# Patient Record
Sex: MALE | Race: BLACK OR AFRICAN AMERICAN | NOT HISPANIC OR LATINO | Employment: UNEMPLOYED | URBAN - METROPOLITAN AREA
[De-identification: names, ages, dates, MRNs, and addresses within clinical notes are randomized per-mention and may not be internally consistent; named-entity substitution may affect disease eponyms.]

---

## 2020-03-02 ENCOUNTER — HOSPITAL ENCOUNTER (OUTPATIENT)
Dept: URGENT CARE | Facility: CLINIC | Age: 50
Discharge: HOME | End: 2020-03-02
Attending: EMERGENCY MEDICINE

## 2020-03-19 ENCOUNTER — OFFICE VISIT CONVERTED (OUTPATIENT)
Dept: INTERNAL MEDICINE | Facility: CLINIC | Age: 50
End: 2020-03-19
Attending: PHYSICIAN ASSISTANT

## 2020-03-19 ENCOUNTER — HOSPITAL ENCOUNTER (OUTPATIENT)
Dept: OTHER | Facility: HOSPITAL | Age: 50
Discharge: HOME | End: 2020-03-19
Attending: PHYSICIAN ASSISTANT

## 2020-03-20 LAB — SARS-COV-2 RNA SPEC QL NAA+PROBE: NORMAL

## 2020-06-26 ENCOUNTER — HOSPITAL ENCOUNTER (INPATIENT)
Facility: HOSPITAL | Age: 50
LOS: 9 days | Discharge: HOME | End: 2020-07-05
Attending: EMERGENCY MEDICINE | Admitting: INTERNAL MEDICINE

## 2020-06-26 ENCOUNTER — APPOINTMENT (OUTPATIENT)
Dept: CT IMAGING | Facility: HOSPITAL | Age: 50
End: 2020-06-26

## 2020-06-26 ENCOUNTER — APPOINTMENT (OUTPATIENT)
Dept: GENERAL RADIOLOGY | Facility: HOSPITAL | Age: 50
End: 2020-06-26

## 2020-06-26 DIAGNOSIS — R91.8 LUNG NODULES: ICD-10-CM

## 2020-06-26 DIAGNOSIS — R06.00 DYSPNEA, UNSPECIFIED TYPE: Primary | ICD-10-CM

## 2020-06-26 DIAGNOSIS — E87.1 HYPONATREMIA: ICD-10-CM

## 2020-06-26 DIAGNOSIS — N17.9 AKI (ACUTE KIDNEY INJURY) (HCC): ICD-10-CM

## 2020-06-26 DIAGNOSIS — R09.02 HYPOXIA: ICD-10-CM

## 2020-06-26 DIAGNOSIS — Z20.822 SUSPECTED COVID-19 VIRUS INFECTION: ICD-10-CM

## 2020-06-26 DIAGNOSIS — R93.89 ABNORMAL CHEST CT: ICD-10-CM

## 2020-06-26 DIAGNOSIS — M48.062 SPINAL STENOSIS OF LUMBAR REGION WITH NEUROGENIC CLAUDICATION: ICD-10-CM

## 2020-06-26 DIAGNOSIS — E83.52 HYPERCALCEMIA: ICD-10-CM

## 2020-06-26 LAB
ALBUMIN SERPL-MCNC: 3.6 G/DL (ref 3.5–5.2)
ALBUMIN/GLOB SERPL: 0.8 G/DL
ALP SERPL-CCNC: 83 U/L (ref 39–117)
ALT SERPL W P-5'-P-CCNC: 12 U/L (ref 1–41)
ANION GAP SERPL CALCULATED.3IONS-SCNC: 12.9 MMOL/L (ref 5–15)
AST SERPL-CCNC: 21 U/L (ref 1–40)
BACTERIA UR QL AUTO: ABNORMAL /HPF
BASOPHILS # BLD AUTO: 0.02 10*3/MM3 (ref 0–0.2)
BASOPHILS NFR BLD AUTO: 0.2 % (ref 0–1.5)
BILIRUB SERPL-MCNC: 0.5 MG/DL (ref 0.2–1.2)
BILIRUB UR QL STRIP: NEGATIVE
BUN BLD-MCNC: 21 MG/DL (ref 6–20)
BUN/CREAT SERPL: 11.3 (ref 7–25)
CALCIUM SPEC-SCNC: 14 MG/DL (ref 8.6–10.5)
CHLORIDE SERPL-SCNC: 92 MMOL/L (ref 98–107)
CLARITY UR: CLEAR
CO2 SERPL-SCNC: 23.1 MMOL/L (ref 22–29)
COLOR UR: YELLOW
CREAT BLD-MCNC: 1.86 MG/DL (ref 0.76–1.27)
D-LACTATE SERPL-SCNC: 2.4 MMOL/L (ref 0.5–2)
DEPRECATED RDW RBC AUTO: 47.5 FL (ref 37–54)
EOSINOPHIL # BLD AUTO: 0.01 10*3/MM3 (ref 0–0.4)
EOSINOPHIL NFR BLD AUTO: 0.1 % (ref 0.3–6.2)
ERYTHROCYTE [DISTWIDTH] IN BLOOD BY AUTOMATED COUNT: 15.2 % (ref 12.3–15.4)
GFR SERPL CREATININE-BSD FRML MDRD: 39 ML/MIN/1.73
GFR SERPL CREATININE-BSD FRML MDRD: 47 ML/MIN/1.73
GLOBULIN UR ELPH-MCNC: 4.6 GM/DL
GLUCOSE BLD-MCNC: 78 MG/DL (ref 65–99)
GLUCOSE UR STRIP-MCNC: NEGATIVE MG/DL
HCT VFR BLD AUTO: 47.9 % (ref 37.5–51)
HGB BLD-MCNC: 16.4 G/DL (ref 13–17.7)
HGB UR QL STRIP.AUTO: ABNORMAL
HYALINE CASTS UR QL AUTO: ABNORMAL /LPF
IMM GRANULOCYTES # BLD AUTO: 0.05 10*3/MM3 (ref 0–0.05)
IMM GRANULOCYTES NFR BLD AUTO: 0.5 % (ref 0–0.5)
INR PPP: 1.28 (ref 0.9–1.1)
KETONES UR QL STRIP: ABNORMAL
LACTATE HOLD SPECIMEN: NORMAL
LEUKOCYTE ESTERASE UR QL STRIP.AUTO: NEGATIVE
LYMPHOCYTES # BLD AUTO: 1.07 10*3/MM3 (ref 0.7–3.1)
LYMPHOCYTES NFR BLD AUTO: 11.3 % (ref 19.6–45.3)
MAGNESIUM SERPL-MCNC: 1.6 MG/DL (ref 1.6–2.6)
MCH RBC QN AUTO: 29.3 PG (ref 26.6–33)
MCHC RBC AUTO-ENTMCNC: 34.2 G/DL (ref 31.5–35.7)
MCV RBC AUTO: 85.5 FL (ref 79–97)
MONOCYTES # BLD AUTO: 0.93 10*3/MM3 (ref 0.1–0.9)
MONOCYTES NFR BLD AUTO: 9.8 % (ref 5–12)
NEUTROPHILS # BLD AUTO: 7.43 10*3/MM3 (ref 1.7–7)
NEUTROPHILS NFR BLD AUTO: 78.1 % (ref 42.7–76)
NITRITE UR QL STRIP: NEGATIVE
NRBC BLD AUTO-RTO: 0 /100 WBC (ref 0–0.2)
NT-PROBNP SERPL-MCNC: 81.2 PG/ML (ref 5–450)
PH UR STRIP.AUTO: <=5 [PH] (ref 5–8)
PLATELET # BLD AUTO: 346 10*3/MM3 (ref 140–450)
PMV BLD AUTO: 9.8 FL (ref 6–12)
POTASSIUM BLD-SCNC: 3.6 MMOL/L (ref 3.5–5.2)
PROCALCITONIN SERPL-MCNC: 0.17 NG/ML (ref 0.1–0.25)
PROT SERPL-MCNC: 8.2 G/DL (ref 6–8.5)
PROT UR QL STRIP: ABNORMAL
PROTHROMBIN TIME: 15.7 SECONDS (ref 11.7–14.2)
RBC # BLD AUTO: 5.6 10*6/MM3 (ref 4.14–5.8)
RBC # UR: ABNORMAL /HPF
REF LAB TEST METHOD: ABNORMAL
SARS-COV-2 N GENE NPH QL NAA+PROBE: NOT DETECTED
SODIUM BLD-SCNC: 128 MMOL/L (ref 136–145)
SP GR UR STRIP: >=1.03 (ref 1–1.03)
SQUAMOUS #/AREA URNS HPF: ABNORMAL /HPF
TROPONIN T SERPL-MCNC: <0.01 NG/ML (ref 0–0.03)
UROBILINOGEN UR QL STRIP: ABNORMAL
WBC CASTS #/AREA URNS LPF: ABNORMAL /LPF
WBC NRBC COR # BLD: 9.51 10*3/MM3 (ref 3.4–10.8)
WBC UR QL AUTO: ABNORMAL /HPF

## 2020-06-26 PROCEDURE — 71275 CT ANGIOGRAPHY CHEST: CPT

## 2020-06-26 PROCEDURE — 83735 ASSAY OF MAGNESIUM: CPT | Performed by: NURSE PRACTITIONER

## 2020-06-26 PROCEDURE — 93005 ELECTROCARDIOGRAM TRACING: CPT | Performed by: NURSE PRACTITIONER

## 2020-06-26 PROCEDURE — 80053 COMPREHEN METABOLIC PANEL: CPT | Performed by: NURSE PRACTITIONER

## 2020-06-26 PROCEDURE — 0 IOPAMIDOL PER 1 ML: Performed by: EMERGENCY MEDICINE

## 2020-06-26 PROCEDURE — 87635 SARS-COV-2 COVID-19 AMP PRB: CPT | Performed by: NURSE PRACTITIONER

## 2020-06-26 PROCEDURE — 83605 ASSAY OF LACTIC ACID: CPT | Performed by: NURSE PRACTITIONER

## 2020-06-26 PROCEDURE — 87040 BLOOD CULTURE FOR BACTERIA: CPT | Performed by: NURSE PRACTITIONER

## 2020-06-26 PROCEDURE — 84145 PROCALCITONIN (PCT): CPT | Performed by: NURSE PRACTITIONER

## 2020-06-26 PROCEDURE — 99284 EMERGENCY DEPT VISIT MOD MDM: CPT

## 2020-06-26 PROCEDURE — 85610 PROTHROMBIN TIME: CPT | Performed by: NURSE PRACTITIONER

## 2020-06-26 PROCEDURE — 85025 COMPLETE CBC W/AUTO DIFF WBC: CPT | Performed by: NURSE PRACTITIONER

## 2020-06-26 PROCEDURE — 71045 X-RAY EXAM CHEST 1 VIEW: CPT

## 2020-06-26 PROCEDURE — 84484 ASSAY OF TROPONIN QUANT: CPT | Performed by: NURSE PRACTITIONER

## 2020-06-26 PROCEDURE — 83880 ASSAY OF NATRIURETIC PEPTIDE: CPT | Performed by: NURSE PRACTITIONER

## 2020-06-26 PROCEDURE — 25010000002 MORPHINE PER 10 MG: Performed by: NURSE PRACTITIONER

## 2020-06-26 PROCEDURE — 25010000002 METHYLPREDNISOLONE PER 125 MG: Performed by: INTERNAL MEDICINE

## 2020-06-26 PROCEDURE — 81001 URINALYSIS AUTO W/SCOPE: CPT | Performed by: NURSE PRACTITIONER

## 2020-06-26 PROCEDURE — 36415 COLL VENOUS BLD VENIPUNCTURE: CPT | Performed by: NURSE PRACTITIONER

## 2020-06-26 PROCEDURE — 25010000002 ONDANSETRON PER 1 MG: Performed by: NURSE PRACTITIONER

## 2020-06-26 PROCEDURE — 93010 ELECTROCARDIOGRAM REPORT: CPT | Performed by: INTERNAL MEDICINE

## 2020-06-26 PROCEDURE — 80048 BASIC METABOLIC PNL TOTAL CA: CPT | Performed by: INTERNAL MEDICINE

## 2020-06-26 RX ORDER — MORPHINE SULFATE 2 MG/ML
4 INJECTION, SOLUTION INTRAMUSCULAR; INTRAVENOUS ONCE
Status: COMPLETED | OUTPATIENT
Start: 2020-06-26 | End: 2020-06-26

## 2020-06-26 RX ORDER — SODIUM CHLORIDE 0.9 % (FLUSH) 0.9 %
10 SYRINGE (ML) INJECTION AS NEEDED
Status: DISCONTINUED | OUTPATIENT
Start: 2020-06-26 | End: 2020-07-05 | Stop reason: HOSPADM

## 2020-06-26 RX ORDER — ACETAMINOPHEN 325 MG/1
650 TABLET ORAL EVERY 4 HOURS PRN
Status: DISCONTINUED | OUTPATIENT
Start: 2020-06-26 | End: 2020-07-05 | Stop reason: HOSPADM

## 2020-06-26 RX ORDER — SODIUM CHLORIDE 9 MG/ML
75 INJECTION, SOLUTION INTRAVENOUS CONTINUOUS
Status: DISCONTINUED | OUTPATIENT
Start: 2020-06-26 | End: 2020-07-01

## 2020-06-26 RX ORDER — ONDANSETRON 4 MG/1
4 TABLET, FILM COATED ORAL EVERY 6 HOURS PRN
Status: DISCONTINUED | OUTPATIENT
Start: 2020-06-26 | End: 2020-07-05 | Stop reason: HOSPADM

## 2020-06-26 RX ORDER — HYDROCODONE BITARTRATE AND ACETAMINOPHEN 5; 325 MG/1; MG/1
1 TABLET ORAL EVERY 4 HOURS PRN
Status: DISCONTINUED | OUTPATIENT
Start: 2020-06-26 | End: 2020-07-02

## 2020-06-26 RX ORDER — BISACODYL 10 MG
10 SUPPOSITORY, RECTAL RECTAL DAILY PRN
Status: DISCONTINUED | OUTPATIENT
Start: 2020-06-26 | End: 2020-07-05 | Stop reason: HOSPADM

## 2020-06-26 RX ORDER — SODIUM CHLORIDE 0.9 % (FLUSH) 0.9 %
10 SYRINGE (ML) INJECTION EVERY 12 HOURS SCHEDULED
Status: DISCONTINUED | OUTPATIENT
Start: 2020-06-26 | End: 2020-07-05 | Stop reason: HOSPADM

## 2020-06-26 RX ORDER — NITROGLYCERIN 0.4 MG/1
0.4 TABLET SUBLINGUAL
Status: DISCONTINUED | OUTPATIENT
Start: 2020-06-26 | End: 2020-07-05 | Stop reason: HOSPADM

## 2020-06-26 RX ORDER — ONDANSETRON 2 MG/ML
4 INJECTION INTRAMUSCULAR; INTRAVENOUS EVERY 6 HOURS PRN
Status: DISCONTINUED | OUTPATIENT
Start: 2020-06-26 | End: 2020-07-05 | Stop reason: HOSPADM

## 2020-06-26 RX ORDER — BISACODYL 5 MG/1
5 TABLET, DELAYED RELEASE ORAL DAILY PRN
Status: DISCONTINUED | OUTPATIENT
Start: 2020-06-26 | End: 2020-07-05 | Stop reason: HOSPADM

## 2020-06-26 RX ORDER — IPRATROPIUM BROMIDE AND ALBUTEROL SULFATE 2.5; .5 MG/3ML; MG/3ML
3 SOLUTION RESPIRATORY (INHALATION)
Status: DISCONTINUED | OUTPATIENT
Start: 2020-06-26 | End: 2020-07-01

## 2020-06-26 RX ORDER — ONDANSETRON 2 MG/ML
4 INJECTION INTRAMUSCULAR; INTRAVENOUS ONCE
Status: COMPLETED | OUTPATIENT
Start: 2020-06-26 | End: 2020-06-26

## 2020-06-26 RX ORDER — METHYLPREDNISOLONE SODIUM SUCCINATE 125 MG/2ML
60 INJECTION, POWDER, LYOPHILIZED, FOR SOLUTION INTRAMUSCULAR; INTRAVENOUS EVERY 6 HOURS
Status: DISCONTINUED | OUTPATIENT
Start: 2020-06-26 | End: 2020-07-01

## 2020-06-26 RX ORDER — ZOLPIDEM TARTRATE 5 MG/1
5 TABLET ORAL NIGHTLY PRN
Status: DISCONTINUED | OUTPATIENT
Start: 2020-06-26 | End: 2020-07-03

## 2020-06-26 RX ORDER — HYDROCODONE BITARTRATE AND ACETAMINOPHEN 7.5; 325 MG/1; MG/1
2 TABLET ORAL EVERY 4 HOURS PRN
Status: DISCONTINUED | OUTPATIENT
Start: 2020-06-26 | End: 2020-07-02

## 2020-06-26 RX ADMIN — IOPAMIDOL 95 ML: 755 INJECTION, SOLUTION INTRAVENOUS at 18:33

## 2020-06-26 RX ADMIN — METHYLPREDNISOLONE SODIUM SUCCINATE 60 MG: 125 INJECTION, POWDER, FOR SOLUTION INTRAMUSCULAR; INTRAVENOUS at 21:27

## 2020-06-26 RX ADMIN — HYDROCODONE BITARTRATE AND ACETAMINOPHEN 2 TABLET: 7.5; 325 TABLET ORAL at 23:54

## 2020-06-26 RX ADMIN — SODIUM CHLORIDE, PRESERVATIVE FREE 10 ML: 5 INJECTION INTRAVENOUS at 23:55

## 2020-06-26 RX ADMIN — ONDANSETRON 4 MG: 2 INJECTION INTRAMUSCULAR; INTRAVENOUS at 19:17

## 2020-06-26 RX ADMIN — MORPHINE SULFATE 4 MG: 2 INJECTION, SOLUTION INTRAMUSCULAR; INTRAVENOUS at 19:17

## 2020-06-26 RX ADMIN — SODIUM CHLORIDE 125 ML/HR: 9 INJECTION, SOLUTION INTRAVENOUS at 21:27

## 2020-06-26 RX ADMIN — SODIUM CHLORIDE 1000 ML: 9 INJECTION, SOLUTION INTRAVENOUS at 18:20

## 2020-06-27 LAB
25(OH)D3 SERPL-MCNC: 29.8 NG/ML (ref 30–100)
ANION GAP SERPL CALCULATED.3IONS-SCNC: 8.3 MMOL/L (ref 5–15)
ANION GAP SERPL CALCULATED.3IONS-SCNC: 8.4 MMOL/L (ref 5–15)
BUN BLD-MCNC: 20 MG/DL (ref 6–20)
BUN BLD-MCNC: 20 MG/DL (ref 6–20)
BUN/CREAT SERPL: 12.1 (ref 7–25)
BUN/CREAT SERPL: 12.7 (ref 7–25)
CA-I BLD-MCNC: 7.7 MG/DL (ref 4.6–5.4)
CA-I SERPL ISE-MCNC: 1.93 MMOL/L (ref 1.15–1.35)
CALCIUM SPEC-SCNC: 12.5 MG/DL (ref 8.6–10.5)
CALCIUM SPEC-SCNC: 12.8 MG/DL (ref 8.6–10.5)
CHLORIDE SERPL-SCNC: 97 MMOL/L (ref 98–107)
CHLORIDE SERPL-SCNC: 97 MMOL/L (ref 98–107)
CHLORIDE UR-SCNC: 118 MMOL/L
CO2 SERPL-SCNC: 22.7 MMOL/L (ref 22–29)
CO2 SERPL-SCNC: 23.6 MMOL/L (ref 22–29)
CREAT BLD-MCNC: 1.57 MG/DL (ref 0.76–1.27)
CREAT BLD-MCNC: 1.65 MG/DL (ref 0.76–1.27)
D-LACTATE SERPL-SCNC: 1.5 MMOL/L (ref 0.5–2)
GFR SERPL CREATININE-BSD FRML MDRD: 54 ML/MIN/1.73
GFR SERPL CREATININE-BSD FRML MDRD: 57 ML/MIN/1.73
GLUCOSE BLD-MCNC: 138 MG/DL (ref 65–99)
GLUCOSE BLD-MCNC: 99 MG/DL (ref 65–99)
OSMOLALITY UR: 328 MOSM/KG
POTASSIUM BLD-SCNC: 3.8 MMOL/L (ref 3.5–5.2)
POTASSIUM BLD-SCNC: 4.2 MMOL/L (ref 3.5–5.2)
PTH-INTACT SERPL-MCNC: 11.8 PG/ML (ref 15–65)
SODIUM BLD-SCNC: 128 MMOL/L (ref 136–145)
SODIUM BLD-SCNC: 129 MMOL/L (ref 136–145)
SODIUM UR-SCNC: 111 MMOL/L

## 2020-06-27 PROCEDURE — 80048 BASIC METABOLIC PNL TOTAL CA: CPT | Performed by: INTERNAL MEDICINE

## 2020-06-27 PROCEDURE — 83935 ASSAY OF URINE OSMOLALITY: CPT | Performed by: INTERNAL MEDICINE

## 2020-06-27 PROCEDURE — 82397 CHEMILUMINESCENT ASSAY: CPT | Performed by: INTERNAL MEDICINE

## 2020-06-27 PROCEDURE — 82306 VITAMIN D 25 HYDROXY: CPT | Performed by: INTERNAL MEDICINE

## 2020-06-27 PROCEDURE — 63710000001 CALCITONIN PER 400 UNITS: Performed by: INTERNAL MEDICINE

## 2020-06-27 PROCEDURE — 94640 AIRWAY INHALATION TREATMENT: CPT

## 2020-06-27 PROCEDURE — 83970 ASSAY OF PARATHORMONE: CPT | Performed by: INTERNAL MEDICINE

## 2020-06-27 PROCEDURE — 82330 ASSAY OF CALCIUM: CPT | Performed by: INTERNAL MEDICINE

## 2020-06-27 PROCEDURE — 84300 ASSAY OF URINE SODIUM: CPT | Performed by: INTERNAL MEDICINE

## 2020-06-27 PROCEDURE — 25010000002 METHYLPREDNISOLONE PER 125 MG: Performed by: INTERNAL MEDICINE

## 2020-06-27 PROCEDURE — 82436 ASSAY OF URINE CHLORIDE: CPT | Performed by: INTERNAL MEDICINE

## 2020-06-27 PROCEDURE — 94799 UNLISTED PULMONARY SVC/PX: CPT

## 2020-06-27 RX ORDER — BUMETANIDE 0.25 MG/ML
2 INJECTION INTRAMUSCULAR; INTRAVENOUS EVERY 12 HOURS
Status: DISCONTINUED | OUTPATIENT
Start: 2020-06-27 | End: 2020-06-30

## 2020-06-27 RX ORDER — CALCITONIN SALMON 200 [USP'U]/ML
4 INJECTION, SOLUTION INTRAMUSCULAR; SUBCUTANEOUS EVERY 12 HOURS
Status: COMPLETED | OUTPATIENT
Start: 2020-06-27 | End: 2020-06-29

## 2020-06-27 RX ADMIN — SODIUM CHLORIDE, PRESERVATIVE FREE 10 ML: 5 INJECTION INTRAVENOUS at 08:17

## 2020-06-27 RX ADMIN — METHYLPREDNISOLONE SODIUM SUCCINATE 60 MG: 125 INJECTION, POWDER, FOR SOLUTION INTRAMUSCULAR; INTRAVENOUS at 20:01

## 2020-06-27 RX ADMIN — BUMETANIDE 2 MG: 0.25 INJECTION INTRAMUSCULAR; INTRAVENOUS at 16:19

## 2020-06-27 RX ADMIN — METHYLPREDNISOLONE SODIUM SUCCINATE 60 MG: 125 INJECTION, POWDER, FOR SOLUTION INTRAMUSCULAR; INTRAVENOUS at 01:57

## 2020-06-27 RX ADMIN — SODIUM CHLORIDE 125 ML/HR: 9 INJECTION, SOLUTION INTRAVENOUS at 05:43

## 2020-06-27 RX ADMIN — METHYLPREDNISOLONE SODIUM SUCCINATE 60 MG: 125 INJECTION, POWDER, FOR SOLUTION INTRAMUSCULAR; INTRAVENOUS at 08:17

## 2020-06-27 RX ADMIN — HYDROCODONE BITARTRATE AND ACETAMINOPHEN 2 TABLET: 7.5; 325 TABLET ORAL at 12:33

## 2020-06-27 RX ADMIN — CALCITONIN SALMON 330 UNITS: 200 INJECTION, SOLUTION INTRAMUSCULAR; SUBCUTANEOUS at 19:07

## 2020-06-27 RX ADMIN — IPRATROPIUM BROMIDE AND ALBUTEROL SULFATE 3 ML: .5; 3 SOLUTION RESPIRATORY (INHALATION) at 19:04

## 2020-06-27 RX ADMIN — METHYLPREDNISOLONE SODIUM SUCCINATE 60 MG: 125 INJECTION, POWDER, FOR SOLUTION INTRAMUSCULAR; INTRAVENOUS at 14:13

## 2020-06-27 RX ADMIN — SODIUM CHLORIDE 175 ML/HR: 9 INJECTION, SOLUTION INTRAVENOUS at 17:54

## 2020-06-27 RX ADMIN — SODIUM CHLORIDE, PRESERVATIVE FREE 10 ML: 5 INJECTION INTRAVENOUS at 20:02

## 2020-06-28 LAB
ANION GAP SERPL CALCULATED.3IONS-SCNC: 12.6 MMOL/L (ref 5–15)
BUN BLD-MCNC: 22 MG/DL (ref 6–20)
BUN/CREAT SERPL: 15.6 (ref 7–25)
CA-I BLD-MCNC: 7.2 MG/DL (ref 4.6–5.4)
CA-I SERPL ISE-MCNC: 1.79 MMOL/L (ref 1.15–1.35)
CALCIUM SPEC-SCNC: 12.3 MG/DL (ref 8.6–10.5)
CHLORIDE SERPL-SCNC: 100 MMOL/L (ref 98–107)
CO2 SERPL-SCNC: 25.4 MMOL/L (ref 22–29)
CREAT BLD-MCNC: 1.41 MG/DL (ref 0.76–1.27)
GFR SERPL CREATININE-BSD FRML MDRD: 65 ML/MIN/1.73
GLUCOSE BLD-MCNC: 155 MG/DL (ref 65–99)
POTASSIUM BLD-SCNC: 3.7 MMOL/L (ref 3.5–5.2)
SODIUM BLD-SCNC: 138 MMOL/L (ref 136–145)

## 2020-06-28 PROCEDURE — 80048 BASIC METABOLIC PNL TOTAL CA: CPT | Performed by: INTERNAL MEDICINE

## 2020-06-28 PROCEDURE — 36415 COLL VENOUS BLD VENIPUNCTURE: CPT | Performed by: INTERNAL MEDICINE

## 2020-06-28 PROCEDURE — 94799 UNLISTED PULMONARY SVC/PX: CPT

## 2020-06-28 PROCEDURE — 25010000002 METHYLPREDNISOLONE PER 125 MG: Performed by: INTERNAL MEDICINE

## 2020-06-28 PROCEDURE — 63710000001 CALCITONIN PER 400 UNITS: Performed by: INTERNAL MEDICINE

## 2020-06-28 PROCEDURE — 82330 ASSAY OF CALCIUM: CPT | Performed by: INTERNAL MEDICINE

## 2020-06-28 RX ADMIN — SODIUM CHLORIDE 175 ML/HR: 9 INJECTION, SOLUTION INTRAVENOUS at 00:18

## 2020-06-28 RX ADMIN — SODIUM CHLORIDE 175 ML/HR: 9 INJECTION, SOLUTION INTRAVENOUS at 08:49

## 2020-06-28 RX ADMIN — IPRATROPIUM BROMIDE AND ALBUTEROL SULFATE 3 ML: .5; 3 SOLUTION RESPIRATORY (INHALATION) at 07:39

## 2020-06-28 RX ADMIN — IPRATROPIUM BROMIDE AND ALBUTEROL SULFATE 3 ML: .5; 3 SOLUTION RESPIRATORY (INHALATION) at 19:39

## 2020-06-28 RX ADMIN — METHYLPREDNISOLONE SODIUM SUCCINATE 60 MG: 125 INJECTION, POWDER, FOR SOLUTION INTRAMUSCULAR; INTRAVENOUS at 19:48

## 2020-06-28 RX ADMIN — METHYLPREDNISOLONE SODIUM SUCCINATE 60 MG: 125 INJECTION, POWDER, FOR SOLUTION INTRAMUSCULAR; INTRAVENOUS at 02:54

## 2020-06-28 RX ADMIN — BUMETANIDE 2 MG: 0.25 INJECTION INTRAMUSCULAR; INTRAVENOUS at 14:10

## 2020-06-28 RX ADMIN — BUMETANIDE 2 MG: 0.25 INJECTION INTRAMUSCULAR; INTRAVENOUS at 02:51

## 2020-06-28 RX ADMIN — METHYLPREDNISOLONE SODIUM SUCCINATE 60 MG: 125 INJECTION, POWDER, FOR SOLUTION INTRAMUSCULAR; INTRAVENOUS at 08:48

## 2020-06-28 RX ADMIN — HYDROCODONE BITARTRATE AND ACETAMINOPHEN 2 TABLET: 7.5; 325 TABLET ORAL at 12:13

## 2020-06-28 RX ADMIN — SODIUM CHLORIDE, PRESERVATIVE FREE 10 ML: 5 INJECTION INTRAVENOUS at 08:49

## 2020-06-28 RX ADMIN — CALCITONIN SALMON 330 UNITS: 200 INJECTION, SOLUTION INTRAMUSCULAR; SUBCUTANEOUS at 18:42

## 2020-06-28 RX ADMIN — HYDROCODONE BITARTRATE AND ACETAMINOPHEN 2 TABLET: 7.5; 325 TABLET ORAL at 03:04

## 2020-06-28 RX ADMIN — CALCITONIN SALMON 330 UNITS: 200 INJECTION, SOLUTION INTRAMUSCULAR; SUBCUTANEOUS at 06:52

## 2020-06-28 RX ADMIN — HYDROCODONE BITARTRATE AND ACETAMINOPHEN 2 TABLET: 7.5; 325 TABLET ORAL at 23:27

## 2020-06-28 RX ADMIN — SODIUM CHLORIDE, PRESERVATIVE FREE 10 ML: 5 INJECTION INTRAVENOUS at 19:49

## 2020-06-28 RX ADMIN — METHYLPREDNISOLONE SODIUM SUCCINATE 60 MG: 125 INJECTION, POWDER, FOR SOLUTION INTRAMUSCULAR; INTRAVENOUS at 14:10

## 2020-06-28 RX ADMIN — SODIUM CHLORIDE 175 ML/HR: 9 INJECTION, SOLUTION INTRAVENOUS at 18:41

## 2020-06-29 PROBLEM — R91.8 LUNG NODULES: Status: ACTIVE | Noted: 2020-06-26

## 2020-06-29 LAB
ANION GAP SERPL CALCULATED.3IONS-SCNC: 7.4 MMOL/L (ref 5–15)
BUN BLD-MCNC: 20 MG/DL (ref 6–20)
BUN/CREAT SERPL: 19 (ref 7–25)
CA-I BLD-MCNC: 6.5 MG/DL (ref 4.6–5.4)
CA-I SERPL ISE-MCNC: 1.63 MMOL/L (ref 1.15–1.35)
CALCIUM SPEC-SCNC: 11.4 MG/DL (ref 8.6–10.5)
CHLORIDE SERPL-SCNC: 96 MMOL/L (ref 98–107)
CO2 SERPL-SCNC: 28.6 MMOL/L (ref 22–29)
CREAT BLD-MCNC: 1.05 MG/DL (ref 0.76–1.27)
GFR SERPL CREATININE-BSD FRML MDRD: 91 ML/MIN/1.73
GLUCOSE BLD-MCNC: 130 MG/DL (ref 65–99)
POTASSIUM BLD-SCNC: 3.7 MMOL/L (ref 3.5–5.2)
SODIUM BLD-SCNC: 132 MMOL/L (ref 136–145)

## 2020-06-29 PROCEDURE — 63710000001 CALCITONIN PER 400 UNITS: Performed by: INTERNAL MEDICINE

## 2020-06-29 PROCEDURE — 94799 UNLISTED PULMONARY SVC/PX: CPT

## 2020-06-29 PROCEDURE — 36415 COLL VENOUS BLD VENIPUNCTURE: CPT | Performed by: INTERNAL MEDICINE

## 2020-06-29 PROCEDURE — 25010000002 METHYLPREDNISOLONE PER 125 MG: Performed by: INTERNAL MEDICINE

## 2020-06-29 PROCEDURE — 82330 ASSAY OF CALCIUM: CPT | Performed by: INTERNAL MEDICINE

## 2020-06-29 PROCEDURE — 80048 BASIC METABOLIC PNL TOTAL CA: CPT | Performed by: INTERNAL MEDICINE

## 2020-06-29 RX ADMIN — SODIUM CHLORIDE, PRESERVATIVE FREE 10 ML: 5 INJECTION INTRAVENOUS at 20:15

## 2020-06-29 RX ADMIN — METHYLPREDNISOLONE SODIUM SUCCINATE 60 MG: 125 INJECTION, POWDER, FOR SOLUTION INTRAMUSCULAR; INTRAVENOUS at 01:53

## 2020-06-29 RX ADMIN — BUMETANIDE 2 MG: 0.25 INJECTION INTRAMUSCULAR; INTRAVENOUS at 19:19

## 2020-06-29 RX ADMIN — SODIUM CHLORIDE, PRESERVATIVE FREE 10 ML: 5 INJECTION INTRAVENOUS at 10:49

## 2020-06-29 RX ADMIN — METHYLPREDNISOLONE SODIUM SUCCINATE 60 MG: 125 INJECTION, POWDER, FOR SOLUTION INTRAMUSCULAR; INTRAVENOUS at 19:59

## 2020-06-29 RX ADMIN — METHYLPREDNISOLONE SODIUM SUCCINATE 60 MG: 125 INJECTION, POWDER, FOR SOLUTION INTRAMUSCULAR; INTRAVENOUS at 10:47

## 2020-06-29 RX ADMIN — METHYLPREDNISOLONE SODIUM SUCCINATE 60 MG: 125 INJECTION, POWDER, FOR SOLUTION INTRAMUSCULAR; INTRAVENOUS at 16:44

## 2020-06-29 RX ADMIN — IPRATROPIUM BROMIDE AND ALBUTEROL SULFATE 3 ML: .5; 3 SOLUTION RESPIRATORY (INHALATION) at 19:49

## 2020-06-29 RX ADMIN — HYDROCODONE BITARTRATE AND ACETAMINOPHEN 2 TABLET: 7.5; 325 TABLET ORAL at 20:28

## 2020-06-29 RX ADMIN — SODIUM CHLORIDE 175 ML/HR: 9 INJECTION, SOLUTION INTRAVENOUS at 04:49

## 2020-06-29 RX ADMIN — SODIUM CHLORIDE 175 ML/HR: 9 INJECTION, SOLUTION INTRAVENOUS at 16:57

## 2020-06-29 RX ADMIN — BUMETANIDE 2 MG: 0.25 INJECTION INTRAMUSCULAR; INTRAVENOUS at 07:03

## 2020-06-29 RX ADMIN — CALCITONIN SALMON 330 UNITS: 200 INJECTION, SOLUTION INTRAMUSCULAR; SUBCUTANEOUS at 10:49

## 2020-06-29 RX ADMIN — HYDROCODONE BITARTRATE AND ACETAMINOPHEN 2 TABLET: 7.5; 325 TABLET ORAL at 16:52

## 2020-06-30 ENCOUNTER — APPOINTMENT (OUTPATIENT)
Dept: GENERAL RADIOLOGY | Facility: HOSPITAL | Age: 50
End: 2020-06-30

## 2020-06-30 ENCOUNTER — ANESTHESIA EVENT (OUTPATIENT)
Dept: GASTROENTEROLOGY | Facility: HOSPITAL | Age: 50
End: 2020-06-30

## 2020-06-30 ENCOUNTER — ANESTHESIA (OUTPATIENT)
Dept: GASTROENTEROLOGY | Facility: HOSPITAL | Age: 50
End: 2020-06-30

## 2020-06-30 LAB
ALBUMIN SERPL-MCNC: 3 G/DL (ref 3.5–5.2)
ANION GAP SERPL CALCULATED.3IONS-SCNC: 9.4 MMOL/L (ref 5–15)
BASOPHILS # BLD AUTO: 0.01 10*3/MM3 (ref 0–0.2)
BASOPHILS NFR BLD AUTO: 0.1 % (ref 0–1.5)
BUN SERPL-MCNC: 25 MG/DL (ref 6–20)
BUN/CREAT SERPL: 21.4 (ref 7–25)
CA-I BLD-MCNC: 6.4 MG/DL (ref 4.6–5.4)
CA-I SERPL ISE-MCNC: 1.61 MMOL/L (ref 1.15–1.35)
CALCIUM SPEC-SCNC: 10.8 MG/DL (ref 8.6–10.5)
CHLORIDE SERPL-SCNC: 99 MMOL/L (ref 98–107)
CO2 SERPL-SCNC: 29.6 MMOL/L (ref 22–29)
CREAT SERPL-MCNC: 1.17 MG/DL (ref 0.76–1.27)
DEPRECATED RDW RBC AUTO: 43.5 FL (ref 37–54)
EOSINOPHIL # BLD AUTO: 0 10*3/MM3 (ref 0–0.4)
EOSINOPHIL NFR BLD AUTO: 0 % (ref 0.3–6.2)
ERYTHROCYTE [DISTWIDTH] IN BLOOD BY AUTOMATED COUNT: 14.4 % (ref 12.3–15.4)
GFR SERPL CREATININE-BSD FRML MDRD: 80 ML/MIN/1.73
GIE STN SPEC: NORMAL
GLUCOSE SERPL-MCNC: 136 MG/DL (ref 65–99)
HCT VFR BLD AUTO: 34.7 % (ref 37.5–51)
HGB BLD-MCNC: 12.1 G/DL (ref 13–17.7)
IMM GRANULOCYTES # BLD AUTO: 0.12 10*3/MM3 (ref 0–0.05)
IMM GRANULOCYTES NFR BLD AUTO: 1.1 % (ref 0–0.5)
LYMPHOCYTES # BLD AUTO: 0.7 10*3/MM3 (ref 0.7–3.1)
LYMPHOCYTES NFR BLD AUTO: 6.5 % (ref 19.6–45.3)
MAGNESIUM SERPL-MCNC: 1.2 MG/DL (ref 1.6–2.6)
MCH RBC QN AUTO: 29.1 PG (ref 26.6–33)
MCHC RBC AUTO-ENTMCNC: 34.9 G/DL (ref 31.5–35.7)
MCV RBC AUTO: 83.4 FL (ref 79–97)
MONOCYTES # BLD AUTO: 0.46 10*3/MM3 (ref 0.1–0.9)
MONOCYTES NFR BLD AUTO: 4.2 % (ref 5–12)
NEUTROPHILS NFR BLD AUTO: 88.1 % (ref 42.7–76)
NEUTROPHILS NFR BLD AUTO: 9.55 10*3/MM3 (ref 1.7–7)
NRBC BLD AUTO-RTO: 0 /100 WBC (ref 0–0.2)
PHOSPHATE SERPL-MCNC: 3.3 MG/DL (ref 2.5–4.5)
PLATELET # BLD AUTO: 309 10*3/MM3 (ref 140–450)
PMV BLD AUTO: 9.5 FL (ref 6–12)
POTASSIUM SERPL-SCNC: 3.7 MMOL/L (ref 3.5–5.2)
RBC # BLD AUTO: 4.16 10*6/MM3 (ref 4.14–5.8)
SODIUM SERPL-SCNC: 138 MMOL/L (ref 136–145)
WBC # BLD AUTO: 10.84 10*3/MM3 (ref 3.4–10.8)

## 2020-06-30 PROCEDURE — 87071 CULTURE AEROBIC QUANT OTHER: CPT | Performed by: INTERNAL MEDICINE

## 2020-06-30 PROCEDURE — 71046 X-RAY EXAM CHEST 2 VIEWS: CPT

## 2020-06-30 PROCEDURE — 87102 FUNGUS ISOLATION CULTURE: CPT | Performed by: INTERNAL MEDICINE

## 2020-06-30 PROCEDURE — 94799 UNLISTED PULMONARY SVC/PX: CPT

## 2020-06-30 PROCEDURE — 87206 SMEAR FLUORESCENT/ACID STAI: CPT | Performed by: INTERNAL MEDICINE

## 2020-06-30 PROCEDURE — 82330 ASSAY OF CALCIUM: CPT | Performed by: INTERNAL MEDICINE

## 2020-06-30 PROCEDURE — 87252 VIRUS INOCULATION TISSUE: CPT | Performed by: INTERNAL MEDICINE

## 2020-06-30 PROCEDURE — 25010000002 METHYLPREDNISOLONE PER 125 MG: Performed by: INTERNAL MEDICINE

## 2020-06-30 PROCEDURE — 83735 ASSAY OF MAGNESIUM: CPT | Performed by: INTERNAL MEDICINE

## 2020-06-30 PROCEDURE — 0B9F7ZX DRAINAGE OF RIGHT LOWER LUNG LOBE, VIA NATURAL OR ARTIFICIAL OPENING, DIAGNOSTIC: ICD-10-PCS | Performed by: INTERNAL MEDICINE

## 2020-06-30 PROCEDURE — 25010000002 PROPOFOL 10 MG/ML EMULSION: Performed by: NURSE ANESTHETIST, CERTIFIED REGISTERED

## 2020-06-30 PROCEDURE — 76000 FLUOROSCOPY <1 HR PHYS/QHP: CPT

## 2020-06-30 PROCEDURE — 71045 X-RAY EXAM CHEST 1 VIEW: CPT

## 2020-06-30 PROCEDURE — 07B74ZX EXCISION OF THORAX LYMPHATIC, PERCUTANEOUS ENDOSCOPIC APPROACH, DIAGNOSTIC: ICD-10-PCS | Performed by: INTERNAL MEDICINE

## 2020-06-30 PROCEDURE — 88312 SPECIAL STAINS GROUP 1: CPT | Performed by: INTERNAL MEDICINE

## 2020-06-30 PROCEDURE — 0BBF8ZX EXCISION OF RIGHT LOWER LUNG LOBE, VIA NATURAL OR ARTIFICIAL OPENING ENDOSCOPIC, DIAGNOSTIC: ICD-10-PCS | Performed by: INTERNAL MEDICINE

## 2020-06-30 PROCEDURE — 87205 SMEAR GRAM STAIN: CPT | Performed by: INTERNAL MEDICINE

## 2020-06-30 PROCEDURE — C1726 CATH, BAL DIL, NON-VASCULAR: HCPCS | Performed by: INTERNAL MEDICINE

## 2020-06-30 PROCEDURE — 87116 MYCOBACTERIA CULTURE: CPT | Performed by: INTERNAL MEDICINE

## 2020-06-30 PROCEDURE — 85025 COMPLETE CBC W/AUTO DIFF WBC: CPT | Performed by: INTERNAL MEDICINE

## 2020-06-30 PROCEDURE — 72072 X-RAY EXAM THORAC SPINE 3VWS: CPT

## 2020-06-30 PROCEDURE — 88173 CYTOPATH EVAL FNA REPORT: CPT | Performed by: INTERNAL MEDICINE

## 2020-06-30 PROCEDURE — 80069 RENAL FUNCTION PANEL: CPT | Performed by: INTERNAL MEDICINE

## 2020-06-30 PROCEDURE — 88112 CYTOPATH CELL ENHANCE TECH: CPT | Performed by: INTERNAL MEDICINE

## 2020-06-30 PROCEDURE — 88305 TISSUE EXAM BY PATHOLOGIST: CPT | Performed by: INTERNAL MEDICINE

## 2020-06-30 PROCEDURE — 72110 X-RAY EXAM L-2 SPINE 4/>VWS: CPT

## 2020-06-30 RX ORDER — PROPOFOL 10 MG/ML
VIAL (ML) INTRAVENOUS AS NEEDED
Status: DISCONTINUED | OUTPATIENT
Start: 2020-06-30 | End: 2020-06-30 | Stop reason: SURG

## 2020-06-30 RX ORDER — LIDOCAINE HYDROCHLORIDE 10 MG/ML
INJECTION, SOLUTION EPIDURAL; INFILTRATION; INTRACAUDAL; PERINEURAL AS NEEDED
Status: DISCONTINUED | OUTPATIENT
Start: 2020-06-30 | End: 2020-06-30 | Stop reason: HOSPADM

## 2020-06-30 RX ORDER — LIDOCAINE HYDROCHLORIDE 20 MG/ML
INJECTION, SOLUTION INFILTRATION; PERINEURAL AS NEEDED
Status: DISCONTINUED | OUTPATIENT
Start: 2020-06-30 | End: 2020-06-30 | Stop reason: SURG

## 2020-06-30 RX ORDER — LIDOCAINE HYDROCHLORIDE 20 MG/ML
INJECTION, SOLUTION EPIDURAL; INFILTRATION; INTRACAUDAL; PERINEURAL AS NEEDED
Status: DISCONTINUED | OUTPATIENT
Start: 2020-06-30 | End: 2020-06-30 | Stop reason: HOSPADM

## 2020-06-30 RX ORDER — SODIUM CHLORIDE 9 MG/ML
30 INJECTION, SOLUTION INTRAVENOUS CONTINUOUS PRN
Status: DISCONTINUED | OUTPATIENT
Start: 2020-06-30 | End: 2020-07-05 | Stop reason: HOSPADM

## 2020-06-30 RX ADMIN — PROPOFOL 300 MCG/KG/MIN: 10 INJECTION, EMULSION INTRAVENOUS at 13:25

## 2020-06-30 RX ADMIN — SODIUM CHLORIDE 175 ML/HR: 9 INJECTION, SOLUTION INTRAVENOUS at 06:22

## 2020-06-30 RX ADMIN — PROPOFOL 200 MG: 10 INJECTION, EMULSION INTRAVENOUS at 13:25

## 2020-06-30 RX ADMIN — METHYLPREDNISOLONE SODIUM SUCCINATE 60 MG: 125 INJECTION, POWDER, FOR SOLUTION INTRAMUSCULAR; INTRAVENOUS at 22:55

## 2020-06-30 RX ADMIN — LIDOCAINE HYDROCHLORIDE 60 MG: 20 INJECTION, SOLUTION INFILTRATION; PERINEURAL at 13:25

## 2020-06-30 RX ADMIN — SODIUM CHLORIDE 30 ML/HR: 9 INJECTION, SOLUTION INTRAVENOUS at 12:44

## 2020-06-30 RX ADMIN — METHYLPREDNISOLONE SODIUM SUCCINATE 60 MG: 125 INJECTION, POWDER, FOR SOLUTION INTRAMUSCULAR; INTRAVENOUS at 08:21

## 2020-06-30 RX ADMIN — SODIUM CHLORIDE, PRESERVATIVE FREE 10 ML: 5 INJECTION INTRAVENOUS at 22:56

## 2020-06-30 RX ADMIN — METHYLPREDNISOLONE SODIUM SUCCINATE 60 MG: 125 INJECTION, POWDER, FOR SOLUTION INTRAMUSCULAR; INTRAVENOUS at 16:34

## 2020-06-30 RX ADMIN — PROPOFOL 50 MG: 10 INJECTION, EMULSION INTRAVENOUS at 13:26

## 2020-06-30 RX ADMIN — SODIUM CHLORIDE 175 ML/HR: 9 INJECTION, SOLUTION INTRAVENOUS at 00:41

## 2020-06-30 RX ADMIN — HYDROCODONE BITARTRATE AND ACETAMINOPHEN 1 TABLET: 5; 325 TABLET ORAL at 20:24

## 2020-06-30 RX ADMIN — BUMETANIDE 2 MG: 0.25 INJECTION INTRAMUSCULAR; INTRAVENOUS at 06:22

## 2020-06-30 RX ADMIN — SODIUM CHLORIDE, PRESERVATIVE FREE 10 ML: 5 INJECTION INTRAVENOUS at 08:21

## 2020-06-30 RX ADMIN — SODIUM CHLORIDE 75 ML/HR: 9 INJECTION, SOLUTION INTRAVENOUS at 16:38

## 2020-06-30 RX ADMIN — IPRATROPIUM BROMIDE AND ALBUTEROL SULFATE 3 ML: .5; 3 SOLUTION RESPIRATORY (INHALATION) at 07:56

## 2020-06-30 RX ADMIN — SODIUM CHLORIDE, PRESERVATIVE FREE 10 ML: 5 INJECTION INTRAVENOUS at 16:34

## 2020-06-30 RX ADMIN — METHYLPREDNISOLONE SODIUM SUCCINATE 60 MG: 125 INJECTION, POWDER, FOR SOLUTION INTRAMUSCULAR; INTRAVENOUS at 00:40

## 2020-06-30 RX ADMIN — IPRATROPIUM BROMIDE AND ALBUTEROL SULFATE 3 ML: .5; 3 SOLUTION RESPIRATORY (INHALATION) at 19:43

## 2020-06-30 NOTE — ANESTHESIA POSTPROCEDURE EVALUATION
Patient: Bonifacio Lewis    Procedure Summary     Date:  06/30/20 Room / Location:  [facility] ENDOSCOPY 7 /  [facility]    Anesthesia Start:  1317 Anesthesia Stop:  1446    Procedure:  BRONCHOSCOPY UNDER FLUORO WITH BAL & BIOPSIES; ENDOBRONCHIAL ULTRASOUND WITH FNA'S (Bilateral Bronchus) Diagnosis:       Lung nodules      (Lung nodules [R91.8])    Surgeon:  Олег Paniagua MD Provider:  Linda Fuchs MD    Anesthesia Type:  general ASA Status:  2          Anesthesia Type: general    Vitals  Vitals Value Taken Time   /98 6/30/2020  3:03 PM   Temp     Pulse 115 6/30/2020  3:03 PM   Resp 20 6/30/2020  3:03 PM   SpO2 97 % 6/30/2020  3:15 PM           Post Anesthesia Care and Evaluation    Patient location during evaluation: PACU  Patient participation: complete - patient participated  Level of consciousness: awake and alert  Pain management: adequate  Airway patency: patent  Anesthetic complications: No anesthetic complications  PONV Status: none  Cardiovascular status: acceptable  Respiratory status: acceptable  Hydration status: acceptable

## 2020-06-30 NOTE — ANESTHESIA PROCEDURE NOTES
Airway  Urgency: elective    Date/Time: 6/30/2020 1:27 PM  Airway not difficult    General Information and Staff    Patient location during procedure: OR  Anesthesiologist: Linda Fuchs MD  CRNA: Patrica Leong CRNA    Indications and Patient Condition  Indications for airway management: airway protection    Preoxygenated: yes  MILS maintained throughout  Mask difficulty assessment: 0 - not attempted    Final Airway Details  Final airway type: supraglottic airway      Successful airway: bronch  Size 4    Number of attempts at approach: 1  Assessment: lips, teeth, and gum same as pre-op and atraumatic intubation

## 2020-06-30 NOTE — ANESTHESIA PREPROCEDURE EVALUATION
Anesthesia Evaluation     Patient summary reviewed and Nursing notes reviewed   no history of anesthetic complications:  NPO Solid Status: > 8 hours  NPO Liquid Status: > 4 hours           Airway   Mallampati: II  TM distance: >3 FB  Neck ROM: full  No difficulty expected  Dental      Comment: Multiple chipped upper and lower teeth    Pulmonary - normal exam    breath sounds clear to auscultation  (+) shortness of breath,     ROS comment: Lung nodules  Cardiovascular - negative cardio ROS and normal exam    Rhythm: regular  Rate: normal        Neuro/Psych  GI/Hepatic/Renal/Endo - negative ROS     Musculoskeletal (-) negative ROS    Abdominal  - normal exam   Substance History - negative use     OB/GYN negative ob/gyn ROS         Other - negative ROS                       Anesthesia Plan    ASA 2     general     intravenous induction     Anesthetic plan, all risks, benefits, and alternatives have been provided, discussed and informed consent has been obtained with: patient.

## 2020-07-01 ENCOUNTER — APPOINTMENT (OUTPATIENT)
Dept: CARDIOLOGY | Facility: HOSPITAL | Age: 50
End: 2020-07-01

## 2020-07-01 LAB
ALBUMIN SERPL-MCNC: 3 G/DL (ref 3.5–5.2)
ANION GAP SERPL CALCULATED.3IONS-SCNC: 8 MMOL/L (ref 5–15)
BACTERIA SPEC AEROBE CULT: NORMAL
BACTERIA SPEC AEROBE CULT: NORMAL
BUN SERPL-MCNC: 22 MG/DL (ref 6–20)
BUN/CREAT SERPL: 20.4 (ref 7–25)
CA-I BLD-MCNC: 6.4 MG/DL (ref 4.6–5.4)
CA-I SERPL ISE-MCNC: 1.6 MMOL/L (ref 1.15–1.35)
CALCIUM SPEC-SCNC: 10.5 MG/DL (ref 8.6–10.5)
CHLORIDE SERPL-SCNC: 99 MMOL/L (ref 98–107)
CO2 SERPL-SCNC: 29 MMOL/L (ref 22–29)
CREAT SERPL-MCNC: 1.08 MG/DL (ref 0.76–1.27)
CYTO UR: NORMAL
CYTO UR: NORMAL
GFR SERPL CREATININE-BSD FRML MDRD: 88 ML/MIN/1.73
GLUCOSE SERPL-MCNC: 132 MG/DL (ref 65–99)
LAB AP CASE REPORT: NORMAL
LAB AP CASE REPORT: NORMAL
LAB AP DIAGNOSIS COMMENT: NORMAL
LAB AP NON-GYN SPECIMEN ADEQUACY: NORMAL
MAGNESIUM SERPL-MCNC: 1.3 MG/DL (ref 1.6–2.6)
PATH REPORT.FINAL DX SPEC: NORMAL
PATH REPORT.FINAL DX SPEC: NORMAL
PATH REPORT.GROSS SPEC: NORMAL
PATH REPORT.GROSS SPEC: NORMAL
PHOSPHATE SERPL-MCNC: 2.9 MG/DL (ref 2.5–4.5)
POTASSIUM SERPL-SCNC: 3.5 MMOL/L (ref 3.5–5.2)
POTASSIUM SERPL-SCNC: 4.1 MMOL/L (ref 3.5–5.2)
SODIUM SERPL-SCNC: 136 MMOL/L (ref 136–145)
URATE SERPL-MCNC: 9.9 MG/DL (ref 3.4–7)

## 2020-07-01 PROCEDURE — 84132 ASSAY OF SERUM POTASSIUM: CPT | Performed by: INTERNAL MEDICINE

## 2020-07-01 PROCEDURE — 99254 IP/OBS CNSLTJ NEW/EST MOD 60: CPT | Performed by: INTERNAL MEDICINE

## 2020-07-01 PROCEDURE — 94799 UNLISTED PULMONARY SVC/PX: CPT

## 2020-07-01 PROCEDURE — 84550 ASSAY OF BLOOD/URIC ACID: CPT | Performed by: INTERNAL MEDICINE

## 2020-07-01 PROCEDURE — 80069 RENAL FUNCTION PANEL: CPT | Performed by: INTERNAL MEDICINE

## 2020-07-01 PROCEDURE — 83735 ASSAY OF MAGNESIUM: CPT | Performed by: INTERNAL MEDICINE

## 2020-07-01 PROCEDURE — 25010000002 MAGNESIUM SULFATE 2 GM/50ML SOLUTION: Performed by: INTERNAL MEDICINE

## 2020-07-01 PROCEDURE — 25010000002 METHYLPREDNISOLONE PER 125 MG: Performed by: INTERNAL MEDICINE

## 2020-07-01 PROCEDURE — 82330 ASSAY OF CALCIUM: CPT | Performed by: INTERNAL MEDICINE

## 2020-07-01 PROCEDURE — 93306 TTE W/DOPPLER COMPLETE: CPT | Performed by: INTERNAL MEDICINE

## 2020-07-01 PROCEDURE — 93306 TTE W/DOPPLER COMPLETE: CPT

## 2020-07-01 PROCEDURE — 36415 COLL VENOUS BLD VENIPUNCTURE: CPT | Performed by: INTERNAL MEDICINE

## 2020-07-01 RX ORDER — MAGNESIUM SULFATE HEPTAHYDRATE 40 MG/ML
4 INJECTION, SOLUTION INTRAVENOUS AS NEEDED
Status: DISCONTINUED | OUTPATIENT
Start: 2020-07-01 | End: 2020-07-01

## 2020-07-01 RX ORDER — POLYETHYLENE GLYCOL 3350 17 G/17G
17 POWDER, FOR SOLUTION ORAL DAILY
Status: DISCONTINUED | OUTPATIENT
Start: 2020-07-01 | End: 2020-07-05 | Stop reason: HOSPADM

## 2020-07-01 RX ORDER — POTASSIUM CHLORIDE 1.5 G/1.77G
40 POWDER, FOR SOLUTION ORAL AS NEEDED
Status: DISCONTINUED | OUTPATIENT
Start: 2020-07-01 | End: 2020-07-05 | Stop reason: HOSPADM

## 2020-07-01 RX ORDER — POTASSIUM CHLORIDE 7.45 MG/ML
10 INJECTION INTRAVENOUS
Status: DISCONTINUED | OUTPATIENT
Start: 2020-07-01 | End: 2020-07-05 | Stop reason: HOSPADM

## 2020-07-01 RX ORDER — IPRATROPIUM BROMIDE AND ALBUTEROL SULFATE 2.5; .5 MG/3ML; MG/3ML
3 SOLUTION RESPIRATORY (INHALATION) EVERY 6 HOURS PRN
Status: DISCONTINUED | OUTPATIENT
Start: 2020-07-01 | End: 2020-07-05 | Stop reason: HOSPADM

## 2020-07-01 RX ORDER — MAGNESIUM SULFATE HEPTAHYDRATE 40 MG/ML
2 INJECTION, SOLUTION INTRAVENOUS AS NEEDED
Status: DISCONTINUED | OUTPATIENT
Start: 2020-07-01 | End: 2020-07-01

## 2020-07-01 RX ORDER — POTASSIUM CHLORIDE 750 MG/1
40 CAPSULE, EXTENDED RELEASE ORAL AS NEEDED
Status: DISCONTINUED | OUTPATIENT
Start: 2020-07-01 | End: 2020-07-05 | Stop reason: HOSPADM

## 2020-07-01 RX ADMIN — HYDROCODONE BITARTRATE AND ACETAMINOPHEN 2 TABLET: 7.5; 325 TABLET ORAL at 15:03

## 2020-07-01 RX ADMIN — HYDROCODONE BITARTRATE AND ACETAMINOPHEN 2 TABLET: 7.5; 325 TABLET ORAL at 18:57

## 2020-07-01 RX ADMIN — HYDROCODONE BITARTRATE AND ACETAMINOPHEN 2 TABLET: 7.5; 325 TABLET ORAL at 23:02

## 2020-07-01 RX ADMIN — MAGNESIUM SULFATE HEPTAHYDRATE 2 G: 40 INJECTION, SOLUTION INTRAVENOUS at 12:47

## 2020-07-01 RX ADMIN — POTASSIUM CHLORIDE 40 MEQ: 10 CAPSULE, COATED, EXTENDED RELEASE ORAL at 10:44

## 2020-07-01 RX ADMIN — SODIUM CHLORIDE 75 ML/HR: 9 INJECTION, SOLUTION INTRAVENOUS at 06:10

## 2020-07-01 RX ADMIN — SODIUM CHLORIDE, PRESERVATIVE FREE 10 ML: 5 INJECTION INTRAVENOUS at 23:02

## 2020-07-01 RX ADMIN — POLYETHYLENE GLYCOL 3350 17 G: 17 POWDER, FOR SOLUTION ORAL at 14:57

## 2020-07-01 RX ADMIN — METHYLPREDNISOLONE SODIUM SUCCINATE 60 MG: 125 INJECTION, POWDER, FOR SOLUTION INTRAMUSCULAR; INTRAVENOUS at 09:54

## 2020-07-01 RX ADMIN — METHYLPREDNISOLONE SODIUM SUCCINATE 60 MG: 125 INJECTION, POWDER, FOR SOLUTION INTRAMUSCULAR; INTRAVENOUS at 04:41

## 2020-07-01 RX ADMIN — HYDROCODONE BITARTRATE AND ACETAMINOPHEN 2 TABLET: 7.5; 325 TABLET ORAL at 07:51

## 2020-07-01 RX ADMIN — IPRATROPIUM BROMIDE AND ALBUTEROL SULFATE 3 ML: .5; 3 SOLUTION RESPIRATORY (INHALATION) at 07:57

## 2020-07-01 RX ADMIN — POTASSIUM CHLORIDE 40 MEQ: 10 CAPSULE, COATED, EXTENDED RELEASE ORAL at 14:57

## 2020-07-01 NOTE — PLAN OF CARE
Problem: Patient Care Overview  Goal: Plan of Care Review  Outcome: Ongoing (interventions implemented as appropriate)  Flowsheets (Taken 7/1/2020 1959)  Progress: no change  Plan of Care Reviewed With: patient  Outcome Summary: To have back surgery tomorrow.  Taking pain med for back, pain 8-9 today.

## 2020-07-01 NOTE — PLAN OF CARE
Problem: Patient Care Overview  Goal: Plan of Care Review  Outcome: Ongoing (interventions implemented as appropriate)  Flowsheets (Taken 7/1/2020 0801)  Progress: improving  Plan of Care Reviewed With: patient  Outcome Summary: VSS. O2 SAT STABLE ON RA. PAIN PILL X 1 FOR BACK PAIN.

## 2020-07-01 NOTE — PROGRESS NOTES
Dr. ALEXYS Paniagua    [address]    7/1/2020    Patient ID:  Name:  Bonifacio Lewis  MRN:  [id number]  1970  50 y.o.  male            CC/Reason for visit: Lung nodules, back pain, suspect sarcoidosis, hypercalcemia    Interval hx: Patient underwent bronchoscopy yesterday with biopsies as well as ultrasound-guided fine-needle aspiration of mediastinal adenopathy.  Those results will not be back until after the holidays.  The patient was seen by orthopedist yesterday for chronic low back pain.  Scheduled for laminectomy now with prior cardiac evaluation.  He denies any cough, fever, chills or hemoptysis.      ROS: Does have some chronic low back pain.  No abdominal pain, no nausea    Vitals:  Vitals:    06/30/20 2257 07/01/20 0738 07/01/20 0757 07/01/20 0803   BP: 127/76 121/89     BP Location: Left arm Right arm     Patient Position: Lying Lying     Pulse: 91 78 89    Resp: 18 20 20 20   Temp: 98.2 °F (36.8 °C) 97.6 °F (36.4 °C)     TempSrc: Oral Oral     SpO2: 93% 95% 98%    Weight:       Height:               Body mass index is 21.58 kg/m².    Intake/Output Summary (Last 24 hours) at 7/1/2020 1333  Last data filed at 7/1/2020 1247  Gross per 24 hour   Intake 4303 ml   Output 1375 ml   Net 2928 ml       Exam:  GEN:  No distress  Alert, oriented x 3.   LUNGS: Clear breath sounds bilat, no use of accessory muscles  CV:  Normal S1S2, without murmur, no edema  ABD:  Non tender, no enlarged liver or masses      Scheduled meds:    methylPREDNISolone sodium succinate 60 mg Intravenous Q6H   sodium chloride 10 mL Intravenous Q12H     IV meds:                        sodium chloride 75 mL/hr Last Rate: 75 mL/hr (07/01/20 0753)   sodium chloride 30 mL/hr Last Rate: 30 mL/hr (06/30/20 1244)       Data Review:   I reviewed the patient's medications and new clinical results.    COVID19   Date Value Ref Range Status   06/26/2020 Not Detected Not Detected - Ref. Range Final         Lab Results   Component  Value Date    CALCIUM 10.5 07/01/2020    PHOS 2.9 07/01/2020    MG 1.3 (L) 07/01/2020    MG 1.2 (L) 06/30/2020    MG 1.6 06/26/2020     Results from last 7 days   Lab Units 07/01/20  0505 06/30/20  0621 06/29/20  0723  06/26/20  1708 06/26/20  1707   SODIUM mmol/L 136 138 132*   < > 128*  --    POTASSIUM mmol/L 3.5 3.7 3.7   < > 3.6  --    CHLORIDE mmol/L 99 99 96*   < > 92*  --    CO2 mmol/L 29.0 29.6* 28.6   < > 23.1  --    BUN mg/dL 22* 25* 20   < > 21*  --    CREATININE mg/dL 1.08 1.17 1.05   < > 1.86*  --    CALCIUM mg/dL 10.5 10.8* 11.4*   < > 14.0*  --    BILIRUBIN mg/dL  --   --   --   --  0.5  --    ALK PHOS U/L  --   --   --   --  83  --    ALT (SGPT) U/L  --   --   --   --  12  --    AST (SGOT) U/L  --   --   --   --  21  --    GLUCOSE mg/dL 132* 136* 130*   < > 78  --    WBC 10*3/mm3  --  10.84*  --   --   --  9.51   HEMOGLOBIN g/dL  --  12.1*  --   --   --  16.4   PLATELETS 10*3/mm3  --  309  --   --   --  346   INR   --   --   --   --  1.28*  --    PROBNP pg/mL  --   --   --   --  81.2  --    PROCALCITONIN ng/mL  --   --   --   --  0.17  --     < > = values in this interval not displayed.     Results from last 7 days   Lab Units 06/26/20  1819 06/26/20  1708   BLOODCX  No growth at 4 days No growth at 4 days         ASSESSMENT:   Hypercalcemia  Interstitial lung disease  Lung nodules  Mediastinal adenopathy  Chronic low back pain  Weight loss  Acute kidney injury on chronic kidney disease  Ongoing tobacco abuse      PLAN:  Patient being evaluated by cardiology for perioperative risk assessment.  Plan is for low back surgery with laminectomy tomorrow.  Bronchoscopy biopsies will be pending until after holidays.  Discharge date now depends on orthopedic surgery procedure and his postoperative recovery.  Nephrology assisted with management of hypercalcemia.  Patient has been on high-dose steroids.  I will back off on the steroids now in order to facilitate surgical healing.  He will need pharmacologic DVT  prophylaxis in the form of Lovenox as soon as okay per orthopedic surgery      Олег Paniagua MD  7/1/2020
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
## 2020-07-01 NOTE — NURSING NOTE
@1315 echo dept called and spoke with Adithya, told that echo has to be done today for surgery tomorrow.  She pulled up order while on phone and pt will have it done today.

## 2020-07-02 ENCOUNTER — ANESTHESIA (OUTPATIENT)
Dept: PERIOP | Facility: HOSPITAL | Age: 50
End: 2020-07-02

## 2020-07-02 ENCOUNTER — APPOINTMENT (OUTPATIENT)
Dept: GENERAL RADIOLOGY | Facility: HOSPITAL | Age: 50
End: 2020-07-02

## 2020-07-02 ENCOUNTER — ANESTHESIA EVENT (OUTPATIENT)
Dept: PERIOP | Facility: HOSPITAL | Age: 50
End: 2020-07-02

## 2020-07-02 LAB
ALBUMIN SERPL-MCNC: 2.8 G/DL (ref 3.5–5.2)
ANION GAP SERPL CALCULATED.3IONS-SCNC: 3.9 MMOL/L (ref 5–15)
AORTIC DIMENSIONLESS INDEX: 0.8 (DI)
BACTERIA SPEC AEROBE CULT: NORMAL
BH CV ECHO MEAS - ACS: 2.3 CM
BH CV ECHO MEAS - AO MAX PG (FULL): 2.7 MMHG
BH CV ECHO MEAS - AO MAX PG: 5.2 MMHG
BH CV ECHO MEAS - AO MEAN PG (FULL): 1.5 MMHG
BH CV ECHO MEAS - AO MEAN PG: 2.9 MMHG
BH CV ECHO MEAS - AO V2 MAX: 114 CM/SEC
BH CV ECHO MEAS - AO V2 MEAN: 79.9 CM/SEC
BH CV ECHO MEAS - AO V2 VTI: 19.8 CM
BH CV ECHO MEAS - AVA(I,A): 4 CM^2
BH CV ECHO MEAS - AVA(I,D): 4 CM^2
BH CV ECHO MEAS - AVA(V,A): 3.5 CM^2
BH CV ECHO MEAS - AVA(V,D): 3.5 CM^2
BH CV ECHO MEAS - BSA(HAYCOCK): 2.1 M^2
BH CV ECHO MEAS - BSA: 2.1 M^2
BH CV ECHO MEAS - BZI_BMI: 21.6 KILOGRAMS/M^2
BH CV ECHO MEAS - BZI_METRIC_HEIGHT: 195.6 CM
BH CV ECHO MEAS - BZI_METRIC_WEIGHT: 82.6 KG
BH CV ECHO MEAS - EDV(CUBED): 106.9 ML
BH CV ECHO MEAS - EDV(MOD-SP2): 113 ML
BH CV ECHO MEAS - EDV(MOD-SP4): 100 ML
BH CV ECHO MEAS - EDV(TEICH): 104.7 ML
BH CV ECHO MEAS - EF(CUBED): 64.2 %
BH CV ECHO MEAS - EF(MOD-BP): 62.7 %
BH CV ECHO MEAS - EF(MOD-SP2): 60.2 %
BH CV ECHO MEAS - EF(MOD-SP4): 66 %
BH CV ECHO MEAS - EF(TEICH): 55.7 %
BH CV ECHO MEAS - ESV(CUBED): 38.2 ML
BH CV ECHO MEAS - ESV(MOD-SP2): 45 ML
BH CV ECHO MEAS - ESV(MOD-SP4): 34 ML
BH CV ECHO MEAS - ESV(TEICH): 46.4 ML
BH CV ECHO MEAS - FS: 29 %
BH CV ECHO MEAS - IVS/LVPW: 1.2
BH CV ECHO MEAS - IVSD: 1 CM
BH CV ECHO MEAS - LAT PEAK E' VEL: 8.6 CM/SEC
BH CV ECHO MEAS - LV DIASTOLIC VOL/BSA (35-75): 46.5 ML/M^2
BH CV ECHO MEAS - LV MASS(C)D: 159.8 GRAMS
BH CV ECHO MEAS - LV MASS(C)DI: 74.4 GRAMS/M^2
BH CV ECHO MEAS - LV MAX PG: 2.5 MMHG
BH CV ECHO MEAS - LV MEAN PG: 1.4 MMHG
BH CV ECHO MEAS - LV SYSTOLIC VOL/BSA (12-30): 15.8 ML/M^2
BH CV ECHO MEAS - LV V1 MAX: 79.1 CM/SEC
BH CV ECHO MEAS - LV V1 MEAN: 55 CM/SEC
BH CV ECHO MEAS - LV V1 VTI: 15.7 CM
BH CV ECHO MEAS - LVIDD: 4.7 CM
BH CV ECHO MEAS - LVIDS: 3.4 CM
BH CV ECHO MEAS - LVLD AP2: 8.1 CM
BH CV ECHO MEAS - LVLD AP4: 7.7 CM
BH CV ECHO MEAS - LVLS AP2: 6.8 CM
BH CV ECHO MEAS - LVLS AP4: 6.1 CM
BH CV ECHO MEAS - LVOT AREA (M): 4.9 CM^2
BH CV ECHO MEAS - LVOT AREA: 5 CM^2
BH CV ECHO MEAS - LVOT DIAM: 2.5 CM
BH CV ECHO MEAS - LVPWD: 0.9 CM
BH CV ECHO MEAS - MED PEAK E' VEL: 9.4 CM/SEC
BH CV ECHO MEAS - MV A DUR: 0.12 SEC
BH CV ECHO MEAS - MV A MAX VEL: 52.4 CM/SEC
BH CV ECHO MEAS - MV DEC SLOPE: 234.1 CM/SEC^2
BH CV ECHO MEAS - MV DEC TIME: 220 SEC
BH CV ECHO MEAS - MV E MAX VEL: 64.5 CM/SEC
BH CV ECHO MEAS - MV E/A: 1.2
BH CV ECHO MEAS - MV MAX PG: 1.7 MMHG
BH CV ECHO MEAS - MV MEAN PG: 0.89 MMHG
BH CV ECHO MEAS - MV P1/2T MAX VEL: 69.2 CM/SEC
BH CV ECHO MEAS - MV P1/2T: 86.6 MSEC
BH CV ECHO MEAS - MV V2 MAX: 65.2 CM/SEC
BH CV ECHO MEAS - MV V2 MEAN: 44.7 CM/SEC
BH CV ECHO MEAS - MV V2 VTI: 21.1 CM
BH CV ECHO MEAS - MVA P1/2T LCG: 3.2 CM^2
BH CV ECHO MEAS - MVA(P1/2T): 2.5 CM^2
BH CV ECHO MEAS - MVA(VTI): 3.7 CM^2
BH CV ECHO MEAS - PA ACC TIME: 0.14 SEC
BH CV ECHO MEAS - PA MAX PG (FULL): 1.9 MMHG
BH CV ECHO MEAS - PA MAX PG: 2.7 MMHG
BH CV ECHO MEAS - PA PR(ACCEL): 17.2 MMHG
BH CV ECHO MEAS - PA V2 MAX: 82.8 CM/SEC
BH CV ECHO MEAS - PULM A REVS DUR: 0.13 SEC
BH CV ECHO MEAS - PULM A REVS VEL: 28.7 CM/SEC
BH CV ECHO MEAS - PULM DIAS VEL: 43 CM/SEC
BH CV ECHO MEAS - PULM S/D: 0.68
BH CV ECHO MEAS - PULM SYS VEL: 29.2 CM/SEC
BH CV ECHO MEAS - PVA(V,A): 1.8 CM^2
BH CV ECHO MEAS - PVA(V,D): 1.8 CM^2
BH CV ECHO MEAS - QP/QS: 0.35
BH CV ECHO MEAS - RAP SYSTOLE: 8 MMHG
BH CV ECHO MEAS - RV BASE (<4.1) - OBSOLETE: 3.2 CM
BH CV ECHO MEAS - RV LENGTH (<8.5) - OBSOLETE: 6.7 CM
BH CV ECHO MEAS - RV MAX PG: 0.89 MMHG
BH CV ECHO MEAS - RV MEAN PG: 0.48 MMHG
BH CV ECHO MEAS - RV V1 MAX: 47.1 CM/SEC
BH CV ECHO MEAS - RV V1 MEAN: 32.8 CM/SEC
BH CV ECHO MEAS - RV V1 VTI: 8.7 CM
BH CV ECHO MEAS - RVOT AREA: 3.2 CM^2
BH CV ECHO MEAS - RVOT DIAM: 2 CM
BH CV ECHO MEAS - RVSP: 25 MMHG
BH CV ECHO MEAS - SI(CUBED): 31.9 ML/M^2
BH CV ECHO MEAS - SI(LVOT): 36.8 ML/M^2
BH CV ECHO MEAS - SI(MOD-SP2): 31.6 ML/M^2
BH CV ECHO MEAS - SI(MOD-SP4): 30.7 ML/M^2
BH CV ECHO MEAS - SI(TEICH): 27.1 ML/M^2
BH CV ECHO MEAS - SV(CUBED): 68.6 ML
BH CV ECHO MEAS - SV(LVOT): 79.1 ML
BH CV ECHO MEAS - SV(MOD-SP2): 68 ML
BH CV ECHO MEAS - SV(MOD-SP4): 66 ML
BH CV ECHO MEAS - SV(RVOT): 27.9 ML
BH CV ECHO MEAS - SV(TEICH): 58.3 ML
BH CV ECHO MEAS - TAPSE (>1.6): 2.7 CM2
BH CV ECHO MEAS - TR MAX VEL: 205 CM/SEC
BH CV ECHO MEASUREMENTS AVERAGE E/E' RATIO: 7.17
BH CV VAS BP RIGHT ARM: NORMAL MMHG
BH CV XLRA - RV BASE: 3.15 CM
BH CV XLRA - RV LENGTH: 7 CM
BH CV XLRA - RV MID: 1.9 CM
BH CV XLRA - TDI S': 10 CM/SEC
BUN SERPL-MCNC: 22 MG/DL (ref 6–20)
BUN/CREAT SERPL: 21.6 (ref 7–25)
CA-I BLD-MCNC: 6.5 MG/DL (ref 4.6–5.4)
CA-I SERPL ISE-MCNC: 1.62 MMOL/L (ref 1.15–1.35)
CALCIUM SPEC-SCNC: 10.6 MG/DL (ref 8.6–10.5)
CHLORIDE SERPL-SCNC: 104 MMOL/L (ref 98–107)
CO2 SERPL-SCNC: 29.1 MMOL/L (ref 22–29)
CREAT SERPL-MCNC: 1.02 MG/DL (ref 0.76–1.27)
CYTO UR: NORMAL
GFR SERPL CREATININE-BSD FRML MDRD: 94 ML/MIN/1.73
GLUCOSE SERPL-MCNC: 102 MG/DL (ref 65–99)
GRAM STN SPEC: NORMAL
GRAM STN SPEC: NORMAL
HCT VFR BLD AUTO: 38 % (ref 37.5–51)
HGB BLD-MCNC: 13.2 G/DL (ref 13–17.7)
LAB AP CASE REPORT: NORMAL
LAB AP CLINICAL INFORMATION: NORMAL
LEFT ATRIUM VOLUME INDEX: 22 ML/M2
LV EF 2D ECHO EST: 63 %
MAGNESIUM SERPL-MCNC: 1.6 MG/DL (ref 1.6–2.6)
MAXIMAL PREDICTED HEART RATE: 171 BPM
PATH REPORT.FINAL DX SPEC: NORMAL
PATH REPORT.GROSS SPEC: NORMAL
PHOSPHATE SERPL-MCNC: 7.2 MG/DL (ref 2.5–4.5)
POTASSIUM SERPL-SCNC: 3.9 MMOL/L (ref 3.5–5.2)
SODIUM SERPL-SCNC: 137 MMOL/L (ref 136–145)
STRESS TARGET HR: 145 BPM

## 2020-07-02 PROCEDURE — 25010000002 HYDROMORPHONE PER 4 MG: Performed by: ANESTHESIOLOGY

## 2020-07-02 PROCEDURE — 25010000002 PHENYLEPHRINE PER 1 ML: Performed by: ANESTHESIOLOGY

## 2020-07-02 PROCEDURE — 25010000002 DEXAMETHASONE PER 1 MG: Performed by: ANESTHESIOLOGY

## 2020-07-02 PROCEDURE — 25010000003 CEFAZOLIN PER 500 MG: Performed by: ORTHOPAEDIC SURGERY

## 2020-07-02 PROCEDURE — C1713 ANCHOR/SCREW BN/BN,TIS/BN: HCPCS | Performed by: ORTHOPAEDIC SURGERY

## 2020-07-02 PROCEDURE — 25010000002 NEOSTIGMINE PER 0.5 MG: Performed by: ANESTHESIOLOGY

## 2020-07-02 PROCEDURE — 25010000002 KETOROLAC TROMETHAMINE PER 15 MG: Performed by: ANESTHESIOLOGY

## 2020-07-02 PROCEDURE — 25010000002 FENTANYL CITRATE (PF) 100 MCG/2ML SOLUTION: Performed by: ANESTHESIOLOGY

## 2020-07-02 PROCEDURE — 20936 SP BONE AGRFT LOCAL ADD-ON: CPT | Performed by: ORTHOPAEDIC SURGERY

## 2020-07-02 PROCEDURE — 07DR3ZZ EXTRACTION OF ILIAC BONE MARROW, PERCUTANEOUS APPROACH: ICD-10-PCS | Performed by: ORTHOPAEDIC SURGERY

## 2020-07-02 PROCEDURE — 25010000002 MIDAZOLAM PER 1 MG: Performed by: ANESTHESIOLOGY

## 2020-07-02 PROCEDURE — 25010000002 MIDAZOLAM PER 1 MG

## 2020-07-02 PROCEDURE — 72100 X-RAY EXAM L-S SPINE 2/3 VWS: CPT

## 2020-07-02 PROCEDURE — 85018 HEMOGLOBIN: CPT | Performed by: ORTHOPAEDIC SURGERY

## 2020-07-02 PROCEDURE — 01NB3ZZ RELEASE LUMBAR NERVE, PERCUTANEOUS APPROACH: ICD-10-PCS | Performed by: ORTHOPAEDIC SURGERY

## 2020-07-02 PROCEDURE — 83735 ASSAY OF MAGNESIUM: CPT | Performed by: INTERNAL MEDICINE

## 2020-07-02 PROCEDURE — 76000 FLUOROSCOPY <1 HR PHYS/QHP: CPT

## 2020-07-02 PROCEDURE — 22612 ARTHRD PST TQ 1NTRSPC LUMBAR: CPT | Performed by: ORTHOPAEDIC SURGERY

## 2020-07-02 PROCEDURE — 85014 HEMATOCRIT: CPT | Performed by: ORTHOPAEDIC SURGERY

## 2020-07-02 PROCEDURE — 0SG0071 FUSION OF LUMBAR VERTEBRAL JOINT WITH AUTOLOGOUS TISSUE SUBSTITUTE, POSTERIOR APPROACH, POSTERIOR COLUMN, OPEN APPROACH: ICD-10-PCS | Performed by: ORTHOPAEDIC SURGERY

## 2020-07-02 PROCEDURE — 20939 BONE MARROW ASPIR BONE GRFG: CPT | Performed by: ORTHOPAEDIC SURGERY

## 2020-07-02 PROCEDURE — 63047 LAM FACETEC & FORAMOT LUMBAR: CPT | Performed by: ORTHOPAEDIC SURGERY

## 2020-07-02 PROCEDURE — 82330 ASSAY OF CALCIUM: CPT | Performed by: INTERNAL MEDICINE

## 2020-07-02 PROCEDURE — 25010000002 PROPOFOL 10 MG/ML EMULSION: Performed by: ANESTHESIOLOGY

## 2020-07-02 PROCEDURE — 22840 INSERT SPINE FIXATION DEVICE: CPT | Performed by: ORTHOPAEDIC SURGERY

## 2020-07-02 PROCEDURE — 80069 RENAL FUNCTION PANEL: CPT | Performed by: INTERNAL MEDICINE

## 2020-07-02 PROCEDURE — 25010000003 CEFAZOLIN IN DEXTROSE 2-4 GM/100ML-% SOLUTION: Performed by: ORTHOPAEDIC SURGERY

## 2020-07-02 PROCEDURE — 20930 SP BONE ALGRFT MORSEL ADD-ON: CPT | Performed by: ORTHOPAEDIC SURGERY

## 2020-07-02 PROCEDURE — 25010000002 METHOCARBAMOL 1000 MG/10ML SOLUTION: Performed by: ORTHOPAEDIC SURGERY

## 2020-07-02 PROCEDURE — 0SB23ZZ EXCISION OF LUMBAR VERTEBRAL DISC, PERCUTANEOUS APPROACH: ICD-10-PCS | Performed by: ORTHOPAEDIC SURGERY

## 2020-07-02 PROCEDURE — 25010000002 HYDROMORPHONE 1 MG/ML SOLUTION

## 2020-07-02 PROCEDURE — 25010000003 HYDROMORPHONE HCL PF 50 MG/5ML SOLUTION: Performed by: ORTHOPAEDIC SURGERY

## 2020-07-02 DEVICE — SCRW INNR EXPEDIUM: Type: IMPLANTABLE DEVICE | Status: FUNCTIONAL

## 2020-07-02 DEVICE — ROD PREBNT SPINE EXPEDIUM TI 5.5X40MM: Type: IMPLANTABLE DEVICE | Status: FUNCTIONAL

## 2020-07-02 DEVICE — SEALANT WND FIBRIN TISSEEL PREFIL/SYR/PRIMAFZ 4ML: Type: IMPLANTABLE DEVICE | Status: FUNCTIONAL

## 2020-07-02 DEVICE — MATRX STRIP PILAFX DBM 50X25X4MM: Type: IMPLANTABLE DEVICE | Status: FUNCTIONAL

## 2020-07-02 DEVICE — WAX BONE HEMO NAT 2.5G: Type: IMPLANTABLE DEVICE | Status: FUNCTIONAL

## 2020-07-02 DEVICE — FLOSEAL HEMOSTATIC MATRIX, 10ML
Type: IMPLANTABLE DEVICE | Status: FUNCTIONAL
Brand: FLOSEAL HEMOSTATIC MATRIX

## 2020-07-02 DEVICE — SCRW EXPEDIUM PA TI 6X45MM: Type: IMPLANTABLE DEVICE | Status: FUNCTIONAL

## 2020-07-02 RX ORDER — HYDROCODONE BITARTRATE AND ACETAMINOPHEN 5; 325 MG/1; MG/1
1 TABLET ORAL ONCE AS NEEDED
Status: COMPLETED | OUTPATIENT
Start: 2020-07-02 | End: 2020-07-02

## 2020-07-02 RX ORDER — MIDAZOLAM HYDROCHLORIDE 1 MG/ML
INJECTION INTRAMUSCULAR; INTRAVENOUS
Status: COMPLETED
Start: 2020-07-02 | End: 2020-07-02

## 2020-07-02 RX ORDER — PROMETHAZINE HYDROCHLORIDE 25 MG/1
25 SUPPOSITORY RECTAL ONCE AS NEEDED
Status: DISCONTINUED | OUTPATIENT
Start: 2020-07-02 | End: 2020-07-02 | Stop reason: HOSPADM

## 2020-07-02 RX ORDER — DEXAMETHASONE SODIUM PHOSPHATE 10 MG/ML
INJECTION INTRAMUSCULAR; INTRAVENOUS AS NEEDED
Status: DISCONTINUED | OUTPATIENT
Start: 2020-07-02 | End: 2020-07-02 | Stop reason: SURG

## 2020-07-02 RX ORDER — NALBUPHINE HCL 10 MG/ML
10 AMPUL (ML) INJECTION EVERY 4 HOURS PRN
Status: DISCONTINUED | OUTPATIENT
Start: 2020-07-02 | End: 2020-07-02 | Stop reason: HOSPADM

## 2020-07-02 RX ORDER — LIDOCAINE HYDROCHLORIDE 10 MG/ML
0.5 INJECTION, SOLUTION EPIDURAL; INFILTRATION; INTRACAUDAL; PERINEURAL ONCE AS NEEDED
Status: DISCONTINUED | OUTPATIENT
Start: 2020-07-02 | End: 2020-07-02 | Stop reason: HOSPADM

## 2020-07-02 RX ORDER — NALBUPHINE HCL 10 MG/ML
2 AMPUL (ML) INJECTION EVERY 4 HOURS PRN
Status: DISCONTINUED | OUTPATIENT
Start: 2020-07-02 | End: 2020-07-02 | Stop reason: HOSPADM

## 2020-07-02 RX ORDER — HYDROMORPHONE HYDROCHLORIDE 1 MG/ML
0.5 INJECTION, SOLUTION INTRAMUSCULAR; INTRAVENOUS; SUBCUTANEOUS
Status: DISCONTINUED | OUTPATIENT
Start: 2020-07-02 | End: 2020-07-02 | Stop reason: HOSPADM

## 2020-07-02 RX ORDER — SODIUM CHLORIDE 0.9 % (FLUSH) 0.9 %
3-10 SYRINGE (ML) INJECTION AS NEEDED
Status: DISCONTINUED | OUTPATIENT
Start: 2020-07-02 | End: 2020-07-02 | Stop reason: HOSPADM

## 2020-07-02 RX ORDER — DOCUSATE SODIUM 100 MG/1
100 CAPSULE, LIQUID FILLED ORAL 2 TIMES DAILY
Status: DISCONTINUED | OUTPATIENT
Start: 2020-07-02 | End: 2020-07-05 | Stop reason: HOSPADM

## 2020-07-02 RX ORDER — PROMETHAZINE HYDROCHLORIDE 25 MG/ML
6.25 INJECTION, SOLUTION INTRAMUSCULAR; INTRAVENOUS ONCE AS NEEDED
Status: DISCONTINUED | OUTPATIENT
Start: 2020-07-02 | End: 2020-07-02 | Stop reason: HOSPADM

## 2020-07-02 RX ORDER — NALOXONE HCL 0.4 MG/ML
0.4 VIAL (ML) INJECTION AS NEEDED
Status: DISCONTINUED | OUTPATIENT
Start: 2020-07-02 | End: 2020-07-02 | Stop reason: HOSPADM

## 2020-07-02 RX ORDER — DIPHENHYDRAMINE HYDROCHLORIDE 50 MG/ML
12.5 INJECTION INTRAMUSCULAR; INTRAVENOUS
Status: DISCONTINUED | OUTPATIENT
Start: 2020-07-02 | End: 2020-07-02 | Stop reason: HOSPADM

## 2020-07-02 RX ORDER — METHOCARBAMOL 100 MG/ML
1000 INJECTION, SOLUTION INTRAMUSCULAR; INTRAVENOUS ONCE
Status: COMPLETED | OUTPATIENT
Start: 2020-07-02 | End: 2020-07-02

## 2020-07-02 RX ORDER — ROCURONIUM BROMIDE 10 MG/ML
INJECTION, SOLUTION INTRAVENOUS AS NEEDED
Status: DISCONTINUED | OUTPATIENT
Start: 2020-07-02 | End: 2020-07-02 | Stop reason: SURG

## 2020-07-02 RX ORDER — HYDRALAZINE HYDROCHLORIDE 20 MG/ML
5 INJECTION INTRAMUSCULAR; INTRAVENOUS
Status: DISCONTINUED | OUTPATIENT
Start: 2020-07-02 | End: 2020-07-02 | Stop reason: HOSPADM

## 2020-07-02 RX ORDER — SODIUM CHLORIDE 9 MG/ML
100 INJECTION, SOLUTION INTRAVENOUS CONTINUOUS
Status: DISCONTINUED | OUTPATIENT
Start: 2020-07-02 | End: 2020-07-05

## 2020-07-02 RX ORDER — NALOXONE HCL 0.4 MG/ML
0.1 VIAL (ML) INJECTION
Status: DISCONTINUED | OUTPATIENT
Start: 2020-07-02 | End: 2020-07-02

## 2020-07-02 RX ORDER — MIDAZOLAM HYDROCHLORIDE 1 MG/ML
1 INJECTION INTRAMUSCULAR; INTRAVENOUS
Status: COMPLETED | OUTPATIENT
Start: 2020-07-02 | End: 2020-07-02

## 2020-07-02 RX ORDER — HYDROMORPHONE HCL IN 0.9% NACL 10 MG/50ML
PATIENT CONTROLLED ANALGESIA SYRINGE INTRAVENOUS CONTINUOUS
Status: DISCONTINUED | OUTPATIENT
Start: 2020-07-02 | End: 2020-07-04

## 2020-07-02 RX ORDER — FAMOTIDINE 10 MG/ML
20 INJECTION, SOLUTION INTRAVENOUS ONCE
Status: COMPLETED | OUTPATIENT
Start: 2020-07-02 | End: 2020-07-02

## 2020-07-02 RX ORDER — FENTANYL CITRATE 50 UG/ML
INJECTION, SOLUTION INTRAMUSCULAR; INTRAVENOUS AS NEEDED
Status: DISCONTINUED | OUTPATIENT
Start: 2020-07-02 | End: 2020-07-02 | Stop reason: SURG

## 2020-07-02 RX ORDER — SODIUM CHLORIDE 0.9 % (FLUSH) 0.9 %
3 SYRINGE (ML) INJECTION EVERY 12 HOURS SCHEDULED
Status: DISCONTINUED | OUTPATIENT
Start: 2020-07-02 | End: 2020-07-05 | Stop reason: HOSPADM

## 2020-07-02 RX ORDER — CEFAZOLIN SODIUM 2 G/100ML
2 INJECTION, SOLUTION INTRAVENOUS ONCE
Status: COMPLETED | OUTPATIENT
Start: 2020-07-02 | End: 2020-07-02

## 2020-07-02 RX ORDER — GLYCOPYRROLATE 0.2 MG/ML
INJECTION INTRAMUSCULAR; INTRAVENOUS AS NEEDED
Status: DISCONTINUED | OUTPATIENT
Start: 2020-07-02 | End: 2020-07-02 | Stop reason: SURG

## 2020-07-02 RX ORDER — OXYCODONE HYDROCHLORIDE AND ACETAMINOPHEN 5; 325 MG/1; MG/1
2 TABLET ORAL EVERY 4 HOURS PRN
Status: DISCONTINUED | OUTPATIENT
Start: 2020-07-02 | End: 2020-07-05 | Stop reason: HOSPADM

## 2020-07-02 RX ORDER — PROPOFOL 10 MG/ML
VIAL (ML) INTRAVENOUS AS NEEDED
Status: DISCONTINUED | OUTPATIENT
Start: 2020-07-02 | End: 2020-07-02 | Stop reason: SURG

## 2020-07-02 RX ORDER — KETOROLAC TROMETHAMINE 30 MG/ML
INJECTION, SOLUTION INTRAMUSCULAR; INTRAVENOUS AS NEEDED
Status: DISCONTINUED | OUTPATIENT
Start: 2020-07-02 | End: 2020-07-02 | Stop reason: SURG

## 2020-07-02 RX ORDER — CYCLOBENZAPRINE HCL 10 MG
10 TABLET ORAL 3 TIMES DAILY PRN
Status: DISCONTINUED | OUTPATIENT
Start: 2020-07-02 | End: 2020-07-05 | Stop reason: HOSPADM

## 2020-07-02 RX ORDER — SODIUM CHLORIDE 0.9 % (FLUSH) 0.9 %
3 SYRINGE (ML) INJECTION EVERY 12 HOURS SCHEDULED
Status: DISCONTINUED | OUTPATIENT
Start: 2020-07-02 | End: 2020-07-02 | Stop reason: HOSPADM

## 2020-07-02 RX ORDER — PREDNISONE 20 MG/1
40 TABLET ORAL
Status: DISCONTINUED | OUTPATIENT
Start: 2020-07-03 | End: 2020-07-03

## 2020-07-02 RX ORDER — POLYETHYLENE GLYCOL 3350 17 G/17G
17 POWDER, FOR SOLUTION ORAL DAILY
Status: DISCONTINUED | OUTPATIENT
Start: 2020-07-03 | End: 2020-07-05 | Stop reason: HOSPADM

## 2020-07-02 RX ORDER — FENTANYL CITRATE 50 UG/ML
50 INJECTION, SOLUTION INTRAMUSCULAR; INTRAVENOUS
Status: DISCONTINUED | OUTPATIENT
Start: 2020-07-02 | End: 2020-07-02 | Stop reason: HOSPADM

## 2020-07-02 RX ORDER — PROMETHAZINE HYDROCHLORIDE 25 MG/1
25 TABLET ORAL ONCE AS NEEDED
Status: DISCONTINUED | OUTPATIENT
Start: 2020-07-02 | End: 2020-07-02 | Stop reason: HOSPADM

## 2020-07-02 RX ORDER — TEMAZEPAM 15 MG/1
15 CAPSULE ORAL NIGHTLY PRN
Status: DISCONTINUED | OUTPATIENT
Start: 2020-07-02 | End: 2020-07-05 | Stop reason: HOSPADM

## 2020-07-02 RX ORDER — CEFAZOLIN SODIUM 2 G/100ML
2 INJECTION, SOLUTION INTRAVENOUS EVERY 8 HOURS
Status: COMPLETED | OUTPATIENT
Start: 2020-07-03 | End: 2020-07-03

## 2020-07-02 RX ORDER — SODIUM CHLORIDE, SODIUM LACTATE, POTASSIUM CHLORIDE, CALCIUM CHLORIDE 600; 310; 30; 20 MG/100ML; MG/100ML; MG/100ML; MG/100ML
9 INJECTION, SOLUTION INTRAVENOUS CONTINUOUS
Status: DISCONTINUED | OUTPATIENT
Start: 2020-07-02 | End: 2020-07-05

## 2020-07-02 RX ORDER — MIDAZOLAM HYDROCHLORIDE 1 MG/ML
1 INJECTION INTRAMUSCULAR; INTRAVENOUS
Status: DISCONTINUED | OUTPATIENT
Start: 2020-07-02 | End: 2020-07-02 | Stop reason: HOSPADM

## 2020-07-02 RX ORDER — LIDOCAINE HYDROCHLORIDE 20 MG/ML
INJECTION, SOLUTION INFILTRATION; PERINEURAL AS NEEDED
Status: DISCONTINUED | OUTPATIENT
Start: 2020-07-02 | End: 2020-07-02 | Stop reason: SURG

## 2020-07-02 RX ORDER — SODIUM CHLORIDE 0.9 % (FLUSH) 0.9 %
10 SYRINGE (ML) INJECTION AS NEEDED
Status: DISCONTINUED | OUTPATIENT
Start: 2020-07-02 | End: 2020-07-05 | Stop reason: HOSPADM

## 2020-07-02 RX ORDER — ACETAMINOPHEN 325 MG/1
650 TABLET ORAL ONCE AS NEEDED
Status: DISCONTINUED | OUTPATIENT
Start: 2020-07-02 | End: 2020-07-02 | Stop reason: HOSPADM

## 2020-07-02 RX ADMIN — CYCLOBENZAPRINE 10 MG: 10 TABLET, FILM COATED ORAL at 20:20

## 2020-07-02 RX ADMIN — GLYCOPYRROLATE 0.4 MG: 0.2 INJECTION INTRAMUSCULAR; INTRAVENOUS at 18:21

## 2020-07-02 RX ADMIN — FAMOTIDINE 20 MG: 10 INJECTION INTRAVENOUS at 14:04

## 2020-07-02 RX ADMIN — MIDAZOLAM 1 MG: 1 INJECTION INTRAMUSCULAR; INTRAVENOUS at 19:11

## 2020-07-02 RX ADMIN — HYDROMORPHONE HYDROCHLORIDE: 10 INJECTION INTRAMUSCULAR; INTRAVENOUS; SUBCUTANEOUS at 19:28

## 2020-07-02 RX ADMIN — SODIUM CHLORIDE 100 ML/HR: 9 INJECTION, SOLUTION INTRAVENOUS at 22:27

## 2020-07-02 RX ADMIN — HYDROMORPHONE HYDROCHLORIDE 0.5 MG: 1 INJECTION, SOLUTION INTRAMUSCULAR; INTRAVENOUS; SUBCUTANEOUS at 20:02

## 2020-07-02 RX ADMIN — HYDROMORPHONE HYDROCHLORIDE 0.5 MG: 1 INJECTION, SOLUTION INTRAMUSCULAR; INTRAVENOUS; SUBCUTANEOUS at 18:50

## 2020-07-02 RX ADMIN — FENTANYL CITRATE 50 MCG: 50 INJECTION, SOLUTION INTRAMUSCULAR; INTRAVENOUS at 14:04

## 2020-07-02 RX ADMIN — FENTANYL CITRATE 50 MCG: 50 INJECTION, SOLUTION INTRAMUSCULAR; INTRAVENOUS at 19:16

## 2020-07-02 RX ADMIN — SODIUM CHLORIDE, POTASSIUM CHLORIDE, SODIUM LACTATE AND CALCIUM CHLORIDE 9 ML/HR: 600; 310; 30; 20 INJECTION, SOLUTION INTRAVENOUS at 14:09

## 2020-07-02 RX ADMIN — LIDOCAINE HYDROCHLORIDE 100 MG: 20 INJECTION, SOLUTION INFILTRATION; PERINEURAL at 16:25

## 2020-07-02 RX ADMIN — DEXAMETHASONE SODIUM PHOSPHATE 8 MG: 10 INJECTION INTRAMUSCULAR; INTRAVENOUS at 16:25

## 2020-07-02 RX ADMIN — MIDAZOLAM 1 MG: 1 INJECTION INTRAMUSCULAR; INTRAVENOUS at 19:00

## 2020-07-02 RX ADMIN — SODIUM CHLORIDE, PRESERVATIVE FREE 10 ML: 5 INJECTION INTRAVENOUS at 08:20

## 2020-07-02 RX ADMIN — FENTANYL CITRATE 50 MCG: 50 INJECTION, SOLUTION INTRAMUSCULAR; INTRAVENOUS at 19:23

## 2020-07-02 RX ADMIN — DOCUSATE SODIUM 100 MG: 100 CAPSULE ORAL at 22:24

## 2020-07-02 RX ADMIN — NEOSTIGMINE METHYLSULFATE 2.5 MG: 1 INJECTION INTRAMUSCULAR; INTRAVENOUS; SUBCUTANEOUS at 18:21

## 2020-07-02 RX ADMIN — FENTANYL CITRATE 50 MCG: 50 INJECTION, SOLUTION INTRAMUSCULAR; INTRAVENOUS at 14:56

## 2020-07-02 RX ADMIN — KETOROLAC TROMETHAMINE 30 MG: 30 INJECTION, SOLUTION INTRAMUSCULAR; INTRAVENOUS at 18:21

## 2020-07-02 RX ADMIN — CEFAZOLIN SODIUM 2 G: 2 INJECTION, SOLUTION INTRAVENOUS at 23:47

## 2020-07-02 RX ADMIN — HYDROCODONE BITARTRATE AND ACETAMINOPHEN 2 TABLET: 7.5; 325 TABLET ORAL at 08:19

## 2020-07-02 RX ADMIN — HYDROMORPHONE HYDROCHLORIDE 0.5 MG: 1 INJECTION, SOLUTION INTRAMUSCULAR; INTRAVENOUS; SUBCUTANEOUS at 19:00

## 2020-07-02 RX ADMIN — SODIUM CHLORIDE, PRESERVATIVE FREE 3 ML: 5 INJECTION INTRAVENOUS at 22:25

## 2020-07-02 RX ADMIN — ROCURONIUM BROMIDE 50 MG: 10 INJECTION INTRAVENOUS at 16:25

## 2020-07-02 RX ADMIN — FENTANYL CITRATE 50 MCG: 50 INJECTION INTRAMUSCULAR; INTRAVENOUS at 18:42

## 2020-07-02 RX ADMIN — PHENYLEPHRINE HYDROCHLORIDE 100 MCG: 10 INJECTION INTRAVENOUS at 17:03

## 2020-07-02 RX ADMIN — MIDAZOLAM 1 MG: 1 INJECTION INTRAMUSCULAR; INTRAVENOUS at 14:56

## 2020-07-02 RX ADMIN — HYDROMORPHONE HYDROCHLORIDE 0.5 MG: 1 INJECTION, SOLUTION INTRAMUSCULAR; INTRAVENOUS; SUBCUTANEOUS at 19:10

## 2020-07-02 RX ADMIN — HYDROCODONE BITARTRATE AND ACETAMINOPHEN 1 TABLET: 5; 325 TABLET ORAL at 20:20

## 2020-07-02 RX ADMIN — FENTANYL CITRATE 50 MCG: 50 INJECTION INTRAMUSCULAR; INTRAVENOUS at 18:44

## 2020-07-02 RX ADMIN — MIDAZOLAM 1 MG: 1 INJECTION INTRAMUSCULAR; INTRAVENOUS at 14:04

## 2020-07-02 RX ADMIN — METHOCARBAMOL 1000 MG: 100 INJECTION INTRAMUSCULAR; INTRAVENOUS at 19:28

## 2020-07-02 RX ADMIN — CEFAZOLIN SODIUM 2 G: 2 INJECTION, SOLUTION INTRAVENOUS at 16:14

## 2020-07-02 RX ADMIN — PROPOFOL 200 MG: 10 INJECTION, EMULSION INTRAVENOUS at 16:25

## 2020-07-02 NOTE — PROGRESS NOTES
[facility]     Mar/chart reviewed  Follow up sarcoidosis, hypercalcemia  He is s/p laminectomy, seen in recovery  He arouses only briefly, unable to provide subjective    Vital Sign Min/Max for last 24 hours  Temp  Min: 97.8 °F (36.6 °C)  Max: 98.8 °F (37.1 °C)   BP  Min: 121/80  Max: 155/106   Pulse  Min: 55  Max: 86   Resp  Min: 16  Max: 18   SpO2  Min: 93 %  Max: 100 %   Flow (L/min)  Min: 4  Max: 4   No data recorded     Appears ill, sedated but does arouse briefly  perrl,  no icterus,  mmm, no jvd, trachea midline, neck supple,  chest cta bilaterally, no crackles, no wheezes,   rrr,   soft, nt, nd +bs,  no c/c/ e  Skin warm, dry no rashes    Labs: 7/2: reviewed:  Bicarb 29  Calcium 10.6  Albumin 2.8    Path-- confirms non caseating granulomas      ASSESSMENT:            Hypercalcemia  Interstitial lung disease  Lung nodules  Mediastinal adenopathy  Chronic low back pain  Weight loss  Acute kidney injury on chronic kidney disease  Ongoing tobacco abuse        PLAN:  Post operative care as per ortho  Will need to continue on steroids at some dose given sarcoidosis.

## 2020-07-02 NOTE — PLAN OF CARE
Problem: Patient Care Overview  Goal: Plan of Care Review  Outcome: Ongoing (interventions implemented as appropriate)  Flowsheets (Taken 7/2/2020 1355)  Progress: no change  Plan of Care Reviewed With: patient  Outcome Summary: VSS. PAIN PILLS AS REQUESTED FOR BACK PAIN. RESTED WELL OVERNIGHT AFTER MEDICATED. FOR BACK SURGERY TODAY.

## 2020-07-02 NOTE — OP NOTE
Operative note    Pre-op diagnosis: L4-5 retrolisthesis with spinal stenosis    Postoperative diagnosis: Same    Procedure: L4-5 laminectomy medial facetectomy foraminotomy bilateral posterior lateral fusion with [employer] expedient instrumentation with local bone graft, right iliac marrow aspiration, and Pliafix  bone graft substitute.    Surgeon: Natalio Bustillo Jr, M.D.    Asst.: Maria Jon    EBL: 100 cc    Anesthetic: Gen.    Condition: Satisfactory        Description of procedure: Patient was anesthetized and positioned prone.  Bony prominences were well padded.  The back was prepped and draped in sterile fashion.  Incision was made down to lumbodorsal fascia.  The muscle was stripped subperiosteally to the tips of transverse processes.  Curettes and rongeurs removed adherent soft tissue.  Packs were placed for hemostasis.  The graft was decorticated.  A Steffee probe was inserted at the intersection of the anatomic landmarks.  A flexible probe was inserted to check the integrity of the pedicle.  Screws were placed at L4 and L5 bilaterally. contoured rods were later added, nuts were tightened and torqued.  Biplanar image intensification showed appropriate screw position and anatomic level and satisfactory posture.  I performed a laminectomy for decompression at L4-5.   The interspinous ligament was excised.  The upper lamina was removed along its caudal two thirds aspect out to within 8-10 mm of the pars intra-articularis.  A small portion of the cephalad aspect of the caudal lamina was excised with a Kerrison rongeur.  Medial facetectomies were performed bilaterally using Kerrison rongeur and osteotomes.  A high-speed katya was used as well.  Foraminotomies were accomplished with a foraminotomy rongeur.  A small tear along the dorsal surface of the right L5 nerve root was sustained.  This was too far lateral to repair but was tiny 1 mm and throughout the procedure I saw drop of CSF if that.  This was later  covered with layers of Tisseel, fat graft and more Tisseel and tested with Valsalva maneuver and there was no evidence of CSF leakage.  Discectomy was performed bilaterally before the CSF leak repair and I removed a large amount of broad-based herniated disc more right-sided than left but good amount on either side such that at the end the procedure I could retract the nerve roots medially the dural sac was mobile and it was no undue compression.  There was some recess stenosis which was completely relieved with medial facetectomies...  Upon completion of the decompression I could pass a ball probe through the affected foramina, and easily retract  the nerve roots  toward the midline.  Bleeding was controlled with bipolar cautery,and Gelfoam with thrombin and/ or FloSeal hemostatic matrix..   Bone marrow was obtained from the right iliac crest.  The marrow was applied to the Pliafix sponges.  Laminectomy bone, and bone from decortication of the graft bed was cleaned at the back table throughout the case and available for bone graft.  The morcellized bone was placed in the decorticated lateral gutter bilaterally.  Pliafix sponges were then placed.  Hemostasis was assured.  The wound was then closed with running and interrupted #1 Vicryl for the dorsal fascia, 2-0 Vicryl subcutaneously, then 4-0 Monocryl and Dermabond for the skin.  A sterile dressing was applied.  The patient is about to be recovered.

## 2020-07-02 NOTE — PROGRESS NOTES
· Left ventricular systolic function is normal. Calculated EF = 62.7%. Estimated EF was in agreement with the calculated EF. Estimated EF = 63%. Normal left ventricular cavity size and wall thickness noted. All left ventricular wall segments contract normally. Left ventricular diastolic function is normal.  · Mild MAC is present. Trace mitral valve regurgitation is present.  · Trace tricuspid valve regurgitation is present. Estimated right ventricular systolic pressure from tricuspid regurgitation is normal (<35 mmHg). Calculated right ventricular systolic pressure from tricuspid regurgitation is 25 mmHg.    Echocardiogram reviewed.  Normal LV function.  No significant valvular heart disease.    Low risk for cardiovascular event with surgery.  I recommend proceeding without further testing.    Abnormal EKG with a normal echocardiogram.    No further cardiac evaluation.  Please call if he has any change in his symptoms.

## 2020-07-02 NOTE — PLAN OF CARE
Problem: Patient Care Overview  Goal: Plan of Care Review  Outcome: Ongoing (interventions implemented as appropriate)  Flowsheets  Taken 7/2/2020 1925 by Sophia Mitchell, RN  Progress: no change  Outcome Summary: VSS; pt was NPO for back surgery; pain medication given; waiting for report from recovery; continue to monitor  Taken 7/2/2020 1337 by Samanta Asher RN  Plan of Care Reviewed With: patient

## 2020-07-02 NOTE — ANESTHESIA PREPROCEDURE EVALUATION
Anesthesia Evaluation     Patient summary reviewed and Nursing notes reviewed                Airway   Mallampati: II  Dental      Pulmonary    (+) a smoker Former, shortness of breath,   Cardiovascular - negative cardio ROS    ECG reviewed        Neuro/Psych- negative ROS  GI/Hepatic/Renal/Endo - negative ROS     Musculoskeletal (-) negative ROS    Abdominal    Substance History - negative use     OB/GYN negative ob/gyn ROS         Other                        Anesthesia Plan    ASA 3     general   (Admitted for multiple problems including lung studies, covid concerns, and other possible infections along with significan back pain now for decompression and fusion)  intravenous induction     Anesthetic plan, all risks, benefits, and alternatives have been provided, discussed and informed consent has been obtained with: patient.

## 2020-07-03 LAB
ANION GAP SERPL CALCULATED.3IONS-SCNC: 7.5 MMOL/L (ref 5–15)
BUN SERPL-MCNC: 22 MG/DL (ref 6–20)
BUN/CREAT SERPL: 17.2 (ref 7–25)
CA-I BLD-MCNC: 6.4 MG/DL (ref 4.6–5.4)
CA-I SERPL ISE-MCNC: 1.59 MMOL/L (ref 1.15–1.35)
CALCIUM SPEC-SCNC: 10.4 MG/DL (ref 8.6–10.5)
CHLORIDE SERPL-SCNC: 101 MMOL/L (ref 98–107)
CO2 SERPL-SCNC: 29.5 MMOL/L (ref 22–29)
CREAT SERPL-MCNC: 1.28 MG/DL (ref 0.76–1.27)
GFR SERPL CREATININE-BSD FRML MDRD: 72 ML/MIN/1.73
GLUCOSE SERPL-MCNC: 109 MG/DL (ref 65–99)
HCT VFR BLD AUTO: 38.5 % (ref 37.5–51)
HGB BLD-MCNC: 12.6 G/DL (ref 13–17.7)
POTASSIUM SERPL-SCNC: 4.1 MMOL/L (ref 3.5–5.2)
SODIUM SERPL-SCNC: 138 MMOL/L (ref 136–145)

## 2020-07-03 PROCEDURE — 97110 THERAPEUTIC EXERCISES: CPT

## 2020-07-03 PROCEDURE — 63710000001 PREDNISONE PER 1 MG: Performed by: INTERNAL MEDICINE

## 2020-07-03 PROCEDURE — 85014 HEMATOCRIT: CPT | Performed by: ORTHOPAEDIC SURGERY

## 2020-07-03 PROCEDURE — 87385 HISTOPLASMA CAPSUL AG IA: CPT | Performed by: INTERNAL MEDICINE

## 2020-07-03 PROCEDURE — 85018 HEMOGLOBIN: CPT | Performed by: ORTHOPAEDIC SURGERY

## 2020-07-03 PROCEDURE — 94799 UNLISTED PULMONARY SVC/PX: CPT

## 2020-07-03 PROCEDURE — 36415 COLL VENOUS BLD VENIPUNCTURE: CPT | Performed by: ORTHOPAEDIC SURGERY

## 2020-07-03 PROCEDURE — 99024 POSTOP FOLLOW-UP VISIT: CPT | Performed by: ORTHOPAEDIC SURGERY

## 2020-07-03 PROCEDURE — 82330 ASSAY OF CALCIUM: CPT | Performed by: ORTHOPAEDIC SURGERY

## 2020-07-03 PROCEDURE — 80048 BASIC METABOLIC PNL TOTAL CA: CPT | Performed by: ORTHOPAEDIC SURGERY

## 2020-07-03 PROCEDURE — 25010000003 CEFAZOLIN IN DEXTROSE 2-4 GM/100ML-% SOLUTION: Performed by: ORTHOPAEDIC SURGERY

## 2020-07-03 PROCEDURE — 86635 COCCIDIOIDES ANTIBODY: CPT | Performed by: INTERNAL MEDICINE

## 2020-07-03 PROCEDURE — 97161 PT EVAL LOW COMPLEX 20 MIN: CPT

## 2020-07-03 PROCEDURE — 86481 TB AG RESPONSE T-CELL SUSP: CPT | Performed by: INTERNAL MEDICINE

## 2020-07-03 RX ORDER — OXYCODONE HYDROCHLORIDE AND ACETAMINOPHEN 5; 325 MG/1; MG/1
TABLET ORAL
Qty: 45 TABLET | Refills: 0 | Status: SHIPPED | OUTPATIENT
Start: 2020-07-03 | End: 2020-07-04

## 2020-07-03 RX ADMIN — OXYCODONE HYDROCHLORIDE AND ACETAMINOPHEN 2 TABLET: 5; 325 TABLET ORAL at 20:36

## 2020-07-03 RX ADMIN — SODIUM CHLORIDE, PRESERVATIVE FREE 3 ML: 5 INJECTION INTRAVENOUS at 08:36

## 2020-07-03 RX ADMIN — SODIUM CHLORIDE, PRESERVATIVE FREE 10 ML: 5 INJECTION INTRAVENOUS at 08:36

## 2020-07-03 RX ADMIN — CEFAZOLIN SODIUM 2 G: 2 INJECTION, SOLUTION INTRAVENOUS at 08:34

## 2020-07-03 RX ADMIN — OXYCODONE HYDROCHLORIDE AND ACETAMINOPHEN 2 TABLET: 5; 325 TABLET ORAL at 04:03

## 2020-07-03 RX ADMIN — CYCLOBENZAPRINE 10 MG: 10 TABLET, FILM COATED ORAL at 08:35

## 2020-07-03 RX ADMIN — OXYCODONE HYDROCHLORIDE AND ACETAMINOPHEN 2 TABLET: 5; 325 TABLET ORAL at 08:35

## 2020-07-03 RX ADMIN — OXYCODONE HYDROCHLORIDE AND ACETAMINOPHEN 2 TABLET: 5; 325 TABLET ORAL at 12:37

## 2020-07-03 RX ADMIN — SODIUM CHLORIDE 100 ML/HR: 9 INJECTION, SOLUTION INTRAVENOUS at 08:34

## 2020-07-03 RX ADMIN — PREDNISONE 40 MG: 20 TABLET ORAL at 08:34

## 2020-07-03 RX ADMIN — OXYCODONE HYDROCHLORIDE AND ACETAMINOPHEN 2 TABLET: 5; 325 TABLET ORAL at 16:23

## 2020-07-03 RX ADMIN — SODIUM CHLORIDE 100 ML/HR: 9 INJECTION, SOLUTION INTRAVENOUS at 21:46

## 2020-07-03 RX ADMIN — SODIUM CHLORIDE, PRESERVATIVE FREE 10 ML: 5 INJECTION INTRAVENOUS at 20:36

## 2020-07-03 RX ADMIN — CYCLOBENZAPRINE 10 MG: 10 TABLET, FILM COATED ORAL at 16:23

## 2020-07-03 NOTE — DISCHARGE INSTRUCTIONS
JESU DELGADO JR., M.D.  ORTHOPAEDIC SURGERY  [address]  [address]    LUMBAR FUSION SURGERY  HOME INSTRUCTIONS    1.     You will need to check your dressing or have a family member to check           your dressing EVERYDAY for the following signs:             Increase in redness           Increase in swelling around the incision or low back area           Increase in pain           Any drainage noted on the dressing or from the incision           Edges of the incision are pulling apart once dressing is removed           Increase in overall body temperature (greater than 100.5 degrees)           Dressings are to be changed only if directed by the physician            Call your physician if any of these listed above occur after you are           discharged from the hospital.  DO NOT wait until your office visit to           inform the physician.    2.     Continue with the exercise program twice a day as instructed by the           physical therapist at the hospital.  A more involved physical therapy          program may be started 3 months after your surgery.    3.     Walking must be done on a daily basis.  You should walk at least twice           a day and more if you are able.  Start with short distances and gradually           add a little distance everyday.    4.     You may wish to use an elevated toilet seat for up to12 weeks after surgery.    5.     You MUST wear your brace anytime you are up.  You do not have to wear             your brace at nighttime when walking a short distance to the bathroom.    6.     You may shower three (3) days after your surgery, as long as there is no                drainage.  The dressing is waterproof and should be left on and intact until you are         seen in the office for your first post-operative visit. You may remove          your brace to shower.                                                                                                             7.     You can sit in a high-straight back chair with arms as tolerated, unless  instructed otherwise.  Do not flex your hips more than 90 degrees when        sitting.  Avoid low, soft chairs.    8.     You may want to sleep on a firm mattress.  Avoid waterbeds for 3-6 months           after surgery.     9.     Patients should not smoke.  Smoking interferes with the fusion and the                healing time.     10.   Avoid pushing or pulling.  There will be no lifting of anything over 5 pounds for the           first few months.    11.   You may drive a car, usually 2-4 weeks after surgery, but only after you           have checked with your physician.    12.   You may ride in a car after your surgery and for no more than           30 minutes to one hour at a time.  Short distance riding to and from the           physician’s office for follow-up visits is the only time you should be in a           car until after the 2-4 weeks time frame.    13.   You may go home in a car.  You must wear your seat belt.    14.   You will be sent home with pain medication, but it will only be used up to           4 weeks after surgery.  Refills may be obtained, but ONLY during office           hours.    15.   You may go and down stairs, but take it easy at first.  It is preferable that   the first few weeks at home, try to only use the stairs once or twice a day          until you have had a chance to regain your strength.    16.   NO bending at the waist.  You may need to use your “reacher” to assist you          in picking up items off of the floor. (not mandatory to have)  If you absolutely                 something, you may squat by bending at your hips and knees.          need to reach for.    17.   Sexual activity should be avoided for 2 weeks after surgery.  Be careful and use              common sense.  You may have to adjust your position to lessen the strain on your         back.    18.   Return to the  office in 2 weeks to see Dr. Bustillo.
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
## 2020-07-03 NOTE — PLAN OF CARE
Problem: Patient Care Overview  Goal: Plan of Care Review  Flowsheets (Taken 7/3/2020 1110)  Plan of Care Reviewed With: patient  Outcome Summary: Pt. is a 50 year old Male who is s/p L4-L5 Laminectomy and fusion.  Pt. reports that prior to admission he was independent with functional mobility. Pt. currently presents with decreased strength, decreased balance, and decreased tolerance to functional activity.  Pt. will benefit from skilled inpt. P.T. to address his functional deficits and to assist pt. in regaining his maximum level of independence with functional mobility.   Patient was wearing a face mask during this therapy encounter. Therapist used appropriate personal protective equipment including mask and gloves.  Mask used was standard procedure mask. Appropriate PPE was worn during the entire therapy session. Hand hygiene was completed before and after therapy session. Patient is not in enhanced droplet precautions.

## 2020-07-03 NOTE — PROGRESS NOTES
I am covering for Dr. Natalio Bustillo  today.  The patient is sitting up in bed.  No adverse reports from the nursing.  He was planning to get discharged home but discharge is on hold secondary to some prescription conflicts.

## 2020-07-03 NOTE — PROGRESS NOTES
Continued Stay Note  [facility]     Patient Name: Bonifacio Lewsi  MRN: [id number]  Today's Date: 7/3/2020    Admit Date: 6/26/2020    Discharge Plan     Row Name 07/03/20 1516       Plan    Plan  Home    Plan Comments  Met with patient and plan is still to go home at discharge.  Patient states he received a call from his [facility] pharmacy stating they couldn't fill the percocet prescription as written.  Called pharmacist at [facility] and was told that they can only dispense 6 Percocet per day for up to 7 days.  Left a message for MD to write prescription for Percocet 1 every 4 hrs for up to 7 days.  Informed patient and nurse who will be here tomorrow also.        Discharge Codes    No documentation.             Margie Jesus RN

## 2020-07-03 NOTE — PROGRESS NOTES
Dr. ALEXYS Paniagua    [address]    7/3/2020    Patient ID:  Name:  Bonifacio Lewis  MRN:  [id number]  1970  50 y.o.  male            CC/Reason for visit: Sarcoidosis, hypercalcemia, status post lumbar laminectomy    Interval hx: Patient complaining of lower back pain after surgery.  Unhappy with decision of discharge today by orthopedist.  Patient refuses to leave the hospital until Sunday.  Having some discrepancies with narcotic prescriptions from pharmacy and there was written by orthopedist.  Plan is to remain in the hospital till tomorrow, when Dr. Bustillo can round on the patient.  Otherwise denies cough, hemoptysis or fever.    ROS: No abdominal pain, no blurry vision    Vitals:  Vitals:    07/03/20 0450 07/03/20 0908 07/03/20 1312 07/03/20 1709   BP: 118/76 120/72 127/80 136/88   BP Location: Left arm Left arm Left arm Left arm   Patient Position: Lying Lying Sitting Sitting   Pulse: 101 93 82 92   Resp: 17 16 16 16   Temp: 99.4 °F (37.4 °C) 98.6 °F (37 °C) 98.4 °F (36.9 °C) 99.1 °F (37.3 °C)   TempSrc: Oral Oral Oral Oral   SpO2: 96% 98% 95% 94%   Weight:       Height:               Body mass index is 21.58 kg/m².    Intake/Output Summary (Last 24 hours) at 7/3/2020 1737  Last data filed at 7/3/2020 1130  Gross per 24 hour   Intake 1050 ml   Output 1375 ml   Net -325 ml       Exam:  GEN:  No distress  Alert, oriented x 3.   LUNGS: Clear breath sounds bilat, no use of accessory muscles  CV:  Normal S1S2, without murmur, no edema  ABD:  Non tender, no enlarged liver or masses      Scheduled meds:    docusate sodium 100 mg Oral BID   polyethylene glycol 17 g Oral Daily   polyethylene glycol 17 g Oral Daily   predniSONE 40 mg Oral Daily With Breakfast   sodium chloride 10 mL Intravenous Q12H   sodium chloride 3 mL Intravenous Q12H     IV meds:                        HYDROmorphone HCl-NaCl     lactated ringers 9 mL/hr Last Rate: 9 mL/hr (07/02/20 1409)   sodium chloride 30 mL/hr Last Rate:  30 mL/hr (06/30/20 1244)   sodium chloride 100 mL/hr Last Rate: 100 mL/hr (07/03/20 0834)       Data Review:   I reviewed the patient's medications and new clinical results.    COVID19   Date Value Ref Range Status   06/26/2020 Not Detected Not Detected - Ref. Range Final         Lab Results   Component Value Date    CALCIUM 10.4 07/03/2020    PHOS 7.2 (H) 07/02/2020    MG 1.6 07/02/2020    MG 1.3 (L) 07/01/2020    MG 1.2 (L) 06/30/2020     Results from last 7 days   Lab Units 07/03/20  0837 07/03/20  0611 07/02/20  2149 07/02/20  0419 07/01/20  1912 07/01/20  0505 06/30/20  0621   SODIUM mmol/L 138  --   --  137  --  136 138   POTASSIUM mmol/L 4.1  --   --  3.9 4.1 3.5 3.7   CHLORIDE mmol/L 101  --   --  104  --  99 99   CO2 mmol/L 29.5*  --   --  29.1*  --  29.0 29.6*   BUN mg/dL 22*  --   --  22*  --  22* 25*   CREATININE mg/dL 1.28*  --   --  1.02  --  1.08 1.17   CALCIUM mg/dL 10.4  --   --  10.6*  --  10.5 10.8*   GLUCOSE mg/dL 109*  --   --  102*  --  132* 136*   WBC 10*3/mm3  --   --   --   --   --   --  10.84*   HEMOGLOBIN g/dL  --  12.6* 13.2  --   --   --  12.1*   PLATELETS 10*3/mm3  --   --   --   --   --   --  309         ASSESSMENT:   Hypercalcemia  Interstitial lung disease  Lung nodules  Mediastinal adenopathy  Chronic low back pain  Weight loss  Acute kidney injury on chronic kidney disease  Ongoing tobacco abuse  Status post lumbar laminectomy    PLAN:  Bronchoscopic biopsies show noncaseating granulomas.  There was focally also presence of some polarizable foreign material, this patient could be aspirating food and this could be recurrent aspiration pneumonitis rather than sarcoidosis.  I will order esophagram to see if he has normal swallowing.  Abnormal esophageal function in the setting of hypercalcemia could have caused silent nocturnal reflux.  In the presence of hypercalcemia this could still be sarcoidosis.  Plan for now is is to continue steroids.  Due to recent lumbar surgery would  recommend discharge on medium dose prednisone rather than high-dose prednisone, 30 mg daily and follow-up with me in the office within 3 to 4 weeks.    Hypercalcemia has responded very well to treatment with diuretics and intravenous steroids.  Appreciate input from nephrology.  Discharge this weekend if patient and orthopedist agree on day of discharge and prescription for narcotics which will be written by orthopedist.          Олег Paniagua MD  7/3/2020

## 2020-07-03 NOTE — ANESTHESIA POSTPROCEDURE EVALUATION
Patient: Bonifacio Lewis    Procedure Summary     Date:  07/02/20 Room / Location:  [facility] OR  / [facility] MAIN OR    Anesthesia Start:  1614 Anesthesia Stop:  1852    Procedure:  Lumbar 4- Lumbar 5 Laminectomy and Fusion with Instrumentation (N/A Spine Lumbar) Diagnosis:      Surgeon:  Natalio Bustillo MD Provider:  Clint Beth MD    Anesthesia Type:  general ASA Status:  3          Anesthesia Type: general    Vitals  Vitals Value Taken Time   /91 7/2/2020  9:00 PM   Temp 36.6 °C (97.9 °F) 7/2/2020  8:45 PM   Pulse 70 7/2/2020  9:08 PM   Resp 16 7/2/2020  8:45 PM   SpO2 99 % 7/2/2020  9:18 PM   Vitals shown include unvalidated device data.        Post Anesthesia Care and Evaluation      Comments: Pt discharged while I was in a case, uncertain if seen by anyone else  No anesthesia care post op

## 2020-07-03 NOTE — PROGRESS NOTES
I did not see him today, but received word from his nurse that he is doing very well.  hgb 12.6.  I plan on rounding in the am and seeing him then, but if he wants to go home after PT today that is fine with me.  I left instructions on the chart, and submitted rx for percoset.

## 2020-07-04 ENCOUNTER — APPOINTMENT (OUTPATIENT)
Dept: GENERAL RADIOLOGY | Facility: HOSPITAL | Age: 50
End: 2020-07-04

## 2020-07-04 LAB
ANION GAP SERPL CALCULATED.3IONS-SCNC: 4.7 MMOL/L (ref 5–15)
BUN SERPL-MCNC: 16 MG/DL (ref 6–20)
BUN/CREAT SERPL: 14.3 (ref 7–25)
CA-I BLD-MCNC: 6.3 MG/DL (ref 4.6–5.4)
CA-I SERPL ISE-MCNC: 1.58 MMOL/L (ref 1.15–1.35)
CALCIUM SPEC-SCNC: 10.5 MG/DL (ref 8.6–10.5)
CHLORIDE SERPL-SCNC: 100 MMOL/L (ref 98–107)
CO2 SERPL-SCNC: 30.3 MMOL/L (ref 22–29)
CREAT SERPL-MCNC: 1.12 MG/DL (ref 0.76–1.27)
GFR SERPL CREATININE-BSD FRML MDRD: 84 ML/MIN/1.73
GLUCOSE SERPL-MCNC: 82 MG/DL (ref 65–99)
HCT VFR BLD AUTO: 34.4 % (ref 37.5–51)
HGB BLD-MCNC: 11.9 G/DL (ref 13–17.7)
POTASSIUM SERPL-SCNC: 3.6 MMOL/L (ref 3.5–5.2)
PTH RELATED PROT SERPL-SCNC: <2 PMOL/L
SODIUM SERPL-SCNC: 135 MMOL/L (ref 136–145)

## 2020-07-04 PROCEDURE — 86635 COCCIDIOIDES ANTIBODY: CPT | Performed by: INTERNAL MEDICINE

## 2020-07-04 PROCEDURE — 85014 HEMATOCRIT: CPT | Performed by: INTERNAL MEDICINE

## 2020-07-04 PROCEDURE — 80048 BASIC METABOLIC PNL TOTAL CA: CPT | Performed by: INTERNAL MEDICINE

## 2020-07-04 PROCEDURE — 94799 UNLISTED PULMONARY SVC/PX: CPT

## 2020-07-04 PROCEDURE — 74220 X-RAY XM ESOPHAGUS 1CNTRST: CPT

## 2020-07-04 PROCEDURE — 86612 BLASTOMYCES ANTIBODY: CPT | Performed by: INTERNAL MEDICINE

## 2020-07-04 PROCEDURE — 85018 HEMOGLOBIN: CPT | Performed by: INTERNAL MEDICINE

## 2020-07-04 PROCEDURE — 82330 ASSAY OF CALCIUM: CPT | Performed by: INTERNAL MEDICINE

## 2020-07-04 PROCEDURE — 36415 COLL VENOUS BLD VENIPUNCTURE: CPT | Performed by: INTERNAL MEDICINE

## 2020-07-04 PROCEDURE — 63710000001 PREDNISONE PER 5 MG: Performed by: INTERNAL MEDICINE

## 2020-07-04 PROCEDURE — 99024 POSTOP FOLLOW-UP VISIT: CPT | Performed by: ORTHOPAEDIC SURGERY

## 2020-07-04 PROCEDURE — 63710000001 PREDNISONE PER 1 MG: Performed by: INTERNAL MEDICINE

## 2020-07-04 RX ORDER — PREDNISONE 20 MG/1
30 TABLET ORAL
Qty: 45 TABLET | Refills: 1 | Status: SHIPPED | OUTPATIENT
Start: 2020-07-05 | End: 2020-08-06 | Stop reason: HOSPADM

## 2020-07-04 RX ORDER — OXYCODONE HYDROCHLORIDE AND ACETAMINOPHEN 5; 325 MG/1; MG/1
TABLET ORAL
Qty: 42 TABLET | Refills: 0 | Status: SHIPPED | OUTPATIENT
Start: 2020-07-04 | End: 2020-07-10 | Stop reason: SDUPTHER

## 2020-07-04 RX ORDER — PSEUDOEPHEDRINE HCL 30 MG
100 TABLET ORAL 2 TIMES DAILY
Qty: 30 EACH | Refills: 0 | Status: SHIPPED | OUTPATIENT
Start: 2020-07-04 | End: 2020-09-29

## 2020-07-04 RX ADMIN — DOCUSATE SODIUM 100 MG: 100 CAPSULE ORAL at 09:41

## 2020-07-04 RX ADMIN — OXYCODONE HYDROCHLORIDE AND ACETAMINOPHEN 2 TABLET: 5; 325 TABLET ORAL at 11:42

## 2020-07-04 RX ADMIN — BARIUM SULFATE 183 ML: 960 POWDER, FOR SUSPENSION ORAL at 14:08

## 2020-07-04 RX ADMIN — SODIUM CHLORIDE, PRESERVATIVE FREE 3 ML: 5 INJECTION INTRAVENOUS at 09:41

## 2020-07-04 RX ADMIN — OXYCODONE HYDROCHLORIDE AND ACETAMINOPHEN 2 TABLET: 5; 325 TABLET ORAL at 06:41

## 2020-07-04 RX ADMIN — POLYETHYLENE GLYCOL 3350 17 G: 17 POWDER, FOR SOLUTION ORAL at 09:41

## 2020-07-04 RX ADMIN — OXYCODONE HYDROCHLORIDE AND ACETAMINOPHEN 2 TABLET: 5; 325 TABLET ORAL at 17:10

## 2020-07-04 RX ADMIN — CYCLOBENZAPRINE 10 MG: 10 TABLET, FILM COATED ORAL at 21:22

## 2020-07-04 RX ADMIN — PREDNISONE 30 MG: 20 TABLET ORAL at 09:40

## 2020-07-04 RX ADMIN — CYCLOBENZAPRINE 10 MG: 10 TABLET, FILM COATED ORAL at 01:10

## 2020-07-04 RX ADMIN — OXYCODONE HYDROCHLORIDE AND ACETAMINOPHEN 2 TABLET: 5; 325 TABLET ORAL at 01:10

## 2020-07-04 RX ADMIN — SODIUM CHLORIDE, PRESERVATIVE FREE 10 ML: 5 INJECTION INTRAVENOUS at 09:41

## 2020-07-04 RX ADMIN — OXYCODONE HYDROCHLORIDE AND ACETAMINOPHEN 2 TABLET: 5; 325 TABLET ORAL at 21:22

## 2020-07-04 RX ADMIN — CYCLOBENZAPRINE 10 MG: 10 TABLET, FILM COATED ORAL at 11:42

## 2020-07-04 RX ADMIN — BISACODYL 5 MG: 5 TABLET ORAL at 09:41

## 2020-07-04 NOTE — PROGRESS NOTES
Postop day 2: L4-5 laminectomy and fusion with instrumentation.  Still some numbness about the feet but he moves the feet well.  There was some issue about the prescription and his pharmacy only filling 1 pill every 4 hours.  Normally I would give him 1-2 every 4 hours and that was what his prescription said.  If he wants to go to pharmacy it is going to dictate his doctors orders then I will be happy to change the order to reflect this.  He can be discharged home at any time from my standpoint and will continue to walk for exercise.  A Mepilex dressing will be placed before discharge and this may be left in place as he showers.  I will see him in the office in 2 weeks.

## 2020-07-04 NOTE — PLAN OF CARE
Problem: Patient Care Overview  Goal: Plan of Care Review  Outcome: Ongoing (interventions implemented as appropriate)  Flowsheets (Taken 7/4/2020 3423)  Progress: no change  Plan of Care Reviewed With: patient  Outcome Summary: Prn percocet and flexeril given for pain. PCA pump in use. Uses urinal at bedside, forgets to call out before getting up. chair alarm on.
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
## 2020-07-04 NOTE — NURSING NOTE
[place] alarms going off in pts room, found pt sitting on the floor by his bed. Stated that he fell out of bed because his SCD cords got caught at the foot of the bed when he was trying to use the urinal. Pt was alone at the time of the fall. Pt did not call out for assistance before trying to get up. Call light was within reach. No injuries noted. Vitals stable. MD and family notified. Falls education given.

## 2020-07-05 VITALS
TEMPERATURE: 99.1 F | WEIGHT: 182 LBS | SYSTOLIC BLOOD PRESSURE: 127 MMHG | HEIGHT: 77 IN | HEART RATE: 82 BPM | DIASTOLIC BLOOD PRESSURE: 78 MMHG | OXYGEN SATURATION: 99 % | RESPIRATION RATE: 15 BRPM | BODY MASS INDEX: 21.49 KG/M2

## 2020-07-05 LAB
ANION GAP SERPL CALCULATED.3IONS-SCNC: 7.1 MMOL/L (ref 5–15)
BUN SERPL-MCNC: 17 MG/DL (ref 6–20)
BUN/CREAT SERPL: 17 (ref 7–25)
CA-I BLD-MCNC: 6.2 MG/DL (ref 4.6–5.4)
CA-I SERPL ISE-MCNC: 1.54 MMOL/L (ref 1.15–1.35)
CALCIUM SPEC-SCNC: 10.1 MG/DL (ref 8.6–10.5)
CHLORIDE SERPL-SCNC: 101 MMOL/L (ref 98–107)
CO2 SERPL-SCNC: 27.9 MMOL/L (ref 22–29)
CREAT SERPL-MCNC: 1 MG/DL (ref 0.76–1.27)
GFR SERPL CREATININE-BSD FRML MDRD: 96 ML/MIN/1.73
GLUCOSE SERPL-MCNC: 104 MG/DL (ref 65–99)
POTASSIUM SERPL-SCNC: 3.5 MMOL/L (ref 3.5–5.2)
SODIUM SERPL-SCNC: 136 MMOL/L (ref 136–145)
TSPOT INTERPRETATION: NEGATIVE
TSPOT NIL CONTROL INTERPRETATION: NORMAL
TSPOT PANEL A: 0
TSPOT PANEL B: 1
TSPOT POS CONTROL INTERPRETATION: NORMAL

## 2020-07-05 PROCEDURE — 80048 BASIC METABOLIC PNL TOTAL CA: CPT | Performed by: INTERNAL MEDICINE

## 2020-07-05 PROCEDURE — 63710000001 PREDNISONE PER 5 MG: Performed by: INTERNAL MEDICINE

## 2020-07-05 PROCEDURE — 63710000001 PREDNISONE PER 1 MG: Performed by: INTERNAL MEDICINE

## 2020-07-05 PROCEDURE — 94799 UNLISTED PULMONARY SVC/PX: CPT

## 2020-07-05 PROCEDURE — 82330 ASSAY OF CALCIUM: CPT | Performed by: INTERNAL MEDICINE

## 2020-07-05 RX ADMIN — OXYCODONE HYDROCHLORIDE AND ACETAMINOPHEN 2 TABLET: 5; 325 TABLET ORAL at 09:13

## 2020-07-05 RX ADMIN — SODIUM CHLORIDE, PRESERVATIVE FREE 3 ML: 5 INJECTION INTRAVENOUS at 09:14

## 2020-07-05 RX ADMIN — SODIUM CHLORIDE, PRESERVATIVE FREE 10 ML: 5 INJECTION INTRAVENOUS at 09:14

## 2020-07-05 RX ADMIN — OXYCODONE HYDROCHLORIDE AND ACETAMINOPHEN 2 TABLET: 5; 325 TABLET ORAL at 04:11

## 2020-07-05 RX ADMIN — PREDNISONE 30 MG: 20 TABLET ORAL at 09:13

## 2020-07-05 NOTE — PROGRESS NOTES
Jacky Cash MD                          [phone]      Patient ID:    Name:  Bonifacio Lewis    MRN:  [id number]    1970   50 y.o.  male            Patient Care Team:  Provider, No Known as PCP - General    CC/ Reason for visit: Hypercalcemia, abnormal CAT scan, suspicion for sarcoidosis.    Subjective: Pt seen and examined this AM. No acute overnight events noted. Doing better.  Complains of some lower extremity numbness which is stable.  Does have some postoperative pain but otherwise feeling better.  Calcium is improved.  Nephrology signed off    ROS: Denies any subjective fevers, syncope or presyncopal events, new neurological deficits, nausea or vomiting currently    Objective     Vital Signs past 24hrs    BP range: BP: (127-135)/(78-91) 127/78  Pulse range: Heart Rate:  [82-92] 82  Resp rate range: Resp:  [15-16] 15  Temp range: Temp (24hrs), Av.4 °F (36.9 °C), Min:97.6 °F (36.4 °C), Max:99.1 °F (37.3 °C)      Ventilator/Non-Invasive Ventilation Settings (From admission, onward)    None          Device (Oxygen Therapy): partial rebreather mask       82.6 kg (182 lb); Body mass index is 21.58 kg/m².      Intake/Output Summary (Last 24 hours) at 2020 1421  Last data filed at 2020 0415  Gross per 24 hour   Intake --   Output 1100 ml   Net -1100 ml       PHYSICAL EXAM   Constitutional: Middle aged pt in bed, No acute respiratory distress, no accessory muscle use  Head: - NCAT  Eyes: No pallor.  Anicteric sclerae, EOMI.  ENMT:  Mallampati 3, no oral thrush. Dry MM.   NECK: Trachea midline, No thyromegaly, no palpable cervical lymphadenopathy  Heart: RRR, no murmur. No pedal edema   Lungs: LUCILLE +, No wheezes/ crackles heard    Abdomen: Soft. No tenderness, guarding or rigidity. No palpable masses  Extremities: Extremities warm and well perfused. No cyanosis/ clubbing.  Postop changes in the back  Neuro: Conscious, answers appropriately, no gross focal neuro  deficits  Psych: Mood and affect appropriate    Scheduled meds:    docusate sodium 100 mg Oral BID   polyethylene glycol 17 g Oral Daily   polyethylene glycol 17 g Oral Daily   predniSONE 30 mg Oral Daily With Breakfast   sodium chloride 10 mL Intravenous Q12H   sodium chloride 3 mL Intravenous Q12H       IV meds:                        sodium chloride 30 mL/hr Last Rate: 30 mL/hr (06/30/20 1244)       Data Review:      Results from last 7 days   Lab Units 07/05/20  0526 07/04/20  0513 07/03/20  0837 07/03/20  0611 07/02/20  2149  06/30/20  0621   SODIUM mmol/L 136 135* 138  --   --    < > 138   POTASSIUM mmol/L 3.5 3.6 4.1  --   --    < > 3.7   CHLORIDE mmol/L 101 100 101  --   --    < > 99   CO2 mmol/L 27.9 30.3* 29.5*  --   --    < > 29.6*   BUN mg/dL 17 16 22*  --   --    < > 25*   CREATININE mg/dL 1.00 1.12 1.28*  --   --    < > 1.17   CALCIUM mg/dL 10.1 10.5 10.4  --   --    < > 10.8*   GLUCOSE mg/dL 104* 82 109*  --   --    < > 136*   WBC 10*3/mm3  --   --   --   --   --   --  10.84*   HEMOGLOBIN g/dL  --  11.9*  --  12.6* 13.2  --  12.1*   PLATELETS 10*3/mm3  --   --   --   --   --   --  309    < > = values in this interval not displayed.       Lab Results   Component Value Date    CALCIUM 10.1 07/05/2020    PHOS 7.2 (H) 07/02/2020                    Results Review:    I have reviewed the relevant laboratory results and independently reviewed the chest imaging from this hospitalization including the available echocardiogram reports personally and summarized it if/ when appropriate below    Assessment    Acute hypercalcemia  Interstitial lung disease s/p bronch biopsy - Non caseating granulomas   Lung nodules - needs o/p f/u   Hilar/ Mediastinal lymphadenopathy s/p EBUS  JACKI on CKD stage III  Chronic back pain s/p lumbar laminectomy 7/2  Tobacco abuse    PLAN:  All problems are new to me.  Work-up done so far noted and bronchoscopic results from earlier in this hospitalization reviewed.  Noted noncaseating  granulomas in the tissue biopsy.  No malignancy noted.  Plan is for steroids per Dr. Paniagua and outpatient follow-up in a few weeks.  Hypercalcemia has improved and nephrology has signed off.  No outpatient follow-up recommendations made.  Noted that he is doing better postoperatively after back surgery.  Does complain of some lower extremity numbness and has discussed about this with his orthopedic physician who would like to follow him in the clinic and reassess.  Pain control with PRN medications and prescriptions have been given by the surgeons.  He would like to stay overnight and leave in the morning as he has some issues with getting transportation.    I have discussed my findings and recommendations with patient and nursing staff.     Jacky Cash MD  7/5/2020

## 2020-07-05 NOTE — PLAN OF CARE
Problem: Patient Care Overview  Goal: Plan of Care Review  Outcome: Ongoing (interventions implemented as appropriate)  Flowsheets (Taken 7/5/2020 1364)  Progress: no change  Outcome Summary: pain well controlled with prns, plan is to DC home today. Will ctm.

## 2020-07-06 ENCOUNTER — READMISSION MANAGEMENT (OUTPATIENT)
Dept: CALL CENTER | Facility: HOSPITAL | Age: 50
End: 2020-07-06

## 2020-07-06 LAB
H CAPSUL AG SER IA-MCNC: <0.5 NG/ML
Lab: NORMAL

## 2020-07-06 NOTE — OUTREACH NOTE
Prep Survey      Responses   [facility] patient discharged from?  [facility]   Is LACE score < 7 ?  No   Eligibility  Readm Mgmt   Discharge diagnosis  Lumbar 4- Lumbar 5 Laminectomy and Fusion with Instrumentation, mediastinal/hilar adenopathy and extensive interstitial changes c/w sarcoidosis.    COVID-19 Test Status  Negative   Does the patient have one of the following disease processes/diagnoses(primary or secondary)?  General Surgery   Does the patient have Home health ordered?  No   Is there a DME ordered?  No   General alerts for this patient  Needs a PCP    Prep survey completed?  Yes          Nia Hammond RN

## 2020-07-06 NOTE — PROGRESS NOTES
Case Management Discharge Note      Final Note: Home    Provided Post Acute Provider List?: N/A    Destination      No service has been selected for the patient.      Durable Medical Equipment      No service has been selected for the patient.      Dialysis/Infusion      No service has been selected for the patient.      Home Medical Care      No service has been selected for the patient.      Therapy      No service has been selected for the patient.      Community Resources      No service has been selected for the patient.        Transportation Services  Private: Car    Final Discharge Disposition Code: 01 - home or self-care

## 2020-07-08 ENCOUNTER — READMISSION MANAGEMENT (OUTPATIENT)
Dept: CALL CENTER | Facility: HOSPITAL | Age: 50
End: 2020-07-08

## 2020-07-08 LAB — COCCIDIOIDES IGM SER QL: 0.1 IV

## 2020-07-08 NOTE — OUTREACH NOTE
General Surgery Week 1 Survey      Responses   [facility] patient discharged from?  [facility]   Does the patient have one of the following disease processes/diagnoses(primary or secondary)?  General Surgery   Is there a successful TCM telephone encounter documented?  No   Week 1 attempt successful?  Yes   Call start time  1155   Call end time  1201   Discharge diagnosis  Lumbar 4- Lumbar 5 Laminectomy and Fusion with Instrumentation, mediastinal/hilar adenopathy and extensive interstitial changes c/w sarcoidosis.    Is patient permission given to speak with other caregiver?  Yes   List who call center can speak with  Mother   Meds reviewed with patient/caregiver?  Yes   Is the patient having any side effects they believe may be caused by any medication additions or changes?  No   Does the patient have all medications related to this admission filled (includes all antibiotics, pain medications, etc.)  Yes   Is the patient taking all medications as directed (includes completed medication regime)?  Yes   Does the patient have a follow up appointment scheduled with their surgeon?  Yes   Has the patient kept scheduled appointments due by today?  N/A   Comments  Pt has new pt appt with new PCP.    Psychosocial issues?  No   Comments  Pt reports that his mother is checking his incision daily for s/sx of infection. Denies issues. Appetite is WNL. No issues voiding. Has cane to use for ambulation. He reports some tingling in LE.    Did the patient receive a copy of their discharge instructions?  Yes   Nursing interventions  Reviewed instructions with patient   What is the patient's perception of their health status since discharge?  Improving   Nursing interventions  Nurse provided patient education   Is the patient /caregiver able to teach back basic post-op care?  Continue use of incentive spirometry at least 1 week post discharge, Practice 'cough and deep breath', Take showers only when approved by MD-sponge bathe  until then, Keep incision areas clean,dry and protected   Is the patient/caregiver able to teach back signs and symptoms of incisional infection?  Increased redness, swelling or pain at the incisonal site, Incisional warmth, Increased drainage or bleeding   Is the patient/caregiver able to teach back steps to recovery at home?  Rest and rebuild strength, gradually increase activity, Practice good oral hygiene   Is the patient/caregiver able to teach back the hierarchy of who to call/visit for symptoms/problems? PCP, Specialist, Home health nurse, Urgent Care, ED, 911  Yes   Week 1 call completed?  Yes          Felecia Alves, RADHA

## 2020-07-09 LAB — VIRAL CULTURE, GENERAL: NORMAL

## 2020-07-10 DIAGNOSIS — M48.062 SPINAL STENOSIS OF LUMBAR REGION WITH NEUROGENIC CLAUDICATION: ICD-10-CM

## 2020-07-10 RX ORDER — OXYCODONE HYDROCHLORIDE AND ACETAMINOPHEN 5; 325 MG/1; MG/1
TABLET ORAL
Qty: 42 TABLET | Refills: 0 | Status: SHIPPED | OUTPATIENT
Start: 2020-07-10 | End: 2020-07-15 | Stop reason: DRUGHIGH

## 2020-07-13 LAB
B DERMAT AB TITR SER: NEGATIVE {TITER}
COCCIDIOIDES IGG SER QL: 0.7 IV

## 2020-07-15 ENCOUNTER — TELEPHONE (OUTPATIENT)
Dept: ORTHOPEDIC SURGERY | Facility: CLINIC | Age: 50
End: 2020-07-15

## 2020-07-15 DIAGNOSIS — M48.062 SPINAL STENOSIS OF LUMBAR REGION WITH NEUROGENIC CLAUDICATION: Primary | ICD-10-CM

## 2020-07-15 RX ORDER — OXYCODONE AND ACETAMINOPHEN 7.5; 325 MG/1; MG/1
TABLET ORAL
Qty: 50 TABLET | Refills: 0 | Status: SHIPPED | OUTPATIENT
Start: 2020-07-15 | End: 2020-07-21 | Stop reason: SDUPTHER

## 2020-07-15 NOTE — TELEPHONE ENCOUNTER
Patient had spine surgery on 7/2/20 by WILMAN. Says he fell out of bed while in the hospital and is having left hip pain. Says the pain medication he is taking is not helping enough. Please advise.  WILMAN OUT OF OFFICE THIS WEEK

## 2020-07-15 NOTE — TELEPHONE ENCOUNTER
Thanks I DC'd his Percocet 5 mg and ordered 7.5 mg 1-2 p.o. every 4 hours as needed for pain.  Please make sure he has a follow-up appoint with Dr. Bustillo when he returns next week

## 2020-07-15 NOTE — TELEPHONE ENCOUNTER
Call returned to the patient.  He is complaining of some in the left lateral hip over the area of the greater trochanter.  Patient states that he does have increasing pain with weightbearing.  He is using a cane with ambulation.  Patient states that he fell in the hospital when he was trying to get out of bed to use the urinal and was still hooked up to the SCDs.  Patient reports that the nurses did help him back in the bed but at the time he had no complaints of pain.  Patient did report that he landed on that left hip area.  He has no swelling, no erythema, no obvious deformity of the leg, and no ecchymosis.  He has been taking the Percocet 5 mg 2 to 3 tablets at a time every 2-3 hours.  Advised him that he should only take 2 every 4 hours.  Patient reports that the pain is not relieved with the Percocet 5 mg.  I have discussed with the patient that would recommend that he go to the ER for an x-ray of his hip and pelvis to make sure that he does not have a possible fracture.  Patient reports that he has nobody with him today and is not able to get to the ER today.  I have asked him to try to contact family or friends to see if there is any possible way he can get transportation to the hospital.  Worse case scenario he could always call EMS.  Discussed with physician to see if they can increase his Percocet in the meantime.

## 2020-07-15 NOTE — TELEPHONE ENCOUNTER
Can you please call and check on this.  If he is having persistent hip pain may need to go to ER for x-rays to make sure there is no fracture.

## 2020-07-15 NOTE — TELEPHONE ENCOUNTER
Barry, from [facility] called to say that for opiods, they are urged to keep it under 50 mme per day.  The current prescription is for 135 mme per day.    He can do a 7 day supply.  Are you okay with that?

## 2020-07-16 ENCOUNTER — APPOINTMENT (OUTPATIENT)
Dept: CT IMAGING | Facility: HOSPITAL | Age: 50
End: 2020-07-16

## 2020-07-16 ENCOUNTER — HOSPITAL ENCOUNTER (EMERGENCY)
Facility: HOSPITAL | Age: 50
Discharge: HOME | End: 2020-07-16
Attending: EMERGENCY MEDICINE | Admitting: EMERGENCY MEDICINE

## 2020-07-16 VITALS
WEIGHT: 210 LBS | DIASTOLIC BLOOD PRESSURE: 101 MMHG | SYSTOLIC BLOOD PRESSURE: 137 MMHG | HEIGHT: 78 IN | HEART RATE: 96 BPM | TEMPERATURE: 97.7 F | BODY MASS INDEX: 24.3 KG/M2 | RESPIRATION RATE: 16 BRPM | OXYGEN SATURATION: 98 %

## 2020-07-16 DIAGNOSIS — R10.9 ACUTE LEFT FLANK PAIN: Primary | ICD-10-CM

## 2020-07-16 PROCEDURE — 74176 CT ABD & PELVIS W/O CONTRAST: CPT

## 2020-07-16 PROCEDURE — 99283 EMERGENCY DEPT VISIT LOW MDM: CPT

## 2020-07-16 PROCEDURE — 93005 ELECTROCARDIOGRAM TRACING: CPT | Performed by: EMERGENCY MEDICINE

## 2020-07-16 RX ORDER — SODIUM CHLORIDE 0.9 % (FLUSH) 0.9 %
10 SYRINGE (ML) INJECTION AS NEEDED
Status: DISCONTINUED | OUTPATIENT
Start: 2020-07-16 | End: 2020-07-16 | Stop reason: HOSPADM

## 2020-07-16 NOTE — ED NOTES
THIS RN WORE APPROPRIATE PPE INCLUDING MASK, EYE PROTECTION, AND GLOVES UPON ENTERING AND EXITING PATIENT ANDREW.        Shai Quintana, RADHA  07/16/20 1011
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
## 2020-07-16 NOTE — ED NOTES
"THIS RN WENT INTO ROOM, PT PUTTING BACK BRACE ON PT STATED \"RECEIVED PHONE CALL FOR FAMILY EMERGENCY AND HAVE TO LEAVE NOW.\" MD NUNEZ NOTIFIED AND WENT INTO RM TO TALK TO PT. PT LEFT WITHOUT RECEIVING DISCHARGE INSTRUCTIONS.      Shai Quintana RN  07/16/20 1400    "

## 2020-07-16 NOTE — ED NOTES
MD NUNEZ REMOVED DRESSING OVER LOWER BACK INCISION. INCISION WELL APPROXIMATED, CLEAN AND DRY. REPLACED DRESSING WITH NEW DRESSING.     Shai Quintana RN  07/16/20 0612

## 2020-07-16 NOTE — ED NOTES
"Pt refusing all labs, EKG, BP and pulse ox monitoring.  States \"stop, I didn't come in for all this shit, I just need an xray that's all\"  Will notify Shai Coker, RN  07/16/20 5382    "

## 2020-07-16 NOTE — ED NOTES
Patient to er from home with c/o left hip pain. Patient reported this started 3-4 days ago. Patient has mask on in triage along with staff. Patient reported recent back surgery and stated he fell while in the hospital which could have caused this pain in the hip. Was directed to come to Er for x-rays.      Leonides Soto RN  07/16/20 1144       Leonides Soto RN  07/16/20 6900

## 2020-07-17 ENCOUNTER — TRANSCRIBE ORDERS (OUTPATIENT)
Dept: ADMINISTRATIVE | Facility: HOSPITAL | Age: 50
End: 2020-07-17

## 2020-07-17 DIAGNOSIS — D86.9 SARCOIDOSIS: Primary | ICD-10-CM

## 2020-07-21 DIAGNOSIS — M48.062 SPINAL STENOSIS OF LUMBAR REGION WITH NEUROGENIC CLAUDICATION: ICD-10-CM

## 2020-07-21 RX ORDER — OXYCODONE AND ACETAMINOPHEN 7.5; 325 MG/1; MG/1
TABLET ORAL
Qty: 50 TABLET | Refills: 0 | Status: SHIPPED | OUTPATIENT
Start: 2020-07-21 | End: 2020-08-09 | Stop reason: SDUPTHER

## 2020-07-24 ENCOUNTER — TELEPHONE (OUTPATIENT)
Dept: INTERNAL MEDICINE | Age: 50
End: 2020-07-24

## 2020-07-24 ENCOUNTER — OFFICE VISIT (OUTPATIENT)
Dept: INTERNAL MEDICINE | Age: 50
End: 2020-07-24

## 2020-07-24 VITALS
OXYGEN SATURATION: 97 % | WEIGHT: 197 LBS | RESPIRATION RATE: 13 BRPM | TEMPERATURE: 97.8 F | HEIGHT: 78 IN | DIASTOLIC BLOOD PRESSURE: 80 MMHG | BODY MASS INDEX: 22.79 KG/M2 | SYSTOLIC BLOOD PRESSURE: 128 MMHG | HEART RATE: 100 BPM

## 2020-07-24 DIAGNOSIS — Z76.89 ESTABLISHING CARE WITH NEW DOCTOR, ENCOUNTER FOR: ICD-10-CM

## 2020-07-24 DIAGNOSIS — Z98.890 S/P LUMBAR LAMINECTOMY: Chronic | ICD-10-CM

## 2020-07-24 DIAGNOSIS — D86.0 SARCOIDOSIS OF LUNG (HCC): Primary | Chronic | ICD-10-CM

## 2020-07-24 PROCEDURE — 99204 OFFICE O/P NEW MOD 45 MIN: CPT | Performed by: INTERNAL MEDICINE

## 2020-07-24 NOTE — TELEPHONE ENCOUNTER
PATIENT CALLED BACK TO ALERT OFFICE HIS INSURANCE COMPANY HAS DR COLON AS HIS NEW PCP AS OF 7-1-2020 AND THAT THEY WILL PAY FOR TODAY'S CLAIM WITH NO ISSUES.     PATIENT ALSO STATED THEY WILL BE MAILING HIM A NEW CARD IN THE NEXT WEEK OR SO ANY OTHER QUESTIONS PLEASE CONTACT THE PATIENT [phone]
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
## 2020-07-24 NOTE — TELEPHONE ENCOUNTER
I called patient due to the PCP name on his insurance card. What is on there by the name of Angelika Fairbanks, and needs to have it changed effective today, to Dr. Reyna for claims to be paid, as well as referrals to be obtained should he need one.     Patient said he would call the insurance company today and take care of it.

## 2020-07-28 ENCOUNTER — TELEPHONE (OUTPATIENT)
Dept: ORTHOPEDIC SURGERY | Facility: CLINIC | Age: 50
End: 2020-07-28

## 2020-07-28 LAB — FUNGUS WND CULT: NORMAL

## 2020-07-28 RX ORDER — ONDANSETRON 4 MG/1
4 TABLET, FILM COATED ORAL EVERY 6 HOURS PRN
Qty: 15 TABLET | Refills: 0 | Status: SHIPPED | OUTPATIENT
Start: 2020-07-28 | End: 2020-09-29

## 2020-07-28 NOTE — TELEPHONE ENCOUNTER
And that: Please see what is going on.  He did have sarcoidosis and his current complaints may not be back related but I do need to see him in follow-up and get an x-ray

## 2020-07-28 NOTE — TELEPHONE ENCOUNTER
PATIENT MISSED HIS APPT THIS AM, SAID HE IS HAVING COMPLICATIONS AND NEEDS HELP. SAID HE CANNOT EAT AND HAS BEEN SICK TO STOMACH. FEELS LIKE PRESSURE ON HIS ABDOMEN AND HE IS URINATING A LOT AND HURTING. WANTS TO TALK TO [facility] PLEASE

## 2020-07-28 NOTE — TELEPHONE ENCOUNTER
Call returned to the patient.  The last 3 days he is developed increasing nausea.  He also complains of constipation.  He is not taking his Percocet very often, but remains on the Colace.  He did have 2 small hard stools this morning.  He is passing gas.  Denies any abdominal distention or abdominal pain.  Patient was seen in the ER for left flank pain about 10 to 12 days ago.  CT of his abdomen and pelvis done at that time show distended loops of the small bowel and possible ileus or early small bowel obstruction.  I have recommended that he try over-the-counter laxatives to see if that will help with the constipation.  Will also give him some Zofran for the nausea.  New prescription was sent in to [facility] pharmacy at [phone] for Zofran 4 mg, #15, directions are to take 1 p.o. every 6 hours as needed nausea.  I have advised the patient that if his symptoms do not improve would recommend that he get in touch with his primary care physician or possibly go back to the ER for further evaluation

## 2020-07-29 ENCOUNTER — APPOINTMENT (OUTPATIENT)
Dept: GENERAL RADIOLOGY | Facility: HOSPITAL | Age: 50
End: 2020-07-29

## 2020-07-29 ENCOUNTER — HOSPITAL ENCOUNTER (INPATIENT)
Facility: HOSPITAL | Age: 50
LOS: 7 days | Discharge: HOME | End: 2020-08-06
Attending: EMERGENCY MEDICINE | Admitting: INTERNAL MEDICINE

## 2020-07-29 DIAGNOSIS — K59.00 CONSTIPATION, UNSPECIFIED CONSTIPATION TYPE: ICD-10-CM

## 2020-07-29 DIAGNOSIS — T81.89XA LUMBAR SURGICAL WOUND FLUID COLLECTION, INITIAL ENCOUNTER: ICD-10-CM

## 2020-07-29 DIAGNOSIS — E83.52 HYPERCALCEMIA: Primary | ICD-10-CM

## 2020-07-29 DIAGNOSIS — Z86.2 HISTORY OF SARCOIDOSIS: ICD-10-CM

## 2020-07-29 PROCEDURE — 74018 RADEX ABDOMEN 1 VIEW: CPT

## 2020-07-29 PROCEDURE — 99284 EMERGENCY DEPT VISIT MOD MDM: CPT

## 2020-07-29 PROCEDURE — 25010000002 ONDANSETRON PER 1 MG: Performed by: EMERGENCY MEDICINE

## 2020-07-29 PROCEDURE — 25010000002 MORPHINE PER 10 MG: Performed by: EMERGENCY MEDICINE

## 2020-07-29 PROCEDURE — 83690 ASSAY OF LIPASE: CPT | Performed by: EMERGENCY MEDICINE

## 2020-07-29 RX ORDER — MORPHINE SULFATE 2 MG/ML
4 INJECTION, SOLUTION INTRAMUSCULAR; INTRAVENOUS ONCE
Status: COMPLETED | OUTPATIENT
Start: 2020-07-29 | End: 2020-07-29

## 2020-07-29 RX ORDER — ONDANSETRON 2 MG/ML
4 INJECTION INTRAMUSCULAR; INTRAVENOUS ONCE
Status: COMPLETED | OUTPATIENT
Start: 2020-07-29 | End: 2020-07-29

## 2020-07-29 RX ORDER — SODIUM CHLORIDE 0.9 % (FLUSH) 0.9 %
10 SYRINGE (ML) INJECTION AS NEEDED
Status: DISCONTINUED | OUTPATIENT
Start: 2020-07-29 | End: 2020-08-06 | Stop reason: HOSPADM

## 2020-07-29 RX ADMIN — ONDANSETRON 4 MG: 2 INJECTION INTRAMUSCULAR; INTRAVENOUS at 23:54

## 2020-07-29 RX ADMIN — MORPHINE SULFATE 4 MG: 2 INJECTION, SOLUTION INTRAMUSCULAR; INTRAVENOUS at 23:54

## 2020-07-29 RX ADMIN — SODIUM CHLORIDE 1000 ML: 9 INJECTION, SOLUTION INTRAVENOUS at 23:53

## 2020-07-30 ENCOUNTER — APPOINTMENT (OUTPATIENT)
Dept: CT IMAGING | Facility: HOSPITAL | Age: 50
End: 2020-07-30

## 2020-07-30 ENCOUNTER — APPOINTMENT (OUTPATIENT)
Dept: GENERAL RADIOLOGY | Facility: HOSPITAL | Age: 50
End: 2020-07-30

## 2020-07-30 PROBLEM — N17.9 AKI (ACUTE KIDNEY INJURY): Status: ACTIVE | Noted: 2020-07-30

## 2020-07-30 PROBLEM — K59.00 CONSTIPATION: Status: ACTIVE | Noted: 2020-07-30

## 2020-07-30 PROBLEM — E83.52 HYPERCALCEMIA: Status: ACTIVE | Noted: 2020-07-30

## 2020-07-30 PROBLEM — R11.2 NON-INTRACTABLE VOMITING WITH NAUSEA: Status: ACTIVE | Noted: 2020-07-30

## 2020-07-30 LAB
25(OH)D3 SERPL-MCNC: 35 NG/ML (ref 30–100)
ALBUMIN SERPL-MCNC: 3 G/DL (ref 3.5–5.2)
ALBUMIN SERPL-MCNC: 3.8 G/DL (ref 3.5–5.2)
ALBUMIN/GLOB SERPL: 1 G/DL
ALBUMIN/GLOB SERPL: 1.1 G/DL
ALP SERPL-CCNC: 63 U/L (ref 39–117)
ALP SERPL-CCNC: 83 U/L (ref 39–117)
ALT SERPL W P-5'-P-CCNC: 10 U/L (ref 1–41)
ALT SERPL W P-5'-P-CCNC: 13 U/L (ref 1–41)
ANION GAP SERPL CALCULATED.3IONS-SCNC: 10.2 MMOL/L (ref 5–15)
ANION GAP SERPL CALCULATED.3IONS-SCNC: 7 MMOL/L (ref 5–15)
AST SERPL-CCNC: 12 U/L (ref 1–40)
AST SERPL-CCNC: 14 U/L (ref 1–40)
B PARAPERT DNA SPEC QL NAA+PROBE: NOT DETECTED
B PERT DNA SPEC QL NAA+PROBE: NOT DETECTED
BASOPHILS # BLD AUTO: 0.01 10*3/MM3 (ref 0–0.2)
BASOPHILS # BLD AUTO: 0.02 10*3/MM3 (ref 0–0.2)
BASOPHILS NFR BLD AUTO: 0.1 % (ref 0–1.5)
BASOPHILS NFR BLD AUTO: 0.3 % (ref 0–1.5)
BILIRUB SERPL-MCNC: 0.5 MG/DL (ref 0–1.2)
BILIRUB SERPL-MCNC: 0.7 MG/DL (ref 0–1.2)
BUN SERPL-MCNC: 16 MG/DL (ref 6–20)
BUN SERPL-MCNC: 19 MG/DL (ref 6–20)
BUN/CREAT SERPL: 10.5 (ref 7–25)
BUN/CREAT SERPL: 10.6 (ref 7–25)
C PNEUM DNA NPH QL NAA+NON-PROBE: NOT DETECTED
CALCIUM SPEC-SCNC: 12 MG/DL (ref 8.6–10.5)
CALCIUM SPEC-SCNC: 14.4 MG/DL (ref 8.6–10.5)
CHLORIDE SERPL-SCNC: 102 MMOL/L (ref 98–107)
CHLORIDE SERPL-SCNC: 96 MMOL/L (ref 98–107)
CO2 SERPL-SCNC: 24 MMOL/L (ref 22–29)
CO2 SERPL-SCNC: 26.8 MMOL/L (ref 22–29)
CREAT SERPL-MCNC: 1.52 MG/DL (ref 0.76–1.27)
CREAT SERPL-MCNC: 1.8 MG/DL (ref 0.76–1.27)
DEPRECATED RDW RBC AUTO: 43.2 FL (ref 37–54)
DEPRECATED RDW RBC AUTO: 46.6 FL (ref 37–54)
EOSINOPHIL # BLD AUTO: 0.03 10*3/MM3 (ref 0–0.4)
EOSINOPHIL # BLD AUTO: 0.05 10*3/MM3 (ref 0–0.4)
EOSINOPHIL NFR BLD AUTO: 0.4 % (ref 0.3–6.2)
EOSINOPHIL NFR BLD AUTO: 0.8 % (ref 0.3–6.2)
ERYTHROCYTE [DISTWIDTH] IN BLOOD BY AUTOMATED COUNT: 13.7 % (ref 12.3–15.4)
ERYTHROCYTE [DISTWIDTH] IN BLOOD BY AUTOMATED COUNT: 14.5 % (ref 12.3–15.4)
FLUAV H1 2009 PAND RNA NPH QL NAA+PROBE: NOT DETECTED
FLUAV H1 HA GENE NPH QL NAA+PROBE: NOT DETECTED
FLUAV H3 RNA NPH QL NAA+PROBE: NOT DETECTED
FLUAV SUBTYP SPEC NAA+PROBE: NOT DETECTED
FLUBV RNA ISLT QL NAA+PROBE: NOT DETECTED
GFR SERPL CREATININE-BSD FRML MDRD: 49 ML/MIN/1.73
GFR SERPL CREATININE-BSD FRML MDRD: 59 ML/MIN/1.73
GLOBULIN UR ELPH-MCNC: 2.9 GM/DL
GLOBULIN UR ELPH-MCNC: 3.5 GM/DL
GLUCOSE SERPL-MCNC: 107 MG/DL (ref 65–99)
GLUCOSE SERPL-MCNC: 95 MG/DL (ref 65–99)
HADV DNA SPEC NAA+PROBE: NOT DETECTED
HCOV 229E RNA SPEC QL NAA+PROBE: NOT DETECTED
HCOV HKU1 RNA SPEC QL NAA+PROBE: NOT DETECTED
HCOV NL63 RNA SPEC QL NAA+PROBE: NOT DETECTED
HCOV OC43 RNA SPEC QL NAA+PROBE: NOT DETECTED
HCT VFR BLD AUTO: 34.7 % (ref 37.5–51)
HCT VFR BLD AUTO: 42.7 % (ref 37.5–51)
HGB BLD-MCNC: 11.7 G/DL (ref 13–17.7)
HGB BLD-MCNC: 14.3 G/DL (ref 13–17.7)
HMPV RNA NPH QL NAA+NON-PROBE: NOT DETECTED
HPIV1 RNA SPEC QL NAA+PROBE: NOT DETECTED
HPIV2 RNA SPEC QL NAA+PROBE: NOT DETECTED
HPIV3 RNA NPH QL NAA+PROBE: NOT DETECTED
HPIV4 P GENE NPH QL NAA+PROBE: NOT DETECTED
IMM GRANULOCYTES # BLD AUTO: 0.03 10*3/MM3 (ref 0–0.05)
IMM GRANULOCYTES # BLD AUTO: 0.06 10*3/MM3 (ref 0–0.05)
IMM GRANULOCYTES NFR BLD AUTO: 0.5 % (ref 0–0.5)
IMM GRANULOCYTES NFR BLD AUTO: 0.7 % (ref 0–0.5)
LIPASE SERPL-CCNC: 13 U/L (ref 13–60)
LYMPHOCYTES # BLD AUTO: 0.98 10*3/MM3 (ref 0.7–3.1)
LYMPHOCYTES # BLD AUTO: 1.08 10*3/MM3 (ref 0.7–3.1)
LYMPHOCYTES NFR BLD AUTO: 11.5 % (ref 19.6–45.3)
LYMPHOCYTES NFR BLD AUTO: 17.2 % (ref 19.6–45.3)
M PNEUMO IGG SER IA-ACNC: NOT DETECTED
MCH RBC QN AUTO: 29.2 PG (ref 26.6–33)
MCH RBC QN AUTO: 29.3 PG (ref 26.6–33)
MCHC RBC AUTO-ENTMCNC: 33.5 G/DL (ref 31.5–35.7)
MCHC RBC AUTO-ENTMCNC: 33.7 G/DL (ref 31.5–35.7)
MCV RBC AUTO: 86.5 FL (ref 79–97)
MCV RBC AUTO: 87.5 FL (ref 79–97)
MONOCYTES # BLD AUTO: 0.85 10*3/MM3 (ref 0.1–0.9)
MONOCYTES # BLD AUTO: 0.93 10*3/MM3 (ref 0.1–0.9)
MONOCYTES NFR BLD AUTO: 10 % (ref 5–12)
MONOCYTES NFR BLD AUTO: 14.8 % (ref 5–12)
NEUTROPHILS NFR BLD AUTO: 4.17 10*3/MM3 (ref 1.7–7)
NEUTROPHILS NFR BLD AUTO: 6.58 10*3/MM3 (ref 1.7–7)
NEUTROPHILS NFR BLD AUTO: 66.4 % (ref 42.7–76)
NEUTROPHILS NFR BLD AUTO: 77.3 % (ref 42.7–76)
NRBC BLD AUTO-RTO: 0 /100 WBC (ref 0–0.2)
NRBC BLD AUTO-RTO: 0 /100 WBC (ref 0–0.2)
PLATELET # BLD AUTO: 274 10*3/MM3 (ref 140–450)
PLATELET # BLD AUTO: 326 10*3/MM3 (ref 140–450)
PMV BLD AUTO: 9.4 FL (ref 6–12)
PMV BLD AUTO: 9.5 FL (ref 6–12)
POTASSIUM SERPL-SCNC: 3 MMOL/L (ref 3.5–5.2)
POTASSIUM SERPL-SCNC: 3.7 MMOL/L (ref 3.5–5.2)
PROT SERPL-MCNC: 5.9 G/DL (ref 6–8.5)
PROT SERPL-MCNC: 7.3 G/DL (ref 6–8.5)
RBC # BLD AUTO: 4.01 10*6/MM3 (ref 4.14–5.8)
RBC # BLD AUTO: 4.88 10*6/MM3 (ref 4.14–5.8)
RHINOVIRUS RNA SPEC NAA+PROBE: NOT DETECTED
RSV RNA NPH QL NAA+NON-PROBE: NOT DETECTED
SARS-COV-2 RNA NPH QL NAA+NON-PROBE: NOT DETECTED
SODIUM SERPL-SCNC: 133 MMOL/L (ref 136–145)
SODIUM SERPL-SCNC: 133 MMOL/L (ref 136–145)
WBC # BLD AUTO: 6.28 10*3/MM3 (ref 3.4–10.8)
WBC # BLD AUTO: 8.51 10*3/MM3 (ref 3.4–10.8)

## 2020-07-30 PROCEDURE — 63710000001 CALCITONIN PER 400 UNITS: Performed by: INTERNAL MEDICINE

## 2020-07-30 PROCEDURE — 80053 COMPREHEN METABOLIC PANEL: CPT | Performed by: EMERGENCY MEDICINE

## 2020-07-30 PROCEDURE — 25010000002 MORPHINE PER 10 MG: Performed by: INTERNAL MEDICINE

## 2020-07-30 PROCEDURE — 85025 COMPLETE CBC W/AUTO DIFF WBC: CPT | Performed by: NURSE PRACTITIONER

## 2020-07-30 PROCEDURE — 25010000002 ONDANSETRON PER 1 MG: Performed by: INTERNAL MEDICINE

## 2020-07-30 PROCEDURE — 80053 COMPREHEN METABOLIC PANEL: CPT | Performed by: NURSE PRACTITIONER

## 2020-07-30 PROCEDURE — 25010000002 IOPAMIDOL 61 % SOLUTION: Performed by: EMERGENCY MEDICINE

## 2020-07-30 PROCEDURE — 82397 CHEMILUMINESCENT ASSAY: CPT | Performed by: INTERNAL MEDICINE

## 2020-07-30 PROCEDURE — 74177 CT ABD & PELVIS W/CONTRAST: CPT

## 2020-07-30 PROCEDURE — 85025 COMPLETE CBC W/AUTO DIFF WBC: CPT | Performed by: EMERGENCY MEDICINE

## 2020-07-30 PROCEDURE — 25010000002 FUROSEMIDE PER 20 MG: Performed by: INTERNAL MEDICINE

## 2020-07-30 PROCEDURE — 82306 VITAMIN D 25 HYDROXY: CPT | Performed by: INTERNAL MEDICINE

## 2020-07-30 PROCEDURE — 72100 X-RAY EXAM L-S SPINE 2/3 VWS: CPT

## 2020-07-30 PROCEDURE — 25010000003 POTASSIUM CHLORIDE 10 MEQ/100ML SOLUTION: Performed by: INTERNAL MEDICINE

## 2020-07-30 PROCEDURE — 36415 COLL VENOUS BLD VENIPUNCTURE: CPT | Performed by: NURSE PRACTITIONER

## 2020-07-30 PROCEDURE — 0202U NFCT DS 22 TRGT SARS-COV-2: CPT | Performed by: EMERGENCY MEDICINE

## 2020-07-30 RX ORDER — FUROSEMIDE 10 MG/ML
80 INJECTION INTRAMUSCULAR; INTRAVENOUS EVERY 12 HOURS
Status: DISCONTINUED | OUTPATIENT
Start: 2020-07-30 | End: 2020-07-31

## 2020-07-30 RX ORDER — CALCITONIN SALMON 200 [USP'U]/ML
4 INJECTION, SOLUTION INTRAMUSCULAR; SUBCUTANEOUS ONCE
Status: COMPLETED | OUTPATIENT
Start: 2020-07-30 | End: 2020-07-30

## 2020-07-30 RX ORDER — BISACODYL 10 MG
10 SUPPOSITORY, RECTAL RECTAL DAILY PRN
Status: DISCONTINUED | OUTPATIENT
Start: 2020-07-30 | End: 2020-08-06 | Stop reason: HOSPADM

## 2020-07-30 RX ORDER — ACETAMINOPHEN 325 MG/1
650 TABLET ORAL EVERY 4 HOURS PRN
Status: DISCONTINUED | OUTPATIENT
Start: 2020-07-30 | End: 2020-08-06 | Stop reason: HOSPADM

## 2020-07-30 RX ORDER — SENNA PLUS 8.6 MG/1
2 TABLET ORAL 2 TIMES DAILY
Status: DISCONTINUED | OUTPATIENT
Start: 2020-07-30 | End: 2020-08-04

## 2020-07-30 RX ORDER — POTASSIUM CHLORIDE 750 MG/1
80 CAPSULE, EXTENDED RELEASE ORAL ONCE
Status: DISCONTINUED | OUTPATIENT
Start: 2020-07-30 | End: 2020-07-31

## 2020-07-30 RX ORDER — SODIUM CHLORIDE 9 MG/ML
150 INJECTION, SOLUTION INTRAVENOUS CONTINUOUS
Status: DISCONTINUED | OUTPATIENT
Start: 2020-07-30 | End: 2020-08-01

## 2020-07-30 RX ORDER — ACETAMINOPHEN 160 MG/5ML
650 SOLUTION ORAL EVERY 4 HOURS PRN
Status: DISCONTINUED | OUTPATIENT
Start: 2020-07-30 | End: 2020-08-06 | Stop reason: HOSPADM

## 2020-07-30 RX ORDER — SODIUM CHLORIDE 0.9 % (FLUSH) 0.9 %
10 SYRINGE (ML) INJECTION EVERY 12 HOURS SCHEDULED
Status: DISCONTINUED | OUTPATIENT
Start: 2020-07-30 | End: 2020-08-06 | Stop reason: HOSPADM

## 2020-07-30 RX ORDER — MORPHINE SULFATE 2 MG/ML
2 INJECTION, SOLUTION INTRAMUSCULAR; INTRAVENOUS EVERY 4 HOURS PRN
Status: DISCONTINUED | OUTPATIENT
Start: 2020-07-30 | End: 2020-08-01

## 2020-07-30 RX ORDER — CALCITONIN SALMON 200 [USP'U]/ML
4 INJECTION, SOLUTION INTRAMUSCULAR; SUBCUTANEOUS ONCE
Status: DISCONTINUED | OUTPATIENT
Start: 2020-07-30 | End: 2020-07-30

## 2020-07-30 RX ORDER — SODIUM CHLORIDE 0.9 % (FLUSH) 0.9 %
10 SYRINGE (ML) INJECTION AS NEEDED
Status: DISCONTINUED | OUTPATIENT
Start: 2020-07-30 | End: 2020-08-06 | Stop reason: HOSPADM

## 2020-07-30 RX ORDER — NITROGLYCERIN 0.4 MG/1
0.4 TABLET SUBLINGUAL
Status: DISCONTINUED | OUTPATIENT
Start: 2020-07-30 | End: 2020-08-06 | Stop reason: HOSPADM

## 2020-07-30 RX ORDER — DOCUSATE SODIUM 100 MG/1
100 CAPSULE, LIQUID FILLED ORAL 2 TIMES DAILY
Status: DISCONTINUED | OUTPATIENT
Start: 2020-07-30 | End: 2020-08-04

## 2020-07-30 RX ORDER — OXYCODONE AND ACETAMINOPHEN 7.5; 325 MG/1; MG/1
1 TABLET ORAL EVERY 4 HOURS PRN
Status: DISCONTINUED | OUTPATIENT
Start: 2020-07-30 | End: 2020-08-01

## 2020-07-30 RX ORDER — DOCUSATE SODIUM 100 MG/1
100 CAPSULE, LIQUID FILLED ORAL 2 TIMES DAILY
Status: DISCONTINUED | OUTPATIENT
Start: 2020-07-30 | End: 2020-08-01

## 2020-07-30 RX ORDER — ACETAMINOPHEN 650 MG/1
650 SUPPOSITORY RECTAL EVERY 4 HOURS PRN
Status: DISCONTINUED | OUTPATIENT
Start: 2020-07-30 | End: 2020-08-06 | Stop reason: HOSPADM

## 2020-07-30 RX ORDER — ONDANSETRON 2 MG/ML
4 INJECTION INTRAMUSCULAR; INTRAVENOUS EVERY 6 HOURS PRN
Status: DISCONTINUED | OUTPATIENT
Start: 2020-07-30 | End: 2020-08-06 | Stop reason: HOSPADM

## 2020-07-30 RX ORDER — POTASSIUM CHLORIDE 7.45 MG/ML
10 INJECTION INTRAVENOUS
Status: DISCONTINUED | OUTPATIENT
Start: 2020-07-30 | End: 2020-08-06 | Stop reason: HOSPADM

## 2020-07-30 RX ADMIN — CALCITONIN SALMON 330 UNITS: 200 INJECTION, SOLUTION INTRAMUSCULAR; SUBCUTANEOUS at 18:45

## 2020-07-30 RX ADMIN — MORPHINE SULFATE 2 MG: 2 INJECTION, SOLUTION INTRAMUSCULAR; INTRAVENOUS at 13:16

## 2020-07-30 RX ADMIN — SODIUM CHLORIDE 175 ML/HR: 9 INJECTION, SOLUTION INTRAVENOUS at 04:23

## 2020-07-30 RX ADMIN — BISACODYL 10 MG: 10 SUPPOSITORY RECTAL at 04:23

## 2020-07-30 RX ADMIN — ONDANSETRON 4 MG: 2 INJECTION INTRAMUSCULAR; INTRAVENOUS at 21:23

## 2020-07-30 RX ADMIN — SODIUM CHLORIDE 175 ML/HR: 9 INJECTION, SOLUTION INTRAVENOUS at 11:56

## 2020-07-30 RX ADMIN — POTASSIUM CHLORIDE 10 MEQ: 7.46 INJECTION, SOLUTION INTRAVENOUS at 20:09

## 2020-07-30 RX ADMIN — IOPAMIDOL 85 ML: 612 INJECTION, SOLUTION INTRAVENOUS at 01:28

## 2020-07-30 RX ADMIN — ONDANSETRON 4 MG: 2 INJECTION INTRAMUSCULAR; INTRAVENOUS at 15:43

## 2020-07-30 RX ADMIN — POTASSIUM CHLORIDE 10 MEQ: 7.46 INJECTION, SOLUTION INTRAVENOUS at 17:18

## 2020-07-30 RX ADMIN — SODIUM CHLORIDE, PRESERVATIVE FREE 10 ML: 5 INJECTION INTRAVENOUS at 04:24

## 2020-07-30 RX ADMIN — SODIUM CHLORIDE 1000 ML: 9 INJECTION, SOLUTION INTRAVENOUS at 02:05

## 2020-07-30 RX ADMIN — MORPHINE SULFATE 2 MG: 2 INJECTION, SOLUTION INTRAMUSCULAR; INTRAVENOUS at 04:23

## 2020-07-30 RX ADMIN — ONDANSETRON 4 MG: 2 INJECTION INTRAMUSCULAR; INTRAVENOUS at 10:12

## 2020-07-30 RX ADMIN — FUROSEMIDE 80 MG: 10 INJECTION, SOLUTION INTRAMUSCULAR; INTRAVENOUS at 11:57

## 2020-07-30 RX ADMIN — SODIUM CHLORIDE, PRESERVATIVE FREE 10 ML: 5 INJECTION INTRAVENOUS at 21:23

## 2020-07-30 RX ADMIN — CALCITONIN SALMON 330 UNITS: 200 INJECTION, SOLUTION INTRAMUSCULAR; SUBCUTANEOUS at 06:32

## 2020-07-31 LAB
ANION GAP SERPL CALCULATED.3IONS-SCNC: 11.6 MMOL/L (ref 5–15)
BUN SERPL-MCNC: 12 MG/DL (ref 6–20)
BUN/CREAT SERPL: 6.7 (ref 7–25)
CALCIUM SPEC-SCNC: 14.2 MG/DL (ref 8.6–10.5)
CHLORIDE SERPL-SCNC: 96 MMOL/L (ref 98–107)
CO2 SERPL-SCNC: 25.4 MMOL/L (ref 22–29)
CREAT SERPL-MCNC: 1.79 MG/DL (ref 0.76–1.27)
DEPRECATED RDW RBC AUTO: 42.3 FL (ref 37–54)
ERYTHROCYTE [DISTWIDTH] IN BLOOD BY AUTOMATED COUNT: 13.8 % (ref 12.3–15.4)
GFR SERPL CREATININE-BSD FRML MDRD: 49 ML/MIN/1.73
GLUCOSE SERPL-MCNC: 92 MG/DL (ref 65–99)
HCT VFR BLD AUTO: 36.1 % (ref 37.5–51)
HGB BLD-MCNC: 12.4 G/DL (ref 13–17.7)
MCH RBC QN AUTO: 29.2 PG (ref 26.6–33)
MCHC RBC AUTO-ENTMCNC: 34.3 G/DL (ref 31.5–35.7)
MCV RBC AUTO: 85.1 FL (ref 79–97)
PLATELET # BLD AUTO: 295 10*3/MM3 (ref 140–450)
PMV BLD AUTO: 9.7 FL (ref 6–12)
POTASSIUM SERPL-SCNC: 3.2 MMOL/L (ref 3.5–5.2)
RBC # BLD AUTO: 4.24 10*6/MM3 (ref 4.14–5.8)
SODIUM SERPL-SCNC: 133 MMOL/L (ref 136–145)
WBC # BLD AUTO: 7.38 10*3/MM3 (ref 3.4–10.8)

## 2020-07-31 PROCEDURE — 25010000003 POTASSIUM CHLORIDE 10 MEQ/100ML SOLUTION: Performed by: INTERNAL MEDICINE

## 2020-07-31 PROCEDURE — 63710000001 METHOTREXATE PER 2.5 MG: Performed by: INTERNAL MEDICINE

## 2020-07-31 PROCEDURE — 63710000001 CALCITONIN PER 400 UNITS: Performed by: INTERNAL MEDICINE

## 2020-07-31 PROCEDURE — 25010000002 ONDANSETRON PER 1 MG: Performed by: INTERNAL MEDICINE

## 2020-07-31 PROCEDURE — 85027 COMPLETE CBC AUTOMATED: CPT | Performed by: INTERNAL MEDICINE

## 2020-07-31 PROCEDURE — 99024 POSTOP FOLLOW-UP VISIT: CPT | Performed by: ORTHOPAEDIC SURGERY

## 2020-07-31 PROCEDURE — 80048 BASIC METABOLIC PNL TOTAL CA: CPT | Performed by: INTERNAL MEDICINE

## 2020-07-31 RX ORDER — POTASSIUM CHLORIDE 750 MG/1
40 CAPSULE, EXTENDED RELEASE ORAL ONCE
Status: COMPLETED | OUTPATIENT
Start: 2020-07-31 | End: 2020-07-31

## 2020-07-31 RX ORDER — CALCITONIN SALMON 200 [USP'U]/ML
4 INJECTION, SOLUTION INTRAMUSCULAR; SUBCUTANEOUS EVERY 12 HOURS
Status: COMPLETED | OUTPATIENT
Start: 2020-07-31 | End: 2020-08-01

## 2020-07-31 RX ADMIN — POTASSIUM CHLORIDE 10 MEQ: 7.46 INJECTION, SOLUTION INTRAVENOUS at 00:02

## 2020-07-31 RX ADMIN — METHOTREXATE SODIUM 5 MG: 2.5 TABLET ORAL at 19:10

## 2020-07-31 RX ADMIN — POTASSIUM CHLORIDE 40 MEQ: 10 CAPSULE, COATED, EXTENDED RELEASE ORAL at 21:52

## 2020-07-31 RX ADMIN — POTASSIUM CHLORIDE 10 MEQ: 7.46 INJECTION, SOLUTION INTRAVENOUS at 10:52

## 2020-07-31 RX ADMIN — CALCITONIN SALMON 328 UNITS: 200 INJECTION, SOLUTION INTRAMUSCULAR; SUBCUTANEOUS at 16:36

## 2020-07-31 RX ADMIN — SODIUM CHLORIDE, PRESERVATIVE FREE 10 ML: 5 INJECTION INTRAVENOUS at 21:55

## 2020-07-31 RX ADMIN — POTASSIUM CHLORIDE 10 MEQ: 7.46 INJECTION, SOLUTION INTRAVENOUS at 03:12

## 2020-07-31 RX ADMIN — SODIUM CHLORIDE 150 ML/HR: 9 INJECTION, SOLUTION INTRAVENOUS at 16:33

## 2020-07-31 RX ADMIN — SENNOSIDES 2 TABLET: 8.6 TABLET, FILM COATED ORAL at 21:52

## 2020-07-31 RX ADMIN — DOCUSATE SODIUM 100 MG: 100 CAPSULE, LIQUID FILLED ORAL at 21:52

## 2020-07-31 RX ADMIN — POTASSIUM CHLORIDE 40 MEQ: 10 CAPSULE, COATED, EXTENDED RELEASE ORAL at 16:34

## 2020-07-31 RX ADMIN — POTASSIUM CHLORIDE 10 MEQ: 7.46 INJECTION, SOLUTION INTRAVENOUS at 05:52

## 2020-07-31 RX ADMIN — ONDANSETRON 4 MG: 2 INJECTION INTRAMUSCULAR; INTRAVENOUS at 19:11

## 2020-07-31 RX ADMIN — ONDANSETRON 4 MG: 2 INJECTION INTRAMUSCULAR; INTRAVENOUS at 03:12

## 2020-07-31 RX ADMIN — ONDANSETRON 4 MG: 2 INJECTION INTRAMUSCULAR; INTRAVENOUS at 09:47

## 2020-08-01 ENCOUNTER — PREP FOR SURGERY (OUTPATIENT)
Dept: OTHER | Facility: HOSPITAL | Age: 50
End: 2020-08-01

## 2020-08-01 ENCOUNTER — APPOINTMENT (OUTPATIENT)
Dept: GENERAL RADIOLOGY | Facility: HOSPITAL | Age: 50
End: 2020-08-01

## 2020-08-01 DIAGNOSIS — R11.0 NAUSEA: Primary | ICD-10-CM

## 2020-08-01 LAB
B PARAPERT DNA SPEC QL NAA+PROBE: NOT DETECTED
B PERT DNA SPEC QL NAA+PROBE: NOT DETECTED
BILIRUB UR QL STRIP: NEGATIVE
C PNEUM DNA NPH QL NAA+NON-PROBE: NOT DETECTED
CLARITY UR: CLEAR
COLOR UR: YELLOW
CREAT UR-MCNC: 57.7 MG/DL
FLUAV H1 2009 PAND RNA NPH QL NAA+PROBE: NOT DETECTED
FLUAV H1 HA GENE NPH QL NAA+PROBE: NOT DETECTED
FLUAV H3 RNA NPH QL NAA+PROBE: NOT DETECTED
FLUAV SUBTYP SPEC NAA+PROBE: NOT DETECTED
FLUBV RNA ISLT QL NAA+PROBE: NOT DETECTED
GLUCOSE UR STRIP-MCNC: NEGATIVE MG/DL
HADV DNA SPEC NAA+PROBE: NOT DETECTED
HCOV 229E RNA SPEC QL NAA+PROBE: NOT DETECTED
HCOV HKU1 RNA SPEC QL NAA+PROBE: NOT DETECTED
HCOV NL63 RNA SPEC QL NAA+PROBE: NOT DETECTED
HCOV OC43 RNA SPEC QL NAA+PROBE: NOT DETECTED
HGB UR QL STRIP.AUTO: NEGATIVE
HMPV RNA NPH QL NAA+NON-PROBE: NOT DETECTED
HPIV1 RNA SPEC QL NAA+PROBE: NOT DETECTED
HPIV2 RNA SPEC QL NAA+PROBE: NOT DETECTED
HPIV3 RNA NPH QL NAA+PROBE: NOT DETECTED
HPIV4 P GENE NPH QL NAA+PROBE: NOT DETECTED
KETONES UR QL STRIP: ABNORMAL
LEUKOCYTE ESTERASE UR QL STRIP.AUTO: NEGATIVE
M PNEUMO IGG SER IA-ACNC: NOT DETECTED
NITRITE UR QL STRIP: NEGATIVE
PH UR STRIP.AUTO: 8 [PH] (ref 5–8)
PROT UR QL STRIP: NEGATIVE
PROT UR-MCNC: 13 MG/DL
PROT/CREAT UR: 225.3 MG/G CREA (ref 0–200)
RHINOVIRUS RNA SPEC NAA+PROBE: NOT DETECTED
RSV RNA NPH QL NAA+NON-PROBE: NOT DETECTED
SARS-COV-2 RNA NPH QL NAA+NON-PROBE: NOT DETECTED
SP GR UR STRIP: 1.01 (ref 1–1.03)
UROBILINOGEN UR QL STRIP: ABNORMAL

## 2020-08-01 PROCEDURE — 63710000001 PROMETHAZINE PER 25 MG: Performed by: NURSE PRACTITIONER

## 2020-08-01 PROCEDURE — 84156 ASSAY OF PROTEIN URINE: CPT | Performed by: INTERNAL MEDICINE

## 2020-08-01 PROCEDURE — 99222 1ST HOSP IP/OBS MODERATE 55: CPT | Performed by: INTERNAL MEDICINE

## 2020-08-01 PROCEDURE — 25010000002 ONDANSETRON PER 1 MG: Performed by: INTERNAL MEDICINE

## 2020-08-01 PROCEDURE — 0202U NFCT DS 22 TRGT SARS-COV-2: CPT | Performed by: INTERNAL MEDICINE

## 2020-08-01 PROCEDURE — 63710000001 CALCITONIN PER 400 UNITS: Performed by: INTERNAL MEDICINE

## 2020-08-01 PROCEDURE — 25010000002 PROCHLORPERAZINE 10 MG/2ML SOLUTION: Performed by: NURSE PRACTITIONER

## 2020-08-01 PROCEDURE — 25810000003 SODIUM CHLORIDE 0.9 % WITH KCL 20 MEQ 20-0.9 MEQ/L-% SOLUTION: Performed by: INTERNAL MEDICINE

## 2020-08-01 PROCEDURE — 81003 URINALYSIS AUTO W/O SCOPE: CPT | Performed by: INTERNAL MEDICINE

## 2020-08-01 PROCEDURE — 74018 RADEX ABDOMEN 1 VIEW: CPT

## 2020-08-01 PROCEDURE — 82570 ASSAY OF URINE CREATININE: CPT | Performed by: INTERNAL MEDICINE

## 2020-08-01 RX ORDER — POTASSIUM CHLORIDE 750 MG/1
40 CAPSULE, EXTENDED RELEASE ORAL EVERY 6 HOURS
Status: DISCONTINUED | OUTPATIENT
Start: 2020-08-01 | End: 2020-08-01

## 2020-08-01 RX ORDER — PROCHLORPERAZINE EDISYLATE 5 MG/ML
2.5 INJECTION INTRAMUSCULAR; INTRAVENOUS ONCE
Status: COMPLETED | OUTPATIENT
Start: 2020-08-01 | End: 2020-08-01

## 2020-08-01 RX ORDER — PROCHLORPERAZINE EDISYLATE 5 MG/ML
5 INJECTION INTRAMUSCULAR; INTRAVENOUS EVERY 6 HOURS PRN
Status: DISCONTINUED | OUTPATIENT
Start: 2020-08-01 | End: 2020-08-06 | Stop reason: HOSPADM

## 2020-08-01 RX ORDER — PROMETHAZINE HYDROCHLORIDE 25 MG/1
25 TABLET ORAL EVERY 6 HOURS PRN
Status: DISCONTINUED | OUTPATIENT
Start: 2020-08-01 | End: 2020-08-06 | Stop reason: HOSPADM

## 2020-08-01 RX ORDER — SODIUM CHLORIDE AND POTASSIUM CHLORIDE 150; 900 MG/100ML; MG/100ML
250 INJECTION, SOLUTION INTRAVENOUS CONTINUOUS
Status: DISCONTINUED | OUTPATIENT
Start: 2020-08-01 | End: 2020-08-02

## 2020-08-01 RX ADMIN — PROMETHAZINE HYDROCHLORIDE 25 MG: 25 TABLET ORAL at 23:36

## 2020-08-01 RX ADMIN — POTASSIUM CHLORIDE AND SODIUM CHLORIDE 250 ML/HR: 900; 150 INJECTION, SOLUTION INTRAVENOUS at 17:57

## 2020-08-01 RX ADMIN — ONDANSETRON 4 MG: 2 INJECTION INTRAMUSCULAR; INTRAVENOUS at 17:57

## 2020-08-01 RX ADMIN — SODIUM CHLORIDE, PRESERVATIVE FREE 10 ML: 5 INJECTION INTRAVENOUS at 21:53

## 2020-08-01 RX ADMIN — ONDANSETRON 4 MG: 2 INJECTION INTRAMUSCULAR; INTRAVENOUS at 02:27

## 2020-08-01 RX ADMIN — PROCHLORPERAZINE EDISYLATE 2.5 MG: 5 INJECTION INTRAMUSCULAR; INTRAVENOUS at 05:10

## 2020-08-01 RX ADMIN — POTASSIUM CHLORIDE AND SODIUM CHLORIDE 250 ML/HR: 900; 150 INJECTION, SOLUTION INTRAVENOUS at 22:00

## 2020-08-01 RX ADMIN — POTASSIUM CHLORIDE AND SODIUM CHLORIDE 250 ML/HR: 900; 150 INJECTION, SOLUTION INTRAVENOUS at 11:57

## 2020-08-01 RX ADMIN — ONDANSETRON 4 MG: 2 INJECTION INTRAMUSCULAR; INTRAVENOUS at 10:37

## 2020-08-01 RX ADMIN — CALCITONIN SALMON 328 UNITS: 200 INJECTION, SOLUTION INTRAMUSCULAR; SUBCUTANEOUS at 17:58

## 2020-08-01 RX ADMIN — CALCITONIN SALMON 328 UNITS: 200 INJECTION, SOLUTION INTRAMUSCULAR; SUBCUTANEOUS at 02:26

## 2020-08-01 NOTE — PROGRESS NOTES
"   LOS: 2 days    Patient Care Team:  Vinod Reyna MD as PCP - General (Internal Medicine)    Chief Complaint:    Chief Complaint   Patient presents with   • Constipation   • Nausea     Follow UP JACKI CKD3  Subjective     Interval History:   Nausea, pain.  No dyspnea.   Refusing medications including steroids, potassium replacement, calcitonin.     Objective     Vital Signs  Temp:  [96.5 °F (35.8 °C)-97.7 °F (36.5 °C)] 97.6 °F (36.4 °C)  Heart Rate:  [66-89] 66  Resp:  [16-18] 18  BP: (126-146)/(90-96) 146/92    Flowsheet Rows      First Filed Value   Admission Height  198.1 cm (78\") Documented at 07/29/2020 2005   Admission Weight  82.7 kg (182 lb 6.4 oz) Documented at 07/29/2020 2237          No intake/output data recorded.  I/O last 3 completed shifts:  In: 1367 [P.O.:360; I.V.:607; IV Piggyback:400]  Out: 4225 [Urine:4225]    Intake/Output Summary (Last 24 hours) at 8/1/2020 0928  Last data filed at 7/31/2020 2300  Gross per 24 hour   Intake --   Output 3050 ml   Net -3050 ml       Physical Exam:  GEN, obviously uncomfortable with nausea.  Lying in bed.  Poor eye contact.   Neck supple no JVD  Lungs CTA bilat no rales, poor effort.  CV RRR no m/g  Abd soft NT/ND  vasc no pedal/ankle edema  Skin is warm and dry.     Results Review:    Results from last 7 days   Lab Units 07/31/20  1037 07/30/20  0544 07/30/20  0019   SODIUM mmol/L 133* 133* 133*   POTASSIUM mmol/L 3.2* 3.0* 3.7   CHLORIDE mmol/L 96* 102 96*   CO2 mmol/L 25.4 24.0 26.8   BUN mg/dL 12 16 19   CREATININE mg/dL 1.79* 1.52* 1.80*   CALCIUM mg/dL 14.2* 12.0* 14.4*   BILIRUBIN mg/dL  --  0.5 0.7   ALK PHOS U/L  --  63 83   ALT (SGPT) U/L  --  10 13   AST (SGOT) U/L  --  12 14   GLUCOSE mg/dL 92 95 107*       Estimated Creatinine Clearance: 57.8 mL/min (A) (by C-G formula based on SCr of 1.79 mg/dL (H)).                Results from last 7 days   Lab Units 07/31/20  0716 07/30/20  0544 07/30/20  0019   WBC 10*3/mm3 7.38 6.28 8.51   HEMOGLOBIN g/dL 12.4* " 11.7* 14.3   PLATELETS 10*3/mm3 295 274 326               Imaging Results (Last 24 Hours)     ** No results found for the last 24 hours. **          calcitonin 4 Units/kg Intramuscular Q12H   docusate sodium 100 mg Oral BID   docusate sodium 100 mg Oral BID   methotrexate 5 mg Oral Weekly   polyethylene glycol 17 g Oral Daily   predniSONE 60 mg Oral Daily With Breakfast   senna 2 tablet Oral BID   sodium chloride 10 mL Intravenous Q12H       custom IV infusion builder        Medication Review:   Current Facility-Administered Medications   Medication Dose Route Frequency Provider Last Rate Last Dose   • acetaminophen (TYLENOL) tablet 650 mg  650 mg Oral Q4H PRN Sophia Pennington APRN        Or   • acetaminophen (TYLENOL) 160 MG/5ML solution 650 mg  650 mg Oral Q4H PRN Sophia Pennington APRN        Or   • acetaminophen (TYLENOL) suppository 650 mg  650 mg Rectal Q4H PRN Sophia Pennington APRN       • bisacodyl (DULCOLAX) suppository 10 mg  10 mg Rectal Daily PRN Sophia Pennington APRN   10 mg at 07/30/20 0423   • calcitonin (MIACALCIN) injection 328 Units  4 Units/kg Intramuscular Q12H Dashawn De La O MD   328 Units at 08/01/20 0226   • docusate sodium (COLACE) capsule 100 mg  100 mg Oral BID Sophia Pennington APRN   100 mg at 07/31/20 2152   • docusate sodium (COLACE) capsule 100 mg  100 mg Oral BID Steffen Meadows MD       • methotrexate tablet 5 mg  5 mg Oral Weekly Tony Hoang MD   5 mg at 07/31/20 1910   • morphine injection 2 mg  2 mg Intravenous Q4H PRN Steffen Meadows MD   2 mg at 07/30/20 1316   • nitroglycerin (NITROSTAT) SL tablet 0.4 mg  0.4 mg Sublingual Q5 Min PRN Sophia Pennington APRN       • ondansetron (ZOFRAN) injection 4 mg  4 mg Intravenous Q6H PRN Levi Warren MD   4 mg at 08/01/20 0227   • oxyCODONE-acetaminophen (PERCOCET) 7.5-325 MG per tablet 1 tablet  1 tablet Oral Q4H PRN Steffen Meadows MD       • polyethylene glycol 3350 powder  (packet)  17 g Oral Daily Sophia Pennington, STEVE       • potassium chloride 10 mEq in 100 mL IVPB  10 mEq Intravenous Q1H PRN Levi Warren  mL/hr at 07/31/20 1052 10 mEq at 07/31/20 1052   • predniSONE (DELTASONE) tablet 60 mg  60 mg Oral Daily With Breakfast Aric Mojica MD       • senna (SENOKOT) tablet 2 tablet  2 tablet Oral BID Steffen Meadows MD   2 tablet at 07/31/20 2152   • sodium chloride 0.9 % 990 mL with potassium chloride 20 mEq infusion   Intravenous Continuous Abril Worthy MD       • sodium chloride 0.9 % flush 10 mL  10 mL Intravenous PRN Natalio Murillo MD       • sodium chloride 0.9 % flush 10 mL  10 mL Intravenous Q12H Sophia Pennington APRN   10 mL at 07/31/20 2155   • sodium chloride 0.9 % flush 10 mL  10 mL Intravenous PRN Sophia Pennington APRN           Assessment/Plan   - JACKI on CKD III:  prerenal azotemia due to severe hypercalcemia; Cr down to 1.5 then back up to 1.79.  Increase hydration   - Hypercalcemia:due to sarcoidosis.  Calcium is back up to 14.2.  Patient refused prednisone, calcitonin, Lasix and potassium replacement.  Pulmonary following.  - Hypokalemia:  Still low.  Refusing replacement as above.  Refused IV potassium and oral potassium.    - Hyponatremia: mild, Na 133 yest  - Sarcoidosis: appreciate pulm input  - Chronic back pain     Plan  We will change IV fluid to normal saline with 20 potassium chloride at 250 cc/h.  Recheck urinalysis.  Replace potassium when patient is agreeable.  Recheck magnesium.  Discussed with nursing staff.      Hypercalcemia    Sarcoidosis of lung (CMS/[facility])    S/P lumbar laminectomy    JACKI (acute kidney injury) (CMS/[facility])    Constipation    Non-intractable vomiting with nausea      Abril Worthy MD  08/01/20  09:28

## 2020-08-01 NOTE — NURSING NOTE
1500  Lab came to patient's room 5 times to draw RFP. Pt refused to have lab drawn each of the 5 times.

## 2020-08-01 NOTE — PLAN OF CARE
Pt reports BM earlier. intermittent nausea. Does not communicate w staff at times, not responding to questions. VSS no s/s of distress.  Problem: Patient Care Overview  Goal: Plan of Care Review  Outcome: Ongoing (interventions implemented as appropriate)  Flowsheets (Taken 8/1/2020 0621)  Progress: improving  Plan of Care Reviewed With: patient  Goal: Individualization and Mutuality  Outcome: Ongoing (interventions implemented as appropriate)  Goal: Discharge Needs Assessment  Outcome: Ongoing (interventions implemented as appropriate)  Goal: Interprofessional Rounds/Family Conf  Outcome: Ongoing (interventions implemented as appropriate)     Problem: Constipation (Adult)  Goal: Identify Related Risk Factors and Signs and Symptoms  Outcome: Ongoing (interventions implemented as appropriate)  Goal: Effective Bowel Elimination  Outcome: Ongoing (interventions implemented as appropriate)  Goal: Comfort  Outcome: Ongoing (interventions implemented as appropriate)

## 2020-08-01 NOTE — PROGRESS NOTES
" LOS: 2 days     Name: Bonifacio Lewis  Age: 50 y.o.  Sex: male  :  1970  MRN: [id number]         Primary Care Physician: Vinod Reyna MD    Subjective   Subjective  His main complaint is that of nausea.  He told me yesterday he is not vomiting but has now changed his story and states he has been vomiting since he was in the emergency room.  His sister at bedside confirms this.  He had a bowel movement overnight last night which was large.  He continues to be fairly noncompliant with attempted treatments and continues to cite his nausea and vomiting as the reason.    Objective   Vital Signs  Temp:  [96.5 °F (35.8 °C)-97.7 °F (36.5 °C)] 97.6 °F (36.4 °C)  Heart Rate:  [66-89] 66  Resp:  [16-18] 18  BP: (126-146)/(90-96) 146/92  Body mass index is 20.87 kg/m².    Objective:  General Appearance:  In no acute distress and uncomfortable.    Vital signs: (most recent): Blood pressure 146/92, pulse 66, temperature 97.6 °F (36.4 °C), temperature source Oral, resp. rate 18, height 198.1 cm (77.99\"), weight 81.9 kg (180 lb 9.6 oz), SpO2 100 %.    Lungs:  Normal effort and normal respiratory rate.    Heart: Normal rate.  Regular rhythm.    Abdomen: Abdomen is soft.  Bowel sounds are normal.   There is no abdominal tenderness.     Extremities: There is no dependent edema or local swelling.    Neurological: Patient is alert and oriented to person, place and time.    Skin:  Warm and dry.              Results Review:       I reviewed the patient's new clinical results.    Results from last 7 days   Lab Units 20  0716 20  0544 20  0019   WBC 10*3/mm3 7.38 6.28 8.51   HEMOGLOBIN g/dL 12.4* 11.7* 14.3   PLATELETS 10*3/mm3 295 040 326     Results from last 7 days   Lab Units 20  1037 20  0544 20  0019   SODIUM mmol/L 133* 133* 133*   POTASSIUM mmol/L 3.2* 3.0* 3.7   CHLORIDE mmol/L 96* 102 96*   CO2 mmol/L 25.4 24.0 26.8   BUN mg/dL 12 16 19   CREATININE mg/dL 1.79* 1.52* 1.80*   CALCIUM " mg/dL 14.2* 12.0* 14.4*   GLUCOSE mg/dL 92 95 107*                 Scheduled Meds:     calcitonin 4 Units/kg Intramuscular Q12H   docusate sodium 100 mg Oral BID   docusate sodium 100 mg Oral BID   methotrexate 5 mg Oral Weekly   polyethylene glycol 17 g Oral Daily   predniSONE 60 mg Oral Daily With Breakfast   senna 2 tablet Oral BID   sodium chloride 10 mL Intravenous Q12H     PRN Meds:   •  acetaminophen **OR** acetaminophen **OR** acetaminophen  •  bisacodyl  •  Morphine  •  nitroglycerin  •  ondansetron  •  oxyCODONE-acetaminophen  •  potassium chloride  •  [COMPLETED] Insert peripheral IV **AND** sodium chloride  •  sodium chloride  Continuous Infusions:    sodium chloride 0.9 % with KCl 20 mEq 250 mL/hr Last Rate: 250 mL/hr (08/01/20 1157)       Assessment/Plan   Active Hospital Problems    Diagnosis  POA   • **Hypercalcemia [E83.52]  Yes   • JACKI (acute kidney injury) (CMS/HCC) [N17.9]  Yes   • Constipation [K59.00]  Yes   • Non-intractable vomiting with nausea [R11.2]  Yes   • 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       Assessment & Plan    Hypercalcemia  -Worse on labs today.  Nephrology following.    He remains fairly noncompliant  -IV fluid rate has been increased today  -Appreciate assistance from nephrology     Nausea and vomiting  -This is his main complaint and he cites these symptoms as the reason why he has been noncompliant.  -CT of the abdomen and pelvis on 7/30/2020 showed increased stool.  He has now had a large bowel movement but symptoms persist  -We will repeat a abdominal x-ray and ask GI to see him  -My hope is that if we can get him feeling better from a GI standpoint he will become more compliant with treatment directed towards his hypercalcemia, hypokalemia and acute kidney injury     JACKI  - monitor BMP with IVF  - Renal following     Sarcoidosis  - pulmonary  status appears stable  - continue on prednisone  - Pulmonary following     S/P lumbar Laminectomy  - Seen by Dr. Bustillo who was not concerned about his incision site     I discussed the patient's findings and my recommendations with patient and nursing staff.     VTE Prophylaxis - SCDs.  Code Status - Full code.      I wore full protective equipment throughout the patient encounter including eye protection and facemask.  Hand hygiene was performed before donning protective equipment and after removal when leaving the room.    Levi Warren MD  [facility]  08/01/20  1:57 PM    Addendum:  Discussed with Dr. Luna.  We really aren't sure what to make of his refusal to receive treatment.  This does raise the question of decisionality vs just making really poor decisions.  Either way, think it would be best to get a Psychiatry consult to help evaluate this.

## 2020-08-01 NOTE — PROGRESS NOTES
"  Daily Progress Note.   [address]  8/1/2020    Patient:  Name:  Bonifacio Lewis  MRN:  [id number]  1970  50 y.o.  male         CC: hypercalcemia    Interval History:  States he is not doing well and that he is very nauseated.    Denies soa, cough, sputum production, just states he is throwing up a lot and cant take medicine until he isnt throwing up.    ROS: No fever, no diarrhea, no chest pain  PMFSSH: no change    Physical Exam:  /92 (BP Location: Left arm, Patient Position: Lying)   Pulse 66   Temp 97.6 °F (36.4 °C) (Oral)   Resp 18   Ht 198.1 cm (77.99\")   Wt 81.9 kg (180 lb 9.6 oz)   SpO2 100%   BMI 20.87 kg/m²   Body mass index is 20.87 kg/m².    Intake/Output Summary (Last 24 hours) at 8/1/2020 1303  Last data filed at 8/1/2020 1158  Gross per 24 hour   Intake --   Output 4300 ml   Net -4300 ml     General appearance: ill appearing, conversant   Eyes: anicteric sclerae, moist conjunctivae; no lid-lag; PERRLA  HENT: Atraumatic; oropharynx clear with moist mucous membranes and no mucosal ulcerations; normal hard and soft palate  Neck: Trachea midline; FROM, supple, will not allow palpatoin  Lungs: CTA, with normal respiratory effort and no intercostal retractions  CV: RRR, no MRGs   Abdomen: Soft, non-tender; no masses or HSM  Extremities: No peripheral edema or extremity lymphadenopathy  Skin: Normal temperature, turgor and texture; no rash, ulcers or subcutaneous nodules  Psych: agitated affect, alert and oriented to person place    Data Review:  Notable Labs:  Results from last 7 days   Lab Units 07/31/20  0716 07/30/20  0544 07/30/20  0019   WBC 10*3/mm3 7.38 6.28 8.51   HEMOGLOBIN g/dL 12.4* 11.7* 14.3   PLATELETS 10*3/mm3 295 274 326     Results from last 7 days   Lab Units 07/31/20  1037 07/30/20  0544 07/30/20  0019   SODIUM mmol/L 133* 133* 133*   POTASSIUM mmol/L 3.2* 3.0* 3.7   CHLORIDE mmol/L 96* 102 96*   CO2 mmol/L 25.4 24.0 26.8   BUN mg/dL 12 16 19 "   CREATININE mg/dL 1.79* 1.52* 1.80*   GLUCOSE mg/dL 92 95 107*   CALCIUM mg/dL 14.2* 12.0* 14.4*   Estimated Creatinine Clearance: 57.8 mL/min (A) (by C-G formula based on SCr of 1.79 mg/dL (H)).    Results from last 7 days   Lab Units 07/31/20  0716 07/30/20  0544 07/30/20  0019   AST (SGOT) U/L  --  12 14   ALT (SGPT) U/L  --  10 13   PLATELETS 10*3/mm3 295 274 326             Imaging:  Reviewed chest images personally from past 3 days    Scheduled meds:      calcitonin 4 Units/kg Intramuscular Q12H   docusate sodium 100 mg Oral BID   docusate sodium 100 mg Oral BID   methotrexate 5 mg Oral Weekly   polyethylene glycol 17 g Oral Daily   predniSONE 60 mg Oral Daily With Breakfast   senna 2 tablet Oral BID   sodium chloride 10 mL Intravenous Q12H       ASSESSMENT  /  PLAN:  1. Sarcoidosis with significant lung involvement and with hypercalcemia  2. Acute kidney injury  3. Constipation with abdominal distention and discomfort  4. Nausea and vomiting       Patient has chronic kidney disease, creatinine clearance is less than 50 L/min so we need to be careful with the methotrexate dosing, and try to use 50% of the maximum allowed dose    If mtx not tolerated future considerations of rituxan, arava may be options    5mg mtx weekly continue for now watching LFTs and renal function cosely    Suspect his nausea related to hypercalcemia, hopefully patient will allow medications to treat this as guided by nephrology.  Will order medications to try to help with underlying etiology.    Follow up with Dr Paniagua two weeks.    All issues new to me today.  Prior hospital course, labs and imaging reviewed.  Coordinated care with Dr Briana Luna MD  [facility]  08/01/20  1:03 PM

## 2020-08-02 LAB
ALBUMIN SERPL-MCNC: 3.2 G/DL (ref 3.5–5.2)
ANION GAP SERPL CALCULATED.3IONS-SCNC: 14.7 MMOL/L (ref 5–15)
ANION GAP SERPL CALCULATED.3IONS-SCNC: 8.5 MMOL/L (ref 5–15)
BASOPHILS # BLD AUTO: 0.01 10*3/MM3 (ref 0–0.2)
BASOPHILS NFR BLD AUTO: 0.1 % (ref 0–1.5)
BUN SERPL-MCNC: 8 MG/DL (ref 6–20)
BUN SERPL-MCNC: 9 MG/DL (ref 6–20)
BUN/CREAT SERPL: 6.7 (ref 7–25)
BUN/CREAT SERPL: 7.1 (ref 7–25)
CALCIUM SPEC-SCNC: 10.4 MG/DL (ref 8.6–10.5)
CALCIUM SPEC-SCNC: 12.8 MG/DL (ref 8.6–10.5)
CHLORIDE SERPL-SCNC: 108 MMOL/L (ref 98–107)
CHLORIDE SERPL-SCNC: 118 MMOL/L (ref 98–107)
CO2 SERPL-SCNC: 17.5 MMOL/L (ref 22–29)
CO2 SERPL-SCNC: 18.3 MMOL/L (ref 22–29)
CREAT SERPL-MCNC: 1.12 MG/DL (ref 0.76–1.27)
CREAT SERPL-MCNC: 1.35 MG/DL (ref 0.76–1.27)
DEPRECATED RDW RBC AUTO: 44 FL (ref 37–54)
EOSINOPHIL # BLD AUTO: 0.01 10*3/MM3 (ref 0–0.4)
EOSINOPHIL NFR BLD AUTO: 0.1 % (ref 0.3–6.2)
ERYTHROCYTE [DISTWIDTH] IN BLOOD BY AUTOMATED COUNT: 14 % (ref 12.3–15.4)
GFR SERPL CREATININE-BSD FRML MDRD: 68 ML/MIN/1.73
GFR SERPL CREATININE-BSD FRML MDRD: 84 ML/MIN/1.73
GLUCOSE SERPL-MCNC: 100 MG/DL (ref 65–99)
GLUCOSE SERPL-MCNC: 75 MG/DL (ref 65–99)
HCT VFR BLD AUTO: 29.2 % (ref 37.5–51)
HGB BLD-MCNC: 10 G/DL (ref 13–17.7)
IMM GRANULOCYTES # BLD AUTO: 0.05 10*3/MM3 (ref 0–0.05)
IMM GRANULOCYTES NFR BLD AUTO: 0.6 % (ref 0–0.5)
LYMPHOCYTES # BLD AUTO: 1.13 10*3/MM3 (ref 0.7–3.1)
LYMPHOCYTES NFR BLD AUTO: 13.5 % (ref 19.6–45.3)
MAGNESIUM SERPL-MCNC: 0.9 MG/DL (ref 1.6–2.6)
MAGNESIUM SERPL-MCNC: 1.3 MG/DL (ref 1.6–2.6)
MCH RBC QN AUTO: 29.4 PG (ref 26.6–33)
MCHC RBC AUTO-ENTMCNC: 34.2 G/DL (ref 31.5–35.7)
MCV RBC AUTO: 85.9 FL (ref 79–97)
MONOCYTES # BLD AUTO: 0.81 10*3/MM3 (ref 0.1–0.9)
MONOCYTES NFR BLD AUTO: 9.7 % (ref 5–12)
NEUTROPHILS NFR BLD AUTO: 6.35 10*3/MM3 (ref 1.7–7)
NEUTROPHILS NFR BLD AUTO: 76 % (ref 42.7–76)
NRBC BLD AUTO-RTO: 0 /100 WBC (ref 0–0.2)
PHOSPHATE SERPL-MCNC: 2.4 MG/DL (ref 2.5–4.5)
PHOSPHATE SERPL-MCNC: 2.5 MG/DL (ref 2.5–4.5)
PLATELET # BLD AUTO: 280 10*3/MM3 (ref 140–450)
PMV BLD AUTO: 9.7 FL (ref 6–12)
POTASSIUM SERPL-SCNC: 3.1 MMOL/L (ref 3.5–5.2)
POTASSIUM SERPL-SCNC: 6.1 MMOL/L (ref 3.5–5.2)
RBC # BLD AUTO: 3.4 10*6/MM3 (ref 4.14–5.8)
SODIUM SERPL-SCNC: 141 MMOL/L (ref 136–145)
SODIUM SERPL-SCNC: 144 MMOL/L (ref 136–145)
WBC # BLD AUTO: 8.36 10*3/MM3 (ref 3.4–10.8)

## 2020-08-02 PROCEDURE — 25010000002 MAGNESIUM SULFATE IN D5W 1G/100ML (PREMIX) 1-5 GM/100ML-% SOLUTION: Performed by: INTERNAL MEDICINE

## 2020-08-02 PROCEDURE — 80048 BASIC METABOLIC PNL TOTAL CA: CPT | Performed by: INTERNAL MEDICINE

## 2020-08-02 PROCEDURE — 99232 SBSQ HOSP IP/OBS MODERATE 35: CPT | Performed by: INTERNAL MEDICINE

## 2020-08-02 PROCEDURE — 83735 ASSAY OF MAGNESIUM: CPT | Performed by: INTERNAL MEDICINE

## 2020-08-02 PROCEDURE — 84100 ASSAY OF PHOSPHORUS: CPT | Performed by: INTERNAL MEDICINE

## 2020-08-02 PROCEDURE — 85025 COMPLETE CBC W/AUTO DIFF WBC: CPT | Performed by: INTERNAL MEDICINE

## 2020-08-02 PROCEDURE — 25010000002 ONDANSETRON PER 1 MG: Performed by: INTERNAL MEDICINE

## 2020-08-02 PROCEDURE — 63710000001 PROMETHAZINE PER 25 MG: Performed by: NURSE PRACTITIONER

## 2020-08-02 PROCEDURE — 63710000001 PREDNISONE PER 5 MG: Performed by: INTERNAL MEDICINE

## 2020-08-02 PROCEDURE — 80069 RENAL FUNCTION PANEL: CPT | Performed by: INTERNAL MEDICINE

## 2020-08-02 PROCEDURE — 25010000003 POTASSIUM CHLORIDE 10 MEQ/100ML SOLUTION: Performed by: INTERNAL MEDICINE

## 2020-08-02 PROCEDURE — 25010000002 PROCHLORPERAZINE 10 MG/2ML SOLUTION: Performed by: NURSE PRACTITIONER

## 2020-08-02 PROCEDURE — C1751 CATH, INF, PER/CENT/MIDLINE: HCPCS

## 2020-08-02 RX ORDER — MAGNESIUM SULFATE 1 G/100ML
1 INJECTION INTRAVENOUS
Status: DISCONTINUED | OUTPATIENT
Start: 2020-08-02 | End: 2020-08-02

## 2020-08-02 RX ORDER — MAGNESIUM SULFATE 1 G/100ML
1 INJECTION INTRAVENOUS
Status: COMPLETED | OUTPATIENT
Start: 2020-08-02 | End: 2020-08-02

## 2020-08-02 RX ORDER — POTASSIUM CHLORIDE 7.45 MG/ML
10 INJECTION INTRAVENOUS
Status: DISPENSED | OUTPATIENT
Start: 2020-08-02 | End: 2020-08-02

## 2020-08-02 RX ORDER — SODIUM CHLORIDE 450 MG/100ML
75 INJECTION, SOLUTION INTRAVENOUS CONTINUOUS
Status: DISCONTINUED | OUTPATIENT
Start: 2020-08-02 | End: 2020-08-03

## 2020-08-02 RX ADMIN — MAGNESIUM SULFATE 1 G: 1 INJECTION INTRAVENOUS at 17:18

## 2020-08-02 RX ADMIN — SENNOSIDES 2 TABLET: 8.6 TABLET, FILM COATED ORAL at 09:04

## 2020-08-02 RX ADMIN — DOCUSATE SODIUM 100 MG: 100 CAPSULE, LIQUID FILLED ORAL at 21:29

## 2020-08-02 RX ADMIN — SODIUM CHLORIDE, PRESERVATIVE FREE 10 ML: 5 INJECTION INTRAVENOUS at 21:30

## 2020-08-02 RX ADMIN — MAGNESIUM SULFATE 1 G: 1 INJECTION INTRAVENOUS at 18:10

## 2020-08-02 RX ADMIN — MAGNESIUM SULFATE 1 G: 1 INJECTION INTRAVENOUS at 11:07

## 2020-08-02 RX ADMIN — DOCUSATE SODIUM 100 MG: 100 CAPSULE, LIQUID FILLED ORAL at 09:04

## 2020-08-02 RX ADMIN — ONDANSETRON 4 MG: 2 INJECTION INTRAMUSCULAR; INTRAVENOUS at 21:29

## 2020-08-02 RX ADMIN — POTASSIUM CHLORIDE 10 MEQ: 7.46 INJECTION, SOLUTION INTRAVENOUS at 17:19

## 2020-08-02 RX ADMIN — ONDANSETRON 4 MG: 2 INJECTION INTRAMUSCULAR; INTRAVENOUS at 09:15

## 2020-08-02 RX ADMIN — SENNOSIDES 2 TABLET: 8.6 TABLET, FILM COATED ORAL at 21:29

## 2020-08-02 RX ADMIN — SODIUM BICARBONATE: 84 INJECTION, SOLUTION INTRAVENOUS at 11:18

## 2020-08-02 RX ADMIN — PROCHLORPERAZINE EDISYLATE 5 MG: 5 INJECTION INTRAMUSCULAR; INTRAVENOUS at 03:00

## 2020-08-02 RX ADMIN — SODIUM CHLORIDE 75 ML/HR: 4.5 INJECTION, SOLUTION INTRAVENOUS at 17:18

## 2020-08-02 RX ADMIN — POTASSIUM CHLORIDE 10 MEQ: 7.46 INJECTION, SOLUTION INTRAVENOUS at 18:11

## 2020-08-02 RX ADMIN — MAGNESIUM SULFATE 1 G: 1 INJECTION INTRAVENOUS at 12:07

## 2020-08-02 RX ADMIN — POTASSIUM CHLORIDE 10 MEQ: 7.46 INJECTION, SOLUTION INTRAVENOUS at 21:29

## 2020-08-02 RX ADMIN — POLYETHYLENE GLYCOL 3350 17 G: 17 POWDER, FOR SOLUTION ORAL at 09:04

## 2020-08-02 RX ADMIN — PROMETHAZINE HYDROCHLORIDE 25 MG: 25 TABLET ORAL at 17:38

## 2020-08-02 RX ADMIN — PREDNISONE 60 MG: 50 TABLET ORAL at 09:04

## 2020-08-02 NOTE — PLAN OF CARE
Patient resting comfortably.  Labs redrawn this afternoon following suspicious values from this morning's tests.  Patient had three different IVs infiltrate in 3 hours, and the IV team was consulted to place a midline. VSS  Nausea continues
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
## 2020-08-02 NOTE — PROGRESS NOTES
"   LOS: 3 days    Patient Care Team:  Vinod Reyna MD as PCP - General (Internal Medicine)    Chief Complaint:    Chief Complaint   Patient presents with   • Constipation   • Nausea     Follow UP JACKI CKD3  Subjective     Interval History:   Nausea, pain.  No dyspnea.  Voiding well.  Still refusing some medications.  Critical lab values returned this morning.    Objective     Vital Signs  Temp:  [97 °F (36.1 °C)-98.9 °F (37.2 °C)] 98.1 °F (36.7 °C)  Heart Rate:  [71-93] 71  Resp:  [16-18] 18  BP: (141-164)/() 141/90    Flowsheet Rows      First Filed Value   Admission Height  198.1 cm (78\") Documented at 07/29/2020 2005   Admission Weight  82.7 kg (182 lb 6.4 oz) Documented at 07/29/2020 2237          I/O this shift:  In: -   Out: 1075 [Urine:1075]  I/O last 3 completed shifts:  In: -   Out: 4850 [Urine:4850]    Intake/Output Summary (Last 24 hours) at 8/2/2020 0927  Last data filed at 8/2/2020 0757  Gross per 24 hour   Intake --   Output 4625 ml   Net -4625 ml       Physical Exam:  GEN, obviously uncomfortable with nausea.  Lying in bed.  Poor eye contact.   Neck supple no JVD  Lungs CTA bilat no rales, poor effort.  CV RRR no m/g  Abd soft NT/ND  vasc no pedal/ankle edema  Skin is warm and dry.     Results Review:    Results from last 7 days   Lab Units 08/02/20  0551 07/31/20  1037 07/30/20  0544 07/30/20  0019   SODIUM mmol/L 144 133* 133* 133*   POTASSIUM mmol/L 6.1* 3.2* 3.0* 3.7   CHLORIDE mmol/L 118* 96* 102 96*   CO2 mmol/L 17.5* 25.4 24.0 26.8   BUN mg/dL 8 12 16 19   CREATININE mg/dL 1.12 1.79* 1.52* 1.80*   CALCIUM mg/dL 10.4 14.2* 12.0* 14.4*   BILIRUBIN mg/dL  --   --  0.5 0.7   ALK PHOS U/L  --   --  63 83   ALT (SGPT) U/L  --   --  10 13   AST (SGOT) U/L  --   --  12 14   GLUCOSE mg/dL 75 92 95 107*       Estimated Creatinine Clearance: 92.4 mL/min (by C-G formula based on SCr of 1.12 mg/dL).    Results from last 7 days   Lab Units 08/02/20  0551   MAGNESIUM mg/dL 0.9*   PHOSPHORUS mg/dL 2.4* "             Results from last 7 days   Lab Units 08/02/20  0551 07/31/20  0716 07/30/20  0544 07/30/20  0019   WBC 10*3/mm3 8.36 7.38 6.28 8.51   HEMOGLOBIN g/dL 10.0* 12.4* 11.7* 14.3   PLATELETS 10*3/mm3 280 295 274 326               Imaging Results (Last 24 Hours)     Procedure Component Value Units Date/Time    XR Abdomen KUB [[id number]] Collected:  08/01/20 1605     Updated:  08/01/20 1614    Narrative:       XR ABDOMEN KUB-     INDICATIONS: Nausea, vomiting, constipation     TECHNIQUE: Supine views of the abdomen     COMPARISON:  image from CT from 07/30/2020     FINDINGS:      The bowel gas pattern is nonobstructive. No supine evidence for free  intraperitoneal gas. Amount of stool in the rectosigmoid colon appears  decreased. No definite nephrolithiasis. Follow-up as indications  persist.     Persistent infiltrates in the bilateral lower lungs.             Impression:          As described.     This report was finalized on 8/1/2020 4:10 PM by Dr. Vinod Stauffer M.D.             docusate sodium 100 mg Oral BID   magnesium sulfate 1 g Intravenous Q1H   methotrexate 5 mg Oral Weekly   polyethylene glycol 17 g Oral Daily   predniSONE 60 mg Oral Daily With Breakfast   senna 2 tablet Oral BID   sodium bicarbonate 50 mEq Intravenous Once   sodium chloride 10 mL Intravenous Q12H       custom IV infusion builder        Medication Review:   Current Facility-Administered Medications   Medication Dose Route Frequency Provider Last Rate Last Dose   • acetaminophen (TYLENOL) tablet 650 mg  650 mg Oral Q4H PRN Sophia Pennington APRN        Or   • acetaminophen (TYLENOL) 160 MG/5ML solution 650 mg  650 mg Oral Q4H PRN Sophia Pennington APRN        Or   • acetaminophen (TYLENOL) suppository 650 mg  650 mg Rectal Q4H PRN Sophia Pennington APRN       • bisacodyl (DULCOLAX) suppository 10 mg  10 mg Rectal Daily PRN Sophia Pennington APRN   10 mg at 07/30/20 0423   • dextrose 5 % 925 mL with sodium  bicarbonate 8.4 % 75 mEq infusion   Intravenous Continuous Abril Worthy MD       • docusate sodium (COLACE) capsule 100 mg  100 mg Oral BID Sophia Pennington APRN   100 mg at 08/02/20 0904   • magnesium sulfate in D5W 1g/100mL (PREMIX)  1 g Intravenous Q1H Abril Worthy MD       • methotrexate tablet 5 mg  5 mg Oral Weekly Tony Hoang MD   5 mg at 07/31/20 1910   • nitroglycerin (NITROSTAT) SL tablet 0.4 mg  0.4 mg Sublingual Q5 Min PRN Sophia Pennington APRN       • ondansetron (ZOFRAN) injection 4 mg  4 mg Intravenous Q6H PRN Levi Warren MD   4 mg at 08/02/20 0915   • polyethylene glycol 3350 powder (packet)  17 g Oral Daily Sophia Pennington APRN   17 g at 08/02/20 0904   • potassium chloride 10 mEq in 100 mL IVPB  10 mEq Intravenous Q1H PRN Levi Warren  mL/hr at 07/31/20 1052 10 mEq at 07/31/20 1052   • predniSONE (DELTASONE) tablet 60 mg  60 mg Oral Daily With Breakfast Aric Mojica MD   60 mg at 08/02/20 0904   • prochlorperazine (COMPAZINE) injection 5 mg  5 mg Intravenous Q6H PRN Sophia Pennington APRN   5 mg at 08/02/20 0300   • promethazine (PHENERGAN) tablet 25 mg  25 mg Oral Q6H PRN Sophia Pennington APRN   25 mg at 08/01/20 2336   • senna (SENOKOT) tablet 2 tablet  2 tablet Oral BID Steffen Meadows MD   2 tablet at 08/02/20 0904   • sodium bicarbonate injection 8.4% 50 mEq  50 mEq Intravenous Once Abril Worthy MD       • sodium chloride 0.9 % flush 10 mL  10 mL Intravenous PRN Natalio Murillo MD       • sodium chloride 0.9 % flush 10 mL  10 mL Intravenous Q12H Sophia Pennington APRN   10 mL at 08/01/20 2153   • sodium chloride 0.9 % flush 10 mL  10 mL Intravenous PRN Sophia Pennington, APRN           Assessment/Plan   - JACKI on CKD III:  prerenal azotemia due to severe hypercalcemia; creatinine improved with volume expansion.   - Hypercalcemia: due to sarcoidosis.  Calcium peak 14.4, down to 10.4 this  morning.  Patient refusing some oral medications due to nausea.    - Hypokalemia:  Replaced and this morning up to 6.1.    - Hyponatremia: With rapid correction to 144.    - Sarcoidosis: appreciate pulm input, status post methotrexate.  - Chronic back pain     Plan:  Critical labs reviewed.  Potassium 6.1 (? accurate), rapid correction of sodium.  Bicarb down to 17.  Non-anion gap.  Calcium down to 10.4.  Severe hypomagnesemia.  Will change IV fluid to D5W with 75 mEq of sodium bicarb at 150 cc an hour.  Will replace magnesium IV and recheck BMP at 2 PM.  I asked to be called with results.  Repeat labs in a.m.  35min cc time.      Hypercalcemia    Sarcoidosis of lung (CMS/HCC)    S/P lumbar laminectomy    JACKI (acute kidney injury) (CMS/HCC)    Constipation    Non-intractable vomiting with nausea    Nausea      Abril Worthy MD  08/02/20  09:27

## 2020-08-02 NOTE — PLAN OF CARE
Persistent nausea and and episode of vomiting unrelieved with prn nausea meds. Refused lab work. Agreeable to IVF's w/ KCL at 250/hr and Calcitonin injection. Requested to wait on COVID swab test. Family visited and given information on hypercalcemia. Telemetry SR/ST. Will continue to monitor.  Problem: Patient Care Overview  Goal: Plan of Care Review  Outcome: Ongoing (interventions implemented as appropriate)  Goal: Individualization and Mutuality  Outcome: Ongoing (interventions implemented as appropriate)  Goal: Discharge Needs Assessment  Outcome: Ongoing (interventions implemented as appropriate)  Goal: Interprofessional Rounds/Family Conf  Outcome: Ongoing (interventions implemented as appropriate)     Problem: Patient Care Overview  Goal: Plan of Care Review  Outcome: Ongoing (interventions implemented as appropriate)  Goal: Individualization and Mutuality  Outcome: Ongoing (interventions implemented as appropriate)  Goal: Discharge Needs Assessment  Outcome: Ongoing (interventions implemented as appropriate)  Goal: Interprofessional Rounds/Family Conf  Outcome: Ongoing (interventions implemented as appropriate)     Problem: Constipation (Adult)  Goal: Identify Related Risk Factors and Signs and Symptoms  Outcome: Ongoing (interventions implemented as appropriate)  Goal: Effective Bowel Elimination  Outcome: Ongoing (interventions implemented as appropriate)  Goal: Comfort  Outcome: Ongoing (interventions implemented as appropriate)     Problem: Pain, Acute (Adult)  Goal: Identify Related Risk Factors and Signs and Symptoms  Outcome: Ongoing (interventions implemented as appropriate)  Goal: Acceptable Pain Control/Comfort Level  Outcome: Ongoing (interventions implemented as appropriate)     Problem: Nutrition, Imbalanced: Inadequate Oral Intake (Adult)  Goal: Identify Related Risk Factors and Signs and Symptoms  Outcome: Ongoing (interventions implemented as appropriate)  Goal: Improved Oral  Intake  Outcome: Ongoing (interventions implemented as appropriate)  Goal: Prevent Further Weight Loss  Outcome: Ongoing (interventions implemented as appropriate)     Problem: Nausea/Vomiting (Adult)  Goal: Identify Related Risk Factors and Signs and Symptoms  Outcome: Ongoing (interventions implemented as appropriate)  Goal: Symptom Relief  Outcome: Ongoing (interventions implemented as appropriate)  Goal: Adequate Hydration  Outcome: Ongoing (interventions implemented as appropriate)

## 2020-08-02 NOTE — PROGRESS NOTES
Gastroenterology   Inpatient Progress Note    Reason for Follow Up: Nausea and vomiting    Subjective     Interval History:   Patient still nauseated this morning.  He is wanting some water to drink.  The EGD had to be canceled due to hyperkalemia.    Current Facility-Administered Medications:   •  acetaminophen (TYLENOL) tablet 650 mg, 650 mg, Oral, Q4H PRN **OR** acetaminophen (TYLENOL) 160 MG/5ML solution 650 mg, 650 mg, Oral, Q4H PRN **OR** acetaminophen (TYLENOL) suppository 650 mg, 650 mg, Rectal, Q4H PRN, Sophia Pennington APRN  •  bisacodyl (DULCOLAX) suppository 10 mg, 10 mg, Rectal, Daily PRN, Sophia Pennington APRN, 10 mg at 07/30/20 0423  •  dextrose 5 % 925 mL with sodium bicarbonate 8.4 % 75 mEq infusion, , Intravenous, Continuous, Abril Worthy MD  •  docusate sodium (COLACE) capsule 100 mg, 100 mg, Oral, BID, Sophia Pennington APRN, 100 mg at 08/02/20 0904  •  magnesium sulfate in D5W 1g/100mL (PREMIX), 1 g, Intravenous, Q1H, Abril Worthy MD  •  methotrexate tablet 5 mg, 5 mg, Oral, Weekly, Tony Hoang MD, 5 mg at 07/31/20 1910  •  nitroglycerin (NITROSTAT) SL tablet 0.4 mg, 0.4 mg, Sublingual, Q5 Min PRN, Sophia Pennington APRN  •  ondansetron (ZOFRAN) injection 4 mg, 4 mg, Intravenous, Q6H PRN, Levi Warren MD, 4 mg at 08/02/20 0915  •  polyethylene glycol 3350 powder (packet), 17 g, Oral, Daily, Sophia Pennington APRN, 17 g at 08/02/20 0904  •  potassium chloride 10 mEq in 100 mL IVPB, 10 mEq, Intravenous, Q1H PRN, Levi Warren MD, Last Rate: 100 mL/hr at 07/31/20 1052, 10 mEq at 07/31/20 1052  •  predniSONE (DELTASONE) tablet 60 mg, 60 mg, Oral, Daily With Breakfast, Aric Mojica MD, 60 mg at 08/02/20 0904  •  prochlorperazine (COMPAZINE) injection 5 mg, 5 mg, Intravenous, Q6H PRN, Sophia Pennington APRN, 5 mg at 08/02/20 0300  •  promethazine (PHENERGAN) tablet 25 mg, 25 mg, Oral, Q6H PRN, Sophia Pennington APRN, 25 mg at  08/01/20 2336  •  senna (SENOKOT) tablet 2 tablet, 2 tablet, Oral, BID, Steffen Meadows MD, 2 tablet at 08/02/20 0904  •  sodium bicarbonate injection 8.4% 50 mEq, 50 mEq, Intravenous, Once, Abril Worthy MD  •  [COMPLETED] Insert peripheral IV, , , Once **AND** sodium chloride 0.9 % flush 10 mL, 10 mL, Intravenous, PRN, Natalio Murillo MD  •  sodium chloride 0.9 % flush 10 mL, 10 mL, Intravenous, Q12H, Sophia Pennington APRN, 10 mL at 08/01/20 2153  •  sodium chloride 0.9 % flush 10 mL, 10 mL, Intravenous, PRN, Sophia Pennington APRN  Review of Systems:    The following systems were reviewed and negative;  constitution    Objective     Vital Signs  Temp:  [97 °F (36.1 °C)-98.9 °F (37.2 °C)] 98.1 °F (36.7 °C)  Heart Rate:  [71-93] 71  Resp:  [16-18] 18  BP: (141-164)/() 141/90  Body mass index is 20.87 kg/m².    Intake/Output Summary (Last 24 hours) at 8/2/2020 1002  Last data filed at 8/2/2020 0757  Gross per 24 hour   Intake --   Output 4625 ml   Net -4625 ml     I/O this shift:  In: -   Out: 1075 [Urine:1075]     Physical Exam:   General: patient awake, alert and cooperative   Eyes: no scleral icterus   Skin: warm and dry, not jaundiced   Abdomen: soft, nontender, nondistended; normal bowel sounds, no masses palpated, no periumbical lymphadenopathy   Psychiatric: Appropriate affect and behavior     Results Review:     I reviewed the patient's new clinical results.    Results from last 7 days   Lab Units 08/02/20  0551 07/31/20  0716 07/30/20  0544   WBC 10*3/mm3 8.36 7.38 6.28   HEMOGLOBIN g/dL 10.0* 12.4* 11.7*   HEMATOCRIT % 29.2* 36.1* 34.7*   PLATELETS 10*3/mm3 280 295 274     Results from last 7 days   Lab Units 08/02/20  0551 07/31/20  1037 07/30/20  0544 07/30/20  0019   SODIUM mmol/L 144 133* 133* 133*   POTASSIUM mmol/L 6.1* 3.2* 3.0* 3.7   CHLORIDE mmol/L 118* 96* 102 96*   CO2 mmol/L 17.5* 25.4 24.0 26.8   BUN mg/dL 8 12 16 19   CREATININE mg/dL 1.12 1.79* 1.52* 1.80*    CALCIUM mg/dL 10.4 14.2* 12.0* 14.4*   BILIRUBIN mg/dL  --   --  0.5 0.7   ALK PHOS U/L  --   --  63 83   ALT (SGPT) U/L  --   --  10 13   AST (SGOT) U/L  --   --  12 14   GLUCOSE mg/dL 75 92 95 107*         Lab Results   Lab Value Date/Time    LIPASE 13 07/29/2020 2340       Radiology:  XR Abdomen KUB   Final Result       As described.       This report was finalized on 8/1/2020 4:10 PM by Dr. Vinod Stauffer M.D.          XR Spine Lumbar 2 or 3 View   Final Result      CT Abdomen Pelvis With Contrast   Final Result       1. Increased stool burden seen within the rectosigmoid, in keeping with   history of constipation.   2. Worsening airspace consolidation noted at the lung bases bilaterally.   Correlation with any evidence of pneumonia is recommended.   3. Paraspinous fluid collection. This was also present on prior exam,   although it appears smaller on current study. See description above.       Radiation dose reduction techniques were utilized, including automated   exposure control and exposure modulation based on body size.       This report was finalized on 7/30/2020 1:46 AM by Dr. Padmaja Owusu M.D.          XR Abdomen KUB   Final Result   Increased stool burden within the rectal vault, in keeping with history   of constipation. Extensive bilateral pulmonary infiltrates are again   seen.       This report was finalized on 7/29/2020 9:39 PM by Dr. Padmaja Owusu M.D.              Assessment/Plan     Patient Active Problem List   Diagnosis   • Dyspnea   • Lung nodules   • Sarcoidosis of lung (CMS/HCC)   • S/P lumbar laminectomy   • Hypercalcemia   • JACKI (acute kidney injury) (CMS/HCC)   • Constipation   • Non-intractable vomiting with nausea   • Nausea       Assessment:  1. Nausea and vomiting.  Patient has Zofran and Phenergan ordered as needed.  EGD canceled today due to hyperkalemia  2. Pulmonary sarcoidosis.  Stable.  Status post methotrexate.  3. Constipation.  Stable.  This is likely  due to hypercalcemia.  4. Chronic kidney disease stage III.  5. Hyperkalemia.  Plans for correction per renal.      Plan:  · Correction of electrolytes per renal.  · Plan EGD possibly tomorrow if electrolytes improved.  · Continue Zofran and Phenergan.  · Continue MiraLAX and Senokot for constipation.  I discussed the patients findings and my recommendations with patient and nursing staff.    MD Rick Oropeza M.D.  [facility] [place] [address]  Office: [phone]

## 2020-08-02 NOTE — PROGRESS NOTES
"  Daily Progress Note.   [address]  8/2/2020    Patient:  Name:  Bonifacio Lewis  MRN:  [id number]  1970  50 y.o.  male         CC: hypercalcemia    Interval History:  Follow up sarcoidosis, hypercalcemia  abd pain, nausea emesis  No worsening soa    ROS: No fever, no diarrhea, no chest pain  PMFSSH: no change    Physical Exam:  /90 (BP Location: Right arm, Patient Position: Lying)   Pulse 71   Temp 98.1 °F (36.7 °C) (Oral)   Resp 18   Ht 198.1 cm (77.99\")   Wt 81.9 kg (180 lb 9.6 oz)   SpO2 98%   BMI 20.87 kg/m²   Body mass index is 20.87 kg/m².    Intake/Output Summary (Last 24 hours) at 8/2/2020 1104  Last data filed at 8/2/2020 0757  Gross per 24 hour   Intake --   Output 4125 ml   Net -4125 ml     General appearance: ill appearing in discomfort holding his abd conversant   Eyes: anicteric sclerae, moist conjunctivae; no lid-lag; PERRLA  HENT: Atraumatic; oropharynx clear with moist mucous membranes and no mucosal ulcerations; normal hard and soft palate  Neck: Trachea midline; FROM, supple, will not allow palpatoin  Lungs: CTA, with normal respiratory effort and no intercostal retractions  CV: RRR, no MRGs   Abdomen: Soft, non-tender; no masses or HSM  Extremities: No peripheral edema or extremity lymphadenopathy  Skin: Normal temperature, turgor and texture; no rash, ulcers or subcutaneous nodules  Psych: agitated affect, alert and oriented to person place    Data Review:  Notable Labs:  Results from last 7 days   Lab Units 08/02/20  0551 07/31/20  0716 07/30/20  0544 07/30/20  0019   WBC 10*3/mm3 8.36 7.38 6.28 8.51   HEMOGLOBIN g/dL 10.0* 12.4* 11.7* 14.3   PLATELETS 10*3/mm3 280 295 274 326     Results from last 7 days   Lab Units 08/02/20  0551 07/31/20  1037 07/30/20  0544 07/30/20  0019   SODIUM mmol/L 144 133* 133* 133*   POTASSIUM mmol/L 6.1* 3.2* 3.0* 3.7   CHLORIDE mmol/L 118* 96* 102 96*   CO2 mmol/L 17.5* 25.4 24.0 26.8   BUN mg/dL 8 12 16 19   CREATININE " mg/dL 1.12 1.79* 1.52* 1.80*   GLUCOSE mg/dL 75 92 95 107*   CALCIUM mg/dL 10.4 14.2* 12.0* 14.4*   MAGNESIUM mg/dL 0.9*  --   --   --    PHOSPHORUS mg/dL 2.4*  --   --   --    Estimated Creatinine Clearance: 92.4 mL/min (by C-G formula based on SCr of 1.12 mg/dL).    Results from last 7 days   Lab Units 08/02/20  0551 07/31/20  0716 07/30/20  0544 07/30/20  0019   AST (SGOT) U/L  --   --  12 14   ALT (SGPT) U/L  --   --  10 13   PLATELETS 10*3/mm3 280 295 274 326             Imaging:  Reviewed chest images personally from past 3 days    Scheduled meds:      docusate sodium 100 mg Oral BID   magnesium sulfate 1 g Intravenous Q1H   methotrexate 5 mg Oral Weekly   polyethylene glycol 17 g Oral Daily   predniSONE 60 mg Oral Daily With Breakfast   senna 2 tablet Oral BID   sodium bicarbonate 50 mEq Intravenous Once   sodium chloride 10 mL Intravenous Q12H       ASSESSMENT  /  PLAN:  1. Sarcoidosis with significant lung involvement and with hypercalcemia  2. Acute kidney injury  3. Constipation with abdominal distention and discomfort  4. Nausea and vomiting       Will hold mtx until gi issues improve  Cont pred 60 daily  Repeat ct chest when paitent able to lay still  Gi working up n/v    Follow up with Dr Paniagua two weeks.         David Luna MD  [facility]  08/02/20  1243 PM

## 2020-08-02 NOTE — CONSULTS
Psychiatry has been consulted due to patient refusing treatment and to evaluate for decisional capacity.  He has no history of mental illness.  Apparently, he had refused to have his blood drawn x5 yesterday.  There have been some difficulties with his IV.  Nursing noted last night that he had requested to wait on his COVID swab test before having additional treatment.  Last night, his COVID test came back negative.    I spoke with the patient's nurse today.  He reports the patient has been fully compliant today.  He has not refused any treatment.  His a.m. labs were collected and are abnormal.  There do not appear to be any further concerns for the patient's decisional capacity.  If additional concerns should arise please reconsult psychiatry.

## 2020-08-03 LAB
ALBUMIN SERPL-MCNC: 3.9 G/DL (ref 3.5–5.2)
ANION GAP SERPL CALCULATED.3IONS-SCNC: 10.7 MMOL/L (ref 5–15)
BASOPHILS # BLD AUTO: 0.04 10*3/MM3 (ref 0–0.2)
BASOPHILS NFR BLD AUTO: 0.3 % (ref 0–1.5)
BUN SERPL-MCNC: 12 MG/DL (ref 6–20)
BUN/CREAT SERPL: 8.3 (ref 7–25)
CALCIUM SPEC-SCNC: 13.5 MG/DL (ref 8.6–10.5)
CHLORIDE SERPL-SCNC: 100 MMOL/L (ref 98–107)
CO2 SERPL-SCNC: 21.3 MMOL/L (ref 22–29)
CREAT SERPL-MCNC: 1.45 MG/DL (ref 0.76–1.27)
DEPRECATED RDW RBC AUTO: 41.5 FL (ref 37–54)
EOSINOPHIL # BLD AUTO: 0.01 10*3/MM3 (ref 0–0.4)
EOSINOPHIL NFR BLD AUTO: 0.1 % (ref 0.3–6.2)
ERYTHROCYTE [DISTWIDTH] IN BLOOD BY AUTOMATED COUNT: 13.7 % (ref 12.3–15.4)
GFR SERPL CREATININE-BSD FRML MDRD: 63 ML/MIN/1.73
GLUCOSE SERPL-MCNC: 106 MG/DL (ref 65–99)
HCT VFR BLD AUTO: 35.9 % (ref 37.5–51)
HGB BLD-MCNC: 12.7 G/DL (ref 13–17.7)
IMM GRANULOCYTES # BLD AUTO: 0.11 10*3/MM3 (ref 0–0.05)
IMM GRANULOCYTES NFR BLD AUTO: 0.8 % (ref 0–0.5)
LYMPHOCYTES # BLD AUTO: 1.64 10*3/MM3 (ref 0.7–3.1)
LYMPHOCYTES NFR BLD AUTO: 12.1 % (ref 19.6–45.3)
MAGNESIUM SERPL-MCNC: 1.5 MG/DL (ref 1.6–2.6)
MCH RBC QN AUTO: 29.3 PG (ref 26.6–33)
MCHC RBC AUTO-ENTMCNC: 35.4 G/DL (ref 31.5–35.7)
MCV RBC AUTO: 82.9 FL (ref 79–97)
MONOCYTES # BLD AUTO: 1.07 10*3/MM3 (ref 0.1–0.9)
MONOCYTES NFR BLD AUTO: 7.9 % (ref 5–12)
NEUTROPHILS NFR BLD AUTO: 10.66 10*3/MM3 (ref 1.7–7)
NEUTROPHILS NFR BLD AUTO: 78.8 % (ref 42.7–76)
NRBC BLD AUTO-RTO: 0.1 /100 WBC (ref 0–0.2)
PHOSPHATE SERPL-MCNC: 2.5 MG/DL (ref 2.5–4.5)
PLATELET # BLD AUTO: 364 10*3/MM3 (ref 140–450)
PMV BLD AUTO: 9.7 FL (ref 6–12)
POTASSIUM SERPL-SCNC: 2.7 MMOL/L (ref 3.5–5.2)
RBC # BLD AUTO: 4.33 10*6/MM3 (ref 4.14–5.8)
SODIUM SERPL-SCNC: 132 MMOL/L (ref 136–145)
WBC # BLD AUTO: 13.53 10*3/MM3 (ref 3.4–10.8)

## 2020-08-03 PROCEDURE — 99232 SBSQ HOSP IP/OBS MODERATE 35: CPT | Performed by: INTERNAL MEDICINE

## 2020-08-03 PROCEDURE — 25010000002 MAGNESIUM SULFATE IN D5W 1G/100ML (PREMIX) 1-5 GM/100ML-% SOLUTION: Performed by: INTERNAL MEDICINE

## 2020-08-03 PROCEDURE — 85025 COMPLETE CBC W/AUTO DIFF WBC: CPT | Performed by: INTERNAL MEDICINE

## 2020-08-03 PROCEDURE — 63710000001 PREDNISONE PER 5 MG: Performed by: INTERNAL MEDICINE

## 2020-08-03 PROCEDURE — 25010000002 ONDANSETRON PER 1 MG: Performed by: INTERNAL MEDICINE

## 2020-08-03 PROCEDURE — 25010000003 POTASSIUM CHLORIDE 10 MEQ/100ML SOLUTION: Performed by: INTERNAL MEDICINE

## 2020-08-03 PROCEDURE — 83735 ASSAY OF MAGNESIUM: CPT | Performed by: INTERNAL MEDICINE

## 2020-08-03 PROCEDURE — 25010000002 PAMIDRONATE DISODIUM PER 30 MG: Performed by: INTERNAL MEDICINE

## 2020-08-03 PROCEDURE — 25010000002 PROCHLORPERAZINE 10 MG/2ML SOLUTION: Performed by: NURSE PRACTITIONER

## 2020-08-03 PROCEDURE — 80069 RENAL FUNCTION PANEL: CPT | Performed by: INTERNAL MEDICINE

## 2020-08-03 RX ORDER — FAMOTIDINE 10 MG/ML
20 INJECTION, SOLUTION INTRAVENOUS EVERY 12 HOURS SCHEDULED
Status: DISCONTINUED | OUTPATIENT
Start: 2020-08-03 | End: 2020-08-05 | Stop reason: ALTCHOICE

## 2020-08-03 RX ORDER — MAGNESIUM SULFATE 1 G/100ML
1 INJECTION INTRAVENOUS
Status: COMPLETED | OUTPATIENT
Start: 2020-08-03 | End: 2020-08-03

## 2020-08-03 RX ORDER — SODIUM CHLORIDE 9 MG/ML
125 INJECTION, SOLUTION INTRAVENOUS CONTINUOUS
Status: DISCONTINUED | OUTPATIENT
Start: 2020-08-03 | End: 2020-08-06 | Stop reason: HOSPADM

## 2020-08-03 RX ORDER — SUCRALFATE 1 G/1
1 TABLET ORAL
Status: DISCONTINUED | OUTPATIENT
Start: 2020-08-03 | End: 2020-08-06 | Stop reason: HOSPADM

## 2020-08-03 RX ORDER — POTASSIUM CHLORIDE 750 MG/1
40 CAPSULE, EXTENDED RELEASE ORAL
Status: COMPLETED | OUTPATIENT
Start: 2020-08-03 | End: 2020-08-03

## 2020-08-03 RX ADMIN — MAGNESIUM SULFATE 1 G: 1 INJECTION INTRAVENOUS at 17:21

## 2020-08-03 RX ADMIN — PREDNISONE 60 MG: 50 TABLET ORAL at 08:40

## 2020-08-03 RX ADMIN — PAMIDRONATE DISODIUM 30 MG: 3 INJECTION INTRAVENOUS at 17:05

## 2020-08-03 RX ADMIN — MAGNESIUM SULFATE 1 G: 1 INJECTION INTRAVENOUS at 18:32

## 2020-08-03 RX ADMIN — ONDANSETRON 4 MG: 2 INJECTION INTRAMUSCULAR; INTRAVENOUS at 03:16

## 2020-08-03 RX ADMIN — POTASSIUM CHLORIDE 10 MEQ: 7.46 INJECTION, SOLUTION INTRAVENOUS at 14:21

## 2020-08-03 RX ADMIN — SUCRALFATE 1 G: 1 TABLET ORAL at 20:25

## 2020-08-03 RX ADMIN — POTASSIUM CHLORIDE 40 MEQ: 10 CAPSULE, COATED, EXTENDED RELEASE ORAL at 20:25

## 2020-08-03 RX ADMIN — POTASSIUM CHLORIDE 10 MEQ: 7.46 INJECTION, SOLUTION INTRAVENOUS at 15:53

## 2020-08-03 RX ADMIN — SODIUM CHLORIDE 125 ML/HR: 9 INJECTION, SOLUTION INTRAVENOUS at 16:16

## 2020-08-03 RX ADMIN — POTASSIUM CHLORIDE 10 MEQ: 7.46 INJECTION, SOLUTION INTRAVENOUS at 13:20

## 2020-08-03 RX ADMIN — SODIUM CHLORIDE 75 ML/HR: 4.5 INJECTION, SOLUTION INTRAVENOUS at 08:46

## 2020-08-03 RX ADMIN — SODIUM CHLORIDE, PRESERVATIVE FREE 10 ML: 5 INJECTION INTRAVENOUS at 08:41

## 2020-08-03 RX ADMIN — SODIUM CHLORIDE, PRESERVATIVE FREE 10 ML: 5 INJECTION INTRAVENOUS at 20:26

## 2020-08-03 RX ADMIN — POTASSIUM CHLORIDE 10 MEQ: 7.46 INJECTION, SOLUTION INTRAVENOUS at 10:31

## 2020-08-03 RX ADMIN — POTASSIUM CHLORIDE 40 MEQ: 10 CAPSULE, COATED, EXTENDED RELEASE ORAL at 17:27

## 2020-08-03 RX ADMIN — POTASSIUM CHLORIDE 10 MEQ: 7.46 INJECTION, SOLUTION INTRAVENOUS at 09:42

## 2020-08-03 RX ADMIN — POTASSIUM CHLORIDE 40 MEQ: 10 CAPSULE, COATED, EXTENDED RELEASE ORAL at 22:31

## 2020-08-03 RX ADMIN — FAMOTIDINE 20 MG: 10 INJECTION INTRAVENOUS at 20:25

## 2020-08-03 RX ADMIN — POTASSIUM CHLORIDE 10 MEQ: 7.46 INJECTION, SOLUTION INTRAVENOUS at 11:36

## 2020-08-03 RX ADMIN — SUCRALFATE 1 G: 1 TABLET ORAL at 17:30

## 2020-08-03 RX ADMIN — ONDANSETRON 4 MG: 2 INJECTION INTRAMUSCULAR; INTRAVENOUS at 11:53

## 2020-08-03 RX ADMIN — PROCHLORPERAZINE EDISYLATE 5 MG: 5 INJECTION INTRAMUSCULAR; INTRAVENOUS at 14:28

## 2020-08-03 RX ADMIN — MAGNESIUM SULFATE 1 G: 1 INJECTION INTRAVENOUS at 16:16

## 2020-08-03 NOTE — PROGRESS NOTES
Name: Bonifacio Lewis ADMIT: 2020   : 1970  PCP: Vinod Reyna MD    MRN: [id number] LOS: 4 days   AGE/SEX: 50 y.o. male  ROOM: [facility]     Subjective   Subjective   CC: nausea  No acute events. Patient is still having nausea and general malaise but no vomiting today. No f/c/d. Had large BM x2 today.     Objective   Objective   Vital Signs  Temp:  [97.1 °F (36.2 °C)-98.2 °F (36.8 °C)] 98 °F (36.7 °C)  Heart Rate:  [66-88] 66  Resp:  [18-20] 18  BP: (125-143)/(75-92) 143/86  SpO2:  [95 %-97 %] 95 %  on   ;   Device (Oxygen Therapy): room air  Body mass index is 20.87 kg/m².  Physical Exam   Constitutional: He is oriented to person, place, and time. No distress.   HENT:   Head: Normocephalic and atraumatic.   Mouth/Throat: Oropharynx is clear and moist.   Eyes: Pupils are equal, round, and reactive to light. Conjunctivae and EOM are normal.   Neck: Normal range of motion. Neck supple. No JVD present.   Cardiovascular: Normal rate, regular rhythm and intact distal pulses.   Pulmonary/Chest: Effort normal and breath sounds normal.   Abdominal: Soft. Bowel sounds are normal.   Musculoskeletal: He exhibits no edema or tenderness.   Neurological: He is alert and oriented to person, place, and time.   Skin: Skin is warm and dry. Capillary refill takes less than 2 seconds. He is not diaphoretic.   Psychiatric: He has a normal mood and affect. His behavior is normal.   Nursing note and vitals reviewed.    Results Review:       I reviewed the patient's new clinical results.  I reviewed the patient's telemetry.  Results from last 7 days   Lab Units 20  0730 20  0551 20  0716 20  0544   WBC 10*3/mm3 13.53* 8.36 7.38 6.28   HEMOGLOBIN g/dL 12.7* 10.0* 12.4* 11.7*   PLATELETS 10*3/mm3 364 280 295 274     Results from last 7 days   Lab Units 20  0730 20  1408 20  0551 20  1037   SODIUM mmol/L 132* 141 144 133*   POTASSIUM mmol/L 2.7* 3.1* 6.1* 3.2*   CHLORIDE mmol/L  100 108* 118* 96*   CO2 mmol/L 21.3* 18.3* 17.5* 25.4   BUN mg/dL 12 9 8 12   CREATININE mg/dL 1.45* 1.35* 1.12 1.79*   GLUCOSE mg/dL 106* 100* 75 92   Estimated Creatinine Clearance: 71.4 mL/min (A) (by C-G formula based on SCr of 1.45 mg/dL (H)).  Results from last 7 days   Lab Units 08/03/20  0730 08/02/20  0551 07/30/20  0544 07/30/20  0019   ALBUMIN g/dL 3.90 3.20* 3.00* 3.80   BILIRUBIN mg/dL  --   --  0.5 0.7   ALK PHOS U/L  --   --  63 83   AST (SGOT) U/L  --   --  12 14   ALT (SGPT) U/L  --   --  10 13     Results from last 7 days   Lab Units 08/03/20  0730 08/02/20  1408 08/02/20  0551 07/31/20  1037 07/30/20  0544 07/30/20  0019   CALCIUM mg/dL 13.5* 12.8* 10.4 14.2* 12.0* 14.4*   ALBUMIN g/dL 3.90  --  3.20*  --  3.00* 3.80   MAGNESIUM mg/dL 1.5* 1.3* 0.9*  --   --   --    PHOSPHORUS mg/dL 2.5 2.5 2.4*  --   --   --        Results from last 7 days   Lab Units 08/01/20  2208   COVID19  Not Detected       No results found for: HGBA1C, POCGLU    XR Abdomen KUB  Narrative: XR ABDOMEN KUB-     INDICATIONS: Nausea, vomiting, constipation     TECHNIQUE: Supine views of the abdomen     COMPARISON:  image from CT from 07/30/2020     FINDINGS:      The bowel gas pattern is nonobstructive. No supine evidence for free  intraperitoneal gas. Amount of stool in the rectosigmoid colon appears  decreased. No definite nephrolithiasis. Follow-up as indications  persist.     Persistent infiltrates in the bilateral lower lungs.           Impression:    As described.     This report was finalized on 8/1/2020 4:10 PM by Dr. Vinod Stauffer M.D.           docusate sodium 100 mg Oral BID   famotidine 20 mg Intravenous Q12H   magnesium sulfate 1 g Intravenous Q1H   pamidronate (AREDIA) IVPB 30 mg Intravenous Once   polyethylene glycol 17 g Oral Daily   potassium chloride 40 mEq Oral Q2H   predniSONE 60 mg Oral Daily With Breakfast   senna 2 tablet Oral BID   sodium chloride 10 mL Intravenous Q12H   sucralfate 1 g Oral 4x  Daily AC & at Bedtime       sodium chloride 125 mL/hr Last Rate: 125 mL/hr (08/03/20 1616)   Diet Clear Liquid       Assessment/Plan     Active Hospital Problems    Diagnosis  POA   • **Hypercalcemia [E83.52]  Yes   • JACKI (acute kidney injury) (CMS/HCC) [N17.9]  Yes   • Constipation [K59.00]  Yes   • Non-intractable vomiting with nausea [R11.2]  Yes   • Nausea [R11.0]  Unknown   • 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   Hypercalcemia  - due to sarcoidosis and the source of most of his symptoms  - slow to respond but treatment has been delayed due to poor patient compliance  - continue on IVF  - calcitonin changed to pamindronate  - appreciate nephrology recs     Constipation/Nausea  - constipation resolved resolved, continue on bowel regimen  - appreciate GI recs, planning EGD when electrolytes are improved but in the mean time continue pepcid and carafate     JACKI  - probably due to volume depletion/hypercalcemia  - improved with IVF initially but serum creatinine is back up-IVF increased     Sarcoidosis  - pulmonary status appears stable  - continue on current dose of prednisone  - holding methotrexate until GI issues improve  - appreciate pulmonology recs, f/u with Dr. Paniagua in 2 weeks     S/P lumbar Laminectomy  - Dr. Bustillo has evaluated, appreciate recs-continue brace and f/u with him in 2 months    SCDs for DVT prophylaxis.  Full code.  Discussed with patient and nursing staff.  Anticipate discharge TBD.  timing yet to be determined.      Steffen Meadows MD  [facility]  08/03/20  17:34    I wore protective equipment throughout this patient encounter including a face mask, gloves and protective eyewear.  Hand hygiene was performed before donning protective equipment and after removal when leaving the room.

## 2020-08-03 NOTE — PLAN OF CARE
Problem: Patient Care Overview  Goal: Plan of Care Review  Outcome: Ongoing (interventions implemented as appropriate)  Flowsheets (Taken 8/3/2020 3904)  Outcome Summary: Pt is currently NPO.  Potassium being replaced.  VSS.  Nausea medication given as needed.  Awaiting plans from GI and renal.  Will continue to monitor patient.  Goal: Individualization and Mutuality  Outcome: Ongoing (interventions implemented as appropriate)  Goal: Discharge Needs Assessment  Outcome: Ongoing (interventions implemented as appropriate)  Goal: Interprofessional Rounds/Family Conf  Outcome: Ongoing (interventions implemented as appropriate)     Problem: Constipation (Adult)  Goal: Effective Bowel Elimination  Outcome: Ongoing (interventions implemented as appropriate)  Goal: Comfort  Outcome: Ongoing (interventions implemented as appropriate)     Problem: Pain, Acute (Adult)  Goal: Acceptable Pain Control/Comfort Level  Outcome: Ongoing (interventions implemented as appropriate)     Problem: Nutrition, Imbalanced: Inadequate Oral Intake (Adult)  Goal: Improved Oral Intake  Outcome: Ongoing (interventions implemented as appropriate)  Goal: Prevent Further Weight Loss  Outcome: Ongoing (interventions implemented as appropriate)     Problem: Nausea/Vomiting (Adult)  Goal: Symptom Relief  Outcome: Ongoing (interventions implemented as appropriate)  Goal: Adequate Hydration  Outcome: Ongoing (interventions implemented as appropriate)     Problem: Skin Injury Risk (Adult)  Goal: Skin Health and Integrity  Outcome: Ongoing (interventions implemented as appropriate)

## 2020-08-03 NOTE — PROGRESS NOTES
Continued Stay Note  [facility]     Patient Name: Bonifacio Lewis  MRN: [id number]  Today's Date: 8/3/2020    Admit Date: 7/29/2020    Discharge Plan     Row Name 08/03/20 1148       Plan    Plan  Plan home with family.   ALLEN Castro RN    Patient/Family in Agreement with Plan  yes    Plan Comments  Spoke with pt at bedside.  Pt denies any discharge needs at this time.  Pt has stated family can transport home at discharge.  Plan home with family.  ALLEN Castro RN        Discharge Codes    No documentation.             Ebony Castro RN

## 2020-08-03 NOTE — PROGRESS NOTES
"   LOS: 4 days    Patient Care Team:  Vinod Reyna MD as PCP - General (Internal Medicine)    Chief Complaint:    Chief Complaint   Patient presents with   • Constipation   • Nausea     Follow-up acute kidney injury on chronic kidney disease and severe hypercalcemia  Subjective     Interval History:   The patient is feeling the same, denies any chest pain or shortness of air, no orthopnea or PND, has significant thirst.  And he is weak no gross hematuria    Objective     Vital Signs  Temp:  [97.1 °F (36.2 °C)-98.2 °F (36.8 °C)] 98 °F (36.7 °C)  Heart Rate:  [66-88] 66  Resp:  [18-20] 18  BP: (125-143)/(75-92) 143/86    Flowsheet Rows      First Filed Value   Admission Height  198.1 cm (78\") Documented at 07/29/2020 2005   Admission Weight  82.7 kg (182 lb 6.4 oz) Documented at 07/29/2020 2237          I/O this shift:  In: 100 [IV Piggyback:100]  Out: 500 [Urine:500]  I/O last 3 completed shifts:  In: 720 [P.O.:720]  Out: 4950 [Urine:4950]    Intake/Output Summary (Last 24 hours) at 8/3/2020 1530  Last data filed at 8/3/2020 1500  Gross per 24 hour   Intake 820 ml   Output 2575 ml   Net -1755 ml       Physical Exam:  General Appearance: alert, oriented x 3, no acute distress,   Skin: warm and dry  HEENT: pupils round and reactive to light, oral mucosa  Neck: supple, no JVD, trachea midline  Lungs: CTA, unlabored breathing effort  Heart: RRR, normal S1 and S2, no S3, no rub  Abdomen: soft, nontender,  present bowel sounds to auscultation  : no palpable bladder,  Extremities: no edema, cyanosis or clubbing  Neuro: normal speech and mental status         Results Review:    Results from last 7 days   Lab Units 08/03/20  0730 08/02/20  1408 08/02/20  0551  07/30/20  0544 07/30/20  0019   SODIUM mmol/L 132* 141 144   < > 133* 133*   POTASSIUM mmol/L 2.7* 3.1* 6.1*   < > 3.0* 3.7   CHLORIDE mmol/L 100 108* 118*   < > 102 96*   CO2 mmol/L 21.3* 18.3* 17.5*   < > 24.0 26.8   BUN mg/dL 12 9 8   < > 16 19   CREATININE " mg/dL 1.45* 1.35* 1.12   < > 1.52* 1.80*   CALCIUM mg/dL 13.5* 12.8* 10.4   < > 12.0* 14.4*   BILIRUBIN mg/dL  --   --   --   --  0.5 0.7   ALK PHOS U/L  --   --   --   --  63 83   ALT (SGPT) U/L  --   --   --   --  10 13   AST (SGOT) U/L  --   --   --   --  12 14   GLUCOSE mg/dL 106* 100* 75   < > 95 107*    < > = values in this interval not displayed.       Estimated Creatinine Clearance: 71.4 mL/min (A) (by C-G formula based on SCr of 1.45 mg/dL (H)).    Results from last 7 days   Lab Units 08/03/20  0730 08/02/20  1408 08/02/20  0551   MAGNESIUM mg/dL 1.5* 1.3* 0.9*   PHOSPHORUS mg/dL 2.5 2.5 2.4*             Results from last 7 days   Lab Units 08/03/20  0730 08/02/20  0551 07/31/20  0716 07/30/20  0544 07/30/20  0019   WBC 10*3/mm3 13.53* 8.36 7.38 6.28 8.51   HEMOGLOBIN g/dL 12.7* 10.0* 12.4* 11.7* 14.3   PLATELETS 10*3/mm3 364 280 295 274 326               Imaging Results (Last 24 Hours)     ** No results found for the last 24 hours. **          docusate sodium 100 mg Oral BID   famotidine 20 mg Intravenous Q12H   polyethylene glycol 17 g Oral Daily   predniSONE 60 mg Oral Daily With Breakfast   senna 2 tablet Oral BID   sodium chloride 10 mL Intravenous Q12H   sucralfate 1 g Oral 4x Daily AC & at Bedtime       sodium chloride 75 mL/hr Last Rate: 75 mL/hr (08/03/20 0846)       Medication Review:   Current Facility-Administered Medications   Medication Dose Route Frequency Provider Last Rate Last Dose   • acetaminophen (TYLENOL) tablet 650 mg  650 mg Oral Q4H PRN Sophia Pennington APRN        Or   • acetaminophen (TYLENOL) 160 MG/5ML solution 650 mg  650 mg Oral Q4H PRN Sophia Pennington APRN        Or   • acetaminophen (TYLENOL) suppository 650 mg  650 mg Rectal Q4H PRN Sophia Pennington APRN       • bisacodyl (DULCOLAX) suppository 10 mg  10 mg Rectal Daily PRN Sophia Pennington APRN   10 mg at 07/30/20 0423   • docusate sodium (COLACE) capsule 100 mg  100 mg Oral BID Sophia Pennington, APRN    100 mg at 08/02/20 2129   • famotidine (PEPCID) injection 20 mg  20 mg Intravenous Q12H Mart Morgan MD       • nitroglycerin (NITROSTAT) SL tablet 0.4 mg  0.4 mg Sublingual Q5 Min PRN Sophia Pennington APRN       • ondansetron (ZOFRAN) injection 4 mg  4 mg Intravenous Q6H PRN Levi Warren MD   4 mg at 08/03/20 1153   • polyethylene glycol 3350 powder (packet)  17 g Oral Daily Sophia Pennington APRN   17 g at 08/02/20 0904   • potassium chloride 10 mEq in 100 mL IVPB  10 mEq Intravenous Q1H PRN Levi Warren  mL/hr at 08/03/20 1421 10 mEq at 08/03/20 1421   • predniSONE (DELTASONE) tablet 60 mg  60 mg Oral Daily With Breakfast Aric Mojica MD   60 mg at 08/03/20 0840   • prochlorperazine (COMPAZINE) injection 5 mg  5 mg Intravenous Q6H PRN Sophia Pennington APRN   5 mg at 08/03/20 1428   • promethazine (PHENERGAN) tablet 25 mg  25 mg Oral Q6H PRN Sophia Pennington APRN   25 mg at 08/02/20 1738   • senna (SENOKOT) tablet 2 tablet  2 tablet Oral BID Steffen Meadows MD   2 tablet at 08/02/20 2129   • sodium chloride 0.45 % infusion  75 mL/hr Intravenous Continuous Nader Christensen MD 75 mL/hr at 08/03/20 0846 75 mL/hr at 08/03/20 0846   • sodium chloride 0.9 % flush 10 mL  10 mL Intravenous PRN Natalio Murillo MD       • sodium chloride 0.9 % flush 10 mL  10 mL Intravenous Q12H Sophia Pennington APRN   10 mL at 08/03/20 0841   • sodium chloride 0.9 % flush 10 mL  10 mL Intravenous PRN Sophia Pennington APRN       • sucralfate (CARAFATE) tablet 1 g  1 g Oral 4x Daily AC & at Bedtime Mart Morgan MD           Assessment/Plan    1.  Acute kidney injury on chronic kidney disease stage IV associated with severe hypercalcemia, creatinine 1.45 slightly increased and the calcium is increased up to 13.5 he is currently given IV fluid has not been very effective  2.  Hypercalcemia associated with sarcoidosis calcium initially was 14.4 down to 10.4 and  today up to 13.5  3.  Hypokalemia potassium today 2.7, associated with hypokalemia  4.  Hyponatremia associated with hypotonic solutions.  5.  Sarcoidosis  6.  Chronic back pain      Plan:  1.  Change IV fluid to normal saline to run at 125 cc/h  2.  Pamidronate 30 mg today IV x1  3.  Replete potassium and magnesium  4.  Surveillance labs        Robert Salvador MD  08/03/20  15:30

## 2020-08-03 NOTE — PROGRESS NOTES
Name: Bonifacio Lewis ADMIT: 2020   : 1970  PCP: Vinod Reyna MD    MRN: [id number] LOS: 3 days   AGE/SEX: 50 y.o. male  ROOM: [facility]     Subjective   Subjective   CC: nausea  No acute events. Patient is overall feeling better but nausea still persists. No f/c/v/d.     Objective   Objective   Vital Signs  Temp:  [97.8 °F (36.6 °C)-98.2 °F (36.8 °C)] 98.2 °F (36.8 °C)  Heart Rate:  [65-88] 88  Resp:  [18-20] 20  BP: (129-154)/(72-93) 130/75  SpO2:  [97 %-100 %] 97 %  on   ;   Device (Oxygen Therapy): room air  Body mass index is 20.87 kg/m².  Physical Exam   Constitutional: He is oriented to person, place, and time. No distress.   HENT:   Head: Normocephalic and atraumatic.   Mouth/Throat: Oropharynx is clear and moist.   Eyes: Pupils are equal, round, and reactive to light. Conjunctivae and EOM are normal.   Neck: Normal range of motion. Neck supple. No JVD present.   Cardiovascular: Normal rate, regular rhythm and intact distal pulses.   Pulmonary/Chest: Effort normal and breath sounds normal.   Abdominal: Soft. Bowel sounds are normal.   Musculoskeletal: He exhibits no edema or tenderness.   Neurological: He is alert and oriented to person, place, and time.   Skin: Skin is warm and dry. Capillary refill takes less than 2 seconds. He is not diaphoretic.   Psychiatric: He has a normal mood and affect. His behavior is normal.   Nursing note and vitals reviewed.    Results Review:       I reviewed the patient's new clinical results.  I reviewed the patient's telemetry.  Results from last 7 days   Lab Units 20  0551 20  0716 20  0544 20  0019   WBC 10*3/mm3 8.36 7.38 6.28 8.51   HEMOGLOBIN g/dL 10.0* 12.4* 11.7* 14.3   PLATELETS 10*3/mm3 280 295 274 326     Results from last 7 days   Lab Units 20  1408 20  0551 20  1037 20  0544   SODIUM mmol/L 141 144 133* 133*   POTASSIUM mmol/L 3.1* 6.1* 3.2* 3.0*   CHLORIDE mmol/L 108* 118* 96* 102   CO2 mmol/L  18.3* 17.5* 25.4 24.0   BUN mg/dL 9 8 12 16   CREATININE mg/dL 1.35* 1.12 1.79* 1.52*   GLUCOSE mg/dL 100* 75 92 95   Estimated Creatinine Clearance: 76.7 mL/min (A) (by C-G formula based on SCr of 1.35 mg/dL (H)).  Results from last 7 days   Lab Units 08/02/20  0551 07/30/20  0544 07/30/20  0019   ALBUMIN g/dL 3.20* 3.00* 3.80   BILIRUBIN mg/dL  --  0.5 0.7   ALK PHOS U/L  --  63 83   AST (SGOT) U/L  --  12 14   ALT (SGPT) U/L  --  10 13     Results from last 7 days   Lab Units 08/02/20  1408 08/02/20  0551 07/31/20  1037 07/30/20  0544 07/30/20  0019   CALCIUM mg/dL 12.8* 10.4 14.2* 12.0* 14.4*   ALBUMIN g/dL  --  3.20*  --  3.00* 3.80   MAGNESIUM mg/dL 1.3* 0.9*  --   --   --    PHOSPHORUS mg/dL 2.5 2.4*  --   --   --        Results from last 7 days   Lab Units 08/01/20  2208   COVID19  Not Detected       No results found for: HGBA1C, POCGLU    XR Abdomen KUB  Narrative: XR ABDOMEN KUB-     INDICATIONS: Nausea, vomiting, constipation     TECHNIQUE: Supine views of the abdomen     COMPARISON:  image from CT from 07/30/2020     FINDINGS:      The bowel gas pattern is nonobstructive. No supine evidence for free  intraperitoneal gas. Amount of stool in the rectosigmoid colon appears  decreased. No definite nephrolithiasis. Follow-up as indications  persist.     Persistent infiltrates in the bilateral lower lungs.           Impression:    As described.     This report was finalized on 8/1/2020 4:10 PM by Dr. Vinod Stauffer M.D.           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   Hypercalcemia  - due to sarcoidosis and the source of most of his symptoms  - slow to respond but treatment has been delayed due to poor patient compliance  - continue on IVF and calcitonin  - appreciate nephrology recs     Constipation  - resolved, continue on bowel regimen  - appreciate GI recs, planning EGD possibly tomorrow pending electrolytes     JACKI  - probably due to volume depletion/hypercalcemia  - improved with IVF     Sarcoidosis  - pulmonary status appears stable  - continue on current dose of prednisone  - holding methotrexate until GI issues improve  - appreciate pulmonology recs, f/u with Dr. Paniagua in 2 weeks     S/P lumbar Laminectomy  - Dr. Bustillo has evaluated, appreciate recs-continue brace and f/u with him in 2 months    SCDs for DVT prophylaxis.  Full code.  Discussed with patient and nursing staff.  Anticipate discharge TBD.  timing yet to be determined.      Steffen Meadows MD  [facility]  08/02/20  21:17    Patient was wearing facemask when I entered the room and throughout our encounter.  I wore protective equipment throughout this patient encounter including a face mask, gloves and protective eyewear.  Hand hygiene was performed before donning protective equipment and after removal when leaving the room.

## 2020-08-03 NOTE — PROGRESS NOTES
"  Daily Progress Note.   [address]  8/3/2020    Patient:  Name:  Bonifacio Lewis  MRN:  [id number]  1970  50 y.o.  male         CC: hypercalcemia    Interval History:  Follow up sarcoidosis, hypercalcemia  Still states he is very nauseated, but doesn't appear in as much pain as yesterday  States his breathing \"is fine, not a problem with that\"      ROS: No fever, no diarrhea, no chest pain  PMFSSH: no change    Physical Exam:  /92 (BP Location: Right arm, Patient Position: Lying)   Pulse 73   Temp 97.8 °F (36.6 °C) (Oral)   Resp 18   Ht 198.1 cm (77.99\")   Wt 81.9 kg (180 lb 9.6 oz)   SpO2 96%   BMI 20.87 kg/m²   Body mass index is 20.87 kg/m².    Intake/Output Summary (Last 24 hours) at 8/3/2020 1055  Last data filed at 8/3/2020 0543  Gross per 24 hour   Intake 720 ml   Output 2075 ml   Net -1355 ml     General appearance: ill appearing  holding his abd conversant   Eyes: anicteric sclerae, moist conjunctivae; no lid-lag; PERRLA  HENT: Atraumatic; oropharynx clear with moist mucous membranes and no mucosal ulcerations; normal hard and soft palate  Neck: Trachea midline; FROM, supple, will not allow palpatoin  Lungs: CTA, with normal respiratory effort and no intercostal retractions  CV: RRR, no MRGs   Abdomen: Soft, non-tender; no masses or HSM  Extremities: No peripheral edema or extremity lymphadenopathy  Skin: Normal temperature, turgor and texture; no rash, ulcers or subcutaneous nodules  Psych: agitated affect, alert and oriented to person place    Data Review:  Notable Labs:  Results from last 7 days   Lab Units 08/03/20  0730 08/02/20  0551 07/31/20  0716 07/30/20  0544 07/30/20  0019   WBC 10*3/mm3 13.53* 8.36 7.38 6.28 8.51   HEMOGLOBIN g/dL 12.7* 10.0* 12.4* 11.7* 14.3   PLATELETS 10*3/mm3 364 280 295 274 326     Results from last 7 days   Lab Units 08/03/20  0730 08/02/20  1408 08/02/20  0551 07/31/20  1037 07/30/20  0544 07/30/20  0019   SODIUM mmol/L 132* 141 " 144 133* 133* 133*   POTASSIUM mmol/L 2.7* 3.1* 6.1* 3.2* 3.0* 3.7   CHLORIDE mmol/L 100 108* 118* 96* 102 96*   CO2 mmol/L 21.3* 18.3* 17.5* 25.4 24.0 26.8   BUN mg/dL 12 9 8 12 16 19   CREATININE mg/dL 1.45* 1.35* 1.12 1.79* 1.52* 1.80*   GLUCOSE mg/dL 106* 100* 75 92 95 107*   CALCIUM mg/dL 13.5* 12.8* 10.4 14.2* 12.0* 14.4*   MAGNESIUM mg/dL 1.5* 1.3* 0.9*  --   --   --    PHOSPHORUS mg/dL 2.5 2.5 2.4*  --   --   --    Estimated Creatinine Clearance: 71.4 mL/min (A) (by C-G formula based on SCr of 1.45 mg/dL (H)).    Results from last 7 days   Lab Units 08/03/20  0730 08/02/20  0551 07/31/20  0716 07/30/20  0544 07/30/20  0019   AST (SGOT) U/L  --   --   --  12 14   ALT (SGPT) U/L  --   --   --  10 13   PLATELETS 10*3/mm3 364 280 295 274 326             Imaging:  Reviewed chest images personally from past 3 days    Scheduled meds:      docusate sodium 100 mg Oral BID   polyethylene glycol 17 g Oral Daily   predniSONE 60 mg Oral Daily With Breakfast   senna 2 tablet Oral BID   sodium chloride 10 mL Intravenous Q12H       ASSESSMENT  /  PLAN:  1. Sarcoidosis with significant lung involvement and with hypercalcemia  2. Acute kidney injury  3. Constipation with abdominal distention and discomfort  4. Nausea and vomiting       Will hold mtx until gi issues improve  Cont pred 60 daily  Repeat ct chest when paitent able to lay still  Gi working up n/v  Calcium back up today, mgmt per nephrology as patient will allow.    Follow up with Dr Paniagua two weeks.         David Luna MD  [facility]  08/03/20  2354

## 2020-08-03 NOTE — PLAN OF CARE
Patient    continued  to  complain  of continuous  nausea.  Had  Zofran  x  2.  Denies  pain.   NPO  for  EGD  this  morning.  Potassium  replaced.   IV  fluid  ongoing.  Due  am  labs  this  morning.  Nursi  Problem: Patient Care Overview  Goal: Plan of Care Review  Outcome: Ongoing (interventions implemented as appropriate)  Flowsheets (Taken 8/3/2020 0551)  Progress: improving  Plan of Care Reviewed With: patient     Problem: Constipation (Adult)  Goal: Effective Bowel Elimination  Outcome: Ongoing (interventions implemented as appropriate)  Flowsheets (Taken 8/3/2020 0551)  Effective Bowel Elimination: making progress toward outcome  Goal: Comfort  Outcome: Ongoing (interventions implemented as appropriate)  Flowsheets (Taken 8/3/2020 0551)  Comfort: making progress toward outcome     Problem: Nutrition, Imbalanced: Inadequate Oral Intake (Adult)  Goal: Improved Oral Intake  Outcome: Ongoing (interventions implemented as appropriate)  Flowsheets (Taken 8/3/2020 0551)  Improved Oral Intake: making progress toward outcome  Goal: Prevent Further Weight Loss  Outcome: Ongoing (interventions implemented as appropriate)  Flowsheets (Taken 8/3/2020 0551)  Prevent Further Weight Loss: making progress toward outcome     Problem: Nausea/Vomiting (Adult)  Goal: Symptom Relief  Outcome: Ongoing (interventions implemented as appropriate)  Flowsheets (Taken 8/3/2020 0551)  Symptom Relief: making progress toward outcome  Goal: Adequate Hydration  Outcome: Ongoing (interventions implemented as appropriate)     Problem: Skin Injury Risk (Adult)  Goal: Skin Health and Integrity  Outcome: Ongoing (interventions implemented as appropriate)  Flowsheets (Taken 8/3/2020 0551)  Skin Health and Integrity: making progress toward outcome   ng  will  continue to monitor.

## 2020-08-03 NOTE — PROGRESS NOTES
[facility]  Inpatient Progress Note    Reason for Follow Up:  Nausea and vomiting    Subjective      Interval History:   He complains of more nausea.  He has no abdominal pain or chest pain.  His bowels are moving.  In reviewing his lab work his calcium is elevated at 13.5 today.    Current Facility-Administered Medications:   •  acetaminophen (TYLENOL) tablet 650 mg, 650 mg, Oral, Q4H PRN **OR** acetaminophen (TYLENOL) 160 MG/5ML solution 650 mg, 650 mg, Oral, Q4H PRN **OR** acetaminophen (TYLENOL) suppository 650 mg, 650 mg, Rectal, Q4H PRN, Sophia Pennington APRN  •  bisacodyl (DULCOLAX) suppository 10 mg, 10 mg, Rectal, Daily PRN, Sophia Pennington APRN, 10 mg at 07/30/20 0423  •  docusate sodium (COLACE) capsule 100 mg, 100 mg, Oral, BID, Sophia Pennington APRN, 100 mg at 08/02/20 2129  •  famotidine (PEPCID) injection 20 mg, 20 mg, Intravenous, Q12H, Mart Morgan MD  •  nitroglycerin (NITROSTAT) SL tablet 0.4 mg, 0.4 mg, Sublingual, Q5 Min PRN, Sophia Pennington APRN  •  ondansetron (ZOFRAN) injection 4 mg, 4 mg, Intravenous, Q6H PRN, Levi Warren MD, 4 mg at 08/03/20 1153  •  polyethylene glycol 3350 powder (packet), 17 g, Oral, Daily, Spohia Pennington APRN, 17 g at 08/02/20 0904  •  potassium chloride 10 mEq in 100 mL IVPB, 10 mEq, Intravenous, Q1H PRN, Levi Warren MD, Last Rate: 100 mL/hr at 08/03/20 1320, 10 mEq at 08/03/20 1320  •  predniSONE (DELTASONE) tablet 60 mg, 60 mg, Oral, Daily With Breakfast, Aric Mojica MD, 60 mg at 08/03/20 0840  •  prochlorperazine (COMPAZINE) injection 5 mg, 5 mg, Intravenous, Q6H PRN, Sophia Pennington, APRN, 5 mg at 08/02/20 0300  •  promethazine (PHENERGAN) tablet 25 mg, 25 mg, Oral, Q6H PRN, Sophia Pennington, APRN, 25 mg at 08/02/20 1738  •  senna (SENOKOT) tablet 2 tablet, 2 tablet, Oral, BID, Steffen Meadows MD, 2 tablet at 08/02/20 2125  •  sodium chloride 0.45 % infusion, 75 mL/hr,  Intravenous, Continuous, Nader Christensen MD, Last Rate: 75 mL/hr at 08/03/20 0846, 75 mL/hr at 08/03/20 0846  •  [COMPLETED] Insert peripheral IV, , , Once **AND** sodium chloride 0.9 % flush 10 mL, 10 mL, Intravenous, PRN, Natalio Murillo MD  •  sodium chloride 0.9 % flush 10 mL, 10 mL, Intravenous, Q12H, Sophia Pennington APRN, 10 mL at 08/03/20 0841  •  sodium chloride 0.9 % flush 10 mL, 10 mL, Intravenous, PRN, Sophia Pennington APRN  •  sucralfate (CARAFATE) tablet 1 g, 1 g, Oral, 4x Daily AC & at Bedtime, Mart Morgan MD  Review of Systems:    The following systems were reviewed and negative;  constitution, respiratory, cardiovascular, genitourinary, hematologic / lymphatic, musculoskeletal and neurological    Objective     Vital Signs  Temp:  [97.1 °F (36.2 °C)-98.2 °F (36.8 °C)] 97.8 °F (36.6 °C)  Heart Rate:  [65-88] 73  Resp:  [18-20] 18  BP: (125-130)/(72-92) 125/92  Body mass index is 20.87 kg/m².    Intake/Output Summary (Last 24 hours) at 8/3/2020 1420  Last data filed at 8/3/2020 1320  Gross per 24 hour   Intake 820 ml   Output 2325 ml   Net -1505 ml     I/O this shift:  In: 100 [IV Piggyback:100]  Out: 250 [Urine:250]     Physical Exam:   General: patient awake, alert and cooperative   Eyes: Normal lids and lashes, no scleral icterus   Neck: supple, normal ROM   Skin: warm and dry, not jaundiced   Cardiovascular: regular rhythm and rate, no murmurs auscultated   Pulm: clear to auscultation bilaterally, regular and unlabored   Abdomen: soft, nontender, nondistended; normal bowel sounds   Extremities: no rash or edema   Psychiatric: Normal mood and behavior; memory intact     Results Review:     I reviewed the patient's new clinical results.    Results from last 7 days   Lab Units 08/03/20  0730 08/02/20  0551 07/31/20  0716   WBC 10*3/mm3 13.53* 8.36 7.38   HEMOGLOBIN g/dL 12.7* 10.0* 12.4*   HEMATOCRIT % 35.9* 29.2* 36.1*   PLATELETS 10*3/mm3 364 280 295     Results from last 7  days   Lab Units 08/03/20  0730 08/02/20  1408 08/02/20  0551  07/30/20  0544 07/30/20  0019   SODIUM mmol/L 132* 141 144   < > 133* 133*   POTASSIUM mmol/L 2.7* 3.1* 6.1*   < > 3.0* 3.7   CHLORIDE mmol/L 100 108* 118*   < > 102 96*   CO2 mmol/L 21.3* 18.3* 17.5*   < > 24.0 26.8   BUN mg/dL 12 9 8   < > 16 19   CREATININE mg/dL 1.45* 1.35* 1.12   < > 1.52* 1.80*   CALCIUM mg/dL 13.5* 12.8* 10.4   < > 12.0* 14.4*   BILIRUBIN mg/dL  --   --   --   --  0.5 0.7   ALK PHOS U/L  --   --   --   --  63 83   ALT (SGPT) U/L  --   --   --   --  10 13   AST (SGOT) U/L  --   --   --   --  12 14   GLUCOSE mg/dL 106* 100* 75   < > 95 107*    < > = values in this interval not displayed.         Lab Results   Lab Value Date/Time    LIPASE 13 07/29/2020 2340       Radiology:  XR Abdomen KUB   Final Result       As described.       This report was finalized on 8/1/2020 4:10 PM by Dr. Vinod Stauffer M.D.          XR Spine Lumbar 2 or 3 View   Final Result      CT Abdomen Pelvis With Contrast   Final Result       1. Increased stool burden seen within the rectosigmoid, in keeping with   history of constipation.   2. Worsening airspace consolidation noted at the lung bases bilaterally.   Correlation with any evidence of pneumonia is recommended.   3. Paraspinous fluid collection. This was also present on prior exam,   although it appears smaller on current study. See description above.       Radiation dose reduction techniques were utilized, including automated   exposure control and exposure modulation based on body size.       This report was finalized on 7/30/2020 1:46 AM by Dr. Padmaja Owusu M.D.          XR Abdomen KUB   Final Result   Increased stool burden within the rectal vault, in keeping with history   of constipation. Extensive bilateral pulmonary infiltrates are again   seen.       This report was finalized on 7/29/2020 9:39 PM by Dr. Padmaja Owusu M.D.              Assessment/Plan     Patient Active Problem  List   Diagnosis   • Dyspnea   • Lung nodules   • Sarcoidosis of lung (CMS/HCC)   • S/P lumbar laminectomy   • Hypercalcemia   • JACKI (acute kidney injury) (CMS/HCC)   • Constipation   • Non-intractable vomiting with nausea   • Nausea       Assessment/Recommendations:    1. Nausea and vomiting.  Patient has Zofran and Phenergan ordered as needed.  EGD to be done when OK with all MD's. His calcium level is even higher today at 13.5. I am reluctant to do an EGD now. I will put him on Carafate elixir and Pepcid 20 mg IV BID.  2. Pulmonary sarcoidosis.  Stable.  Status post methotrexate.  3. Constipation.  Stable.  This is likely due to hypercalcemia.  4. Chronic kidney disease stage III.  5. Hyperkalemia.  Plans for correction per renal.     I discussed the patients findings and my recommendations with patient and nursing staff.    Mart Morgan MD

## 2020-08-04 ENCOUNTER — APPOINTMENT (OUTPATIENT)
Dept: CT IMAGING | Facility: HOSPITAL | Age: 50
End: 2020-08-04

## 2020-08-04 LAB
ALBUMIN SERPL-MCNC: 3.5 G/DL (ref 3.5–5.2)
ANION GAP SERPL CALCULATED.3IONS-SCNC: 8.9 MMOL/L (ref 5–15)
BUN SERPL-MCNC: 13 MG/DL (ref 6–20)
BUN/CREAT SERPL: 9.6 (ref 7–25)
CALCIUM SPEC-SCNC: 12.4 MG/DL (ref 8.6–10.5)
CHLORIDE SERPL-SCNC: 109 MMOL/L (ref 98–107)
CO2 SERPL-SCNC: 20.1 MMOL/L (ref 22–29)
CREAT SERPL-MCNC: 1.36 MG/DL (ref 0.76–1.27)
GFR SERPL CREATININE-BSD FRML MDRD: 68 ML/MIN/1.73
GLUCOSE SERPL-MCNC: 87 MG/DL (ref 65–99)
MAGNESIUM SERPL-MCNC: 1.8 MG/DL (ref 1.6–2.6)
PHOSPHATE SERPL-MCNC: 2.8 MG/DL (ref 2.5–4.5)
POTASSIUM SERPL-SCNC: 3.7 MMOL/L (ref 3.5–5.2)
SODIUM SERPL-SCNC: 138 MMOL/L (ref 136–145)
URATE SERPL-MCNC: 10.3 MG/DL (ref 3.4–7)

## 2020-08-04 PROCEDURE — 99232 SBSQ HOSP IP/OBS MODERATE 35: CPT | Performed by: INTERNAL MEDICINE

## 2020-08-04 PROCEDURE — 80069 RENAL FUNCTION PANEL: CPT | Performed by: INTERNAL MEDICINE

## 2020-08-04 PROCEDURE — 25010000002 PROCHLORPERAZINE 10 MG/2ML SOLUTION: Performed by: NURSE PRACTITIONER

## 2020-08-04 PROCEDURE — 83735 ASSAY OF MAGNESIUM: CPT | Performed by: INTERNAL MEDICINE

## 2020-08-04 PROCEDURE — 71250 CT THORAX DX C-: CPT

## 2020-08-04 PROCEDURE — 63710000001 PREDNISONE PER 5 MG: Performed by: INTERNAL MEDICINE

## 2020-08-04 PROCEDURE — 84550 ASSAY OF BLOOD/URIC ACID: CPT | Performed by: INTERNAL MEDICINE

## 2020-08-04 RX ORDER — SENNA PLUS 8.6 MG/1
2 TABLET ORAL NIGHTLY PRN
Status: DISCONTINUED | OUTPATIENT
Start: 2020-08-04 | End: 2020-08-06 | Stop reason: HOSPADM

## 2020-08-04 RX ORDER — DOCUSATE SODIUM 100 MG/1
100 CAPSULE, LIQUID FILLED ORAL 2 TIMES DAILY PRN
Status: DISCONTINUED | OUTPATIENT
Start: 2020-08-04 | End: 2020-08-06 | Stop reason: HOSPADM

## 2020-08-04 RX ADMIN — SODIUM CHLORIDE, PRESERVATIVE FREE 10 ML: 5 INJECTION INTRAVENOUS at 09:03

## 2020-08-04 RX ADMIN — SODIUM CHLORIDE, PRESERVATIVE FREE 10 ML: 5 INJECTION INTRAVENOUS at 09:04

## 2020-08-04 RX ADMIN — FAMOTIDINE 20 MG: 10 INJECTION INTRAVENOUS at 09:03

## 2020-08-04 RX ADMIN — SODIUM CHLORIDE 125 ML/HR: 9 INJECTION, SOLUTION INTRAVENOUS at 00:36

## 2020-08-04 RX ADMIN — SUCRALFATE 1 G: 1 TABLET ORAL at 12:16

## 2020-08-04 RX ADMIN — PROCHLORPERAZINE EDISYLATE 5 MG: 5 INJECTION INTRAMUSCULAR; INTRAVENOUS at 00:01

## 2020-08-04 RX ADMIN — SUCRALFATE 1 G: 1 TABLET ORAL at 17:30

## 2020-08-04 RX ADMIN — FAMOTIDINE 20 MG: 10 INJECTION INTRAVENOUS at 21:39

## 2020-08-04 RX ADMIN — SUCRALFATE 1 G: 1 TABLET ORAL at 07:30

## 2020-08-04 RX ADMIN — PREDNISONE 60 MG: 50 TABLET ORAL at 09:03

## 2020-08-04 RX ADMIN — SODIUM CHLORIDE, PRESERVATIVE FREE 10 ML: 5 INJECTION INTRAVENOUS at 20:03

## 2020-08-04 RX ADMIN — SUCRALFATE 1 G: 1 TABLET ORAL at 21:39

## 2020-08-04 RX ADMIN — SODIUM CHLORIDE 125 ML/HR: 9 INJECTION, SOLUTION INTRAVENOUS at 20:03

## 2020-08-04 RX ADMIN — SODIUM CHLORIDE 125 ML/HR: 9 INJECTION, SOLUTION INTRAVENOUS at 09:02

## 2020-08-04 NOTE — PAYOR COMM NOTE
"Ramin Graham (50 y.o. Male)     ATTN: CONTINUED STAY CLINICALS FOR REVIEW, #[id number]   DEPT: GERBER HUITRON RN, [phone]        Date of Birth Social Security Number Address Home Phone MRN    1970  [address]  [facility] [id number]  [id number]    Episcopalian Marital Status          None Single       Admission Date Admission Type Admitting Provider Attending Provider Department, Room/Bed    7/29/20 Emergency Steffen Meadows MD Lykins, Matthew D, MD [address], E663/1    Discharge Date Discharge Disposition Discharge Destination                       Attending Provider:  Steffen Meadows MD    Allergies:  No Known Allergies    Isolation:  None   Infection:  None   Code Status:  CPR    Ht:  198.1 cm (77.99\")   Wt:  80.5 kg (177 lb 8 oz)    Admission Cmt:  None   Principal Problem:  Hypercalcemia [E83.52]                 Active Insurance as of 7/29/2020     Primary Coverage     Payor Plan Insurance Group Employer/Plan Group    [place] [id number]     Payor Plan Address Payor Plan Phone Number Payor Plan Fax Number Effective Dates    [employer] Claims Office - [address] [address] [phone]  6/1/2020 - None Entered    [facility] [id number]       Subscriber Name Subscriber Birth Date Member ID       RAMIN GRAHAM 1970 [id number]                 Emergency Contacts      (Rel.) Home Phone Work Phone Mobile Phone    Codie Sainz (Mother) [phone] -- --              Vital Signs (last 2 days)     Date/Time   Temp   Temp src   Pulse   Resp   BP   Patient Position   SpO2    08/04/20 0744   97.2 (36.2)   Oral   65   16   131/80   Lying   100    08/04/20 0522   98 (36.7)   Oral   --   16   132/77   Lying   --    08/04/20 0031   97.1 (36.2)   Oral   --   16   149/88   Lying   --    08/03/20 1929   99 (37.2)   Oral   --   16   131/83   Lying   94    08/03/20 1500   98 (36.7)   Oral   66   18   143/86   Lying   95    " 08/03/20 0737   97.8 (36.6)   Oral   73   18   125/92   Lying   96    08/02/20 2328   97.1 (36.2)   Oral   79   20   128/75   Lying   96    08/02/20 2003   98.2 (36.8)   Oral   88   20   130/75   Lying   97    08/02/20 1424   97.8 (36.6)   Oral   65   20   129/72   Lying   100    08/02/20 0757   98.1 (36.7)   Oral   71   18   141/90   Lying   98            Oxygen Therapy (last 2 days)     Date/Time   SpO2   Device (Oxygen Therapy)   Flow (L/min)   Oxygen Concentration (%)   ETCO2 (mmHg)    08/04/20 0744   100   room air   --   --   --    08/04/20 0522   --   room air   --   --   --    08/04/20 0031   --   room air   --   --   --    08/04/20 0000   --   room air   --   --   --    08/03/20 1929   94   room air   --   --   --    08/03/20 1500   95   room air   --   --   --    08/03/20 1421   --   room air   --   --   --    08/03/20 0840   --   room air   --   --   --    08/03/20 0737   96   room air   --   --   --    08/02/20 2328   96   room air   --   --   --    08/02/20 2120   --   room air   --   --   --    08/02/20 2003   97   room air   --   --   --    08/02/20 1424   100   room air   --   --   --    08/02/20 1200   --   room air   --   --   --    08/02/20 0800   --   room air   --   --   --    08/02/20 0757   98   room air   --   --   --            Intake & Output (last 2 days)       08/02 0701 - 08/03 0700 08/03 0701 - 08/04 0700 08/04 0701 - 08/05 0700    P.O. 720      IV Piggyback  100     Total Intake(mL/kg) 720 (8.8) 100 (1.2)     Urine (mL/kg/hr) 3150 (1.6) 1950 (1)     Stool 0      Total Output 3150 1950     Net -2430 -1850            Stool Unmeasured Occurrence 1 x          Current Facility-Administered Medications   Medication Dose Route Frequency Provider Last Rate Last Dose   • acetaminophen (TYLENOL) tablet 650 mg  650 mg Oral Q4H PRN Sophia Pennington APRN        Or   • acetaminophen (TYLENOL) 160 MG/5ML solution 650 mg  650 mg Oral Q4H PRN Sophia Pennington APRN        Or   • acetaminophen  (TYLENOL) suppository 650 mg  650 mg Rectal Q4H PRN Sophia Pennington APRN       • bisacodyl (DULCOLAX) suppository 10 mg  10 mg Rectal Daily PRN Sophia Pennington APRN   10 mg at 07/30/20 0423   • docusate sodium (COLACE) capsule 100 mg  100 mg Oral BID Sophia Pennington APRN   100 mg at 08/02/20 2129   • famotidine (PEPCID) injection 20 mg  20 mg Intravenous Q12H Mart Morgan MD   20 mg at 08/03/20 2025   • nitroglycerin (NITROSTAT) SL tablet 0.4 mg  0.4 mg Sublingual Q5 Min PRN Sophia Pennington APRN       • ondansetron (ZOFRAN) injection 4 mg  4 mg Intravenous Q6H PRN Levi Warren MD   4 mg at 08/03/20 1153   • polyethylene glycol 3350 powder (packet)  17 g Oral Daily Sophia Pennington APRN   17 g at 08/02/20 0904   • potassium chloride 10 mEq in 100 mL IVPB  10 mEq Intravenous Q1H PRN Levi Warren  mL/hr at 08/03/20 1553 10 mEq at 08/03/20 1553   • predniSONE (DELTASONE) tablet 60 mg  60 mg Oral Daily With Breakfast Aric Mojica MD   60 mg at 08/03/20 0840   • prochlorperazine (COMPAZINE) injection 5 mg  5 mg Intravenous Q6H PRN Sophia Pennington APRN   5 mg at 08/04/20 0001   • promethazine (PHENERGAN) tablet 25 mg  25 mg Oral Q6H PRN Sophia Pennington APRN   25 mg at 08/02/20 1738   • senna (SENOKOT) tablet 2 tablet  2 tablet Oral BID Steffen Meadows MD   2 tablet at 08/02/20 2129   • sodium chloride 0.9 % flush 10 mL  10 mL Intravenous PRN Natalio Murillo MD       • sodium chloride 0.9 % flush 10 mL  10 mL Intravenous Q12H Sophia Pennington APRN   10 mL at 08/03/20 2026   • sodium chloride 0.9 % flush 10 mL  10 mL Intravenous PRN Sophia Pennington APRN       • sodium chloride 0.9 % infusion  125 mL/hr Intravenous Continuous [place]  mL/hr at 08/04/20 0036 125 mL/hr at 08/04/20 0036   • sucralfate (CARAFATE) tablet 1 g  1 g Oral 4x Daily AC & at Bedtime Mart Morgan MD   1 g at 08/04/20 0730     Orders (last 48  hrs)      Start     Ordered    08/04/20 0430  Uric Acid  Morning Draw      08/03/20 1535    08/04/20 0430  Renal Function Panel  Morning Draw      08/03/20 1535    08/04/20 0430  Magnesium  Morning Draw      08/03/20 1535    08/03/20 2100  famotidine (PEPCID) injection 20 mg  Every 12 Hours Scheduled      08/03/20 1420    08/03/20 1730  sucralfate (CARAFATE) tablet 1 g  4 Times Daily Before Meals & Nightly     Note to Pharmacy:  Please give Carafate Elixir 1 gm po AC and HS. thx.    08/03/20 1420    08/03/20 1700  pamidronate (AREDIA) 30 mg in sodium chloride 0.9 % 260 mL infusion  Once      08/03/20 1535    08/03/20 1630  sodium chloride 0.9 % infusion  Continuous      08/03/20 1535    08/03/20 1630  potassium chloride (MICRO-K) CR capsule 40 mEq  Every 2 Hours      08/03/20 1535    08/03/20 1630  magnesium sulfate in D5W 1g/100mL (PREMIX)  Every 1 Hour      08/03/20 1535    08/03/20 1447  Diet Clear Liquid  Diet Effective Now      08/03/20 1446    08/03/20 0857  Blood Gas, Arterial  STAT,   Status:  Canceled      08/03/20 0856    08/03/20 0747  Basic Metabolic Panel  Once,   Status:  Canceled      08/03/20 0747    08/03/20 0747  Magnesium  Once,   Status:  Canceled      08/03/20 0747    08/03/20 0746  CBC & Differential  Once      08/03/20 0747    08/03/20 0746  CBC Auto Differential  PROCEDURE ONCE      08/03/20 0747    08/03/20 0600  Magnesium  Morning Draw      08/02/20 0927    08/03/20 0600  Renal Function Panel  Morning Draw      08/02/20 0927    08/02/20 1715  Connectors / Hubs Must Be Scrubbed 15 Seconds Using 70% Alcohol Before Access - Allow to Dry Before Accessing Line  Continuous      08/02/20 1714    08/02/20 1715  Change Needleless Connectors  Per Order Details     Comments:  Change Needleless Connectors When:  - Removed For Any Reason  - Residual Blood or Debris Within Connector  - Prior to Drawing Blood Cultures  - Contamination of Connector  - After Administration of Blood or Blood Components     08/02/20 1714    08/02/20 1545  magnesium sulfate in D5W 1g/100mL (PREMIX)  Every 1 Hour      08/02/20 1459    08/02/20 1545  potassium chloride 10 mEq in 100 mL IVPB  Every 1 Hour      08/02/20 1459    08/02/20 1545  sodium chloride 0.45 % infusion  Continuous,   Status:  Discontinued      08/02/20 1459    08/02/20 1502  IV TEAM - Consult for Midline Placement (IV Team to Determine Number of Lumens)  Once     Provider:  (Not yet assigned)    08/02/20 1501    08/02/20 1453  Phosphorus  Once      08/02/20 1454    08/02/20 1400  Basic Metabolic Panel  Timed,   Status:  Canceled      08/02/20 0851    08/02/20 1336  Magnesium  STAT      08/02/20 1335    08/02/20 1334  Basic Metabolic Panel  STAT      08/02/20 1335    08/02/20 0945  dextrose 5 % 925 mL with sodium bicarbonate 8.4 % 75 mEq infusion  Continuous,   Status:  Discontinued      08/02/20 0851    08/02/20 0945  magnesium sulfate in D5W 1g/100mL (PREMIX)  Every 1 Hour,   Status:  Discontinued      08/02/20 0851    08/02/20 0945  sodium bicarbonate injection 8.4% 50 mEq  Once,   Status:  Discontinued      08/02/20 0851    08/01/20 2237  promethazine (PHENERGAN) tablet 25 mg  Every 6 Hours PRN      08/01/20 2237    08/01/20 2236  prochlorperazine (COMPAZINE) injection 5 mg  Every 6 Hours PRN      08/01/20 2237    08/01/20 1030  sodium chloride 0.9 % with KCl 20 mEq/L infusion  Continuous,   Status:  Discontinued      08/01/20 0938    07/31/20 1800  Dietary Nutrition Supplements Boost Plus (Ensure Enlive, Ensure Plus); strawberry  Daily With Breakfast & Dinner      07/31/20 1246    07/31/20 1645  methotrexate tablet 5 mg  Weekly,   Status:  Discontinued      07/31/20 1555    07/30/20 1558  potassium chloride 10 mEq in 100 mL IVPB  Every 1 Hour PRN      07/30/20 1559    07/30/20 0900  docusate sodium (COLACE) capsule 100 mg  2 Times Daily      07/30/20 0241    07/30/20 0900  polyethylene glycol 3350 powder (packet)  Daily      07/30/20 0241    07/30/20 0900  senna  (SENOKOT) tablet 2 tablet  2 Times Daily      07/30/20 0418    07/30/20 0857  ondansetron (ZOFRAN) injection 4 mg  Every 6 Hours PRN      07/30/20 0858    07/30/20 0800  predniSONE (DELTASONE) tablet 60 mg  Daily With Breakfast      07/30/20 0714    07/30/20 0243  sodium chloride 0.9 % flush 10 mL  Every 12 Hours Scheduled      07/30/20 0241    07/30/20 0240  bisacodyl (DULCOLAX) suppository 10 mg  Daily PRN      07/30/20 0241    07/30/20 0239  acetaminophen (TYLENOL) tablet 650 mg  Every 4 Hours PRN      07/30/20 0241    07/30/20 0239  acetaminophen (TYLENOL) 160 MG/5ML solution 650 mg  Every 4 Hours PRN      07/30/20 0241    07/30/20 0239  acetaminophen (TYLENOL) suppository 650 mg  Every 4 Hours PRN      07/30/20 0241    07/30/20 0238  nitroglycerin (NITROSTAT) SL tablet 0.4 mg  Every 5 Minutes PRN      07/30/20 0241    07/30/20 0238  sodium chloride 0.9 % flush 10 mL  As Needed      07/30/20 0241    07/29/20 2246  sodium chloride 0.9 % flush 10 mL  As Needed      07/29/20 2247    Unscheduled  Telemetry - Pulse Oximetry  Continuous PRN     Comments:  If Patient Develops Unresponsiveness, Acute Dyspnea, Cyanosis or Suspected Hypoxemia Start Continuous Pulse Ox Monitoring, Apply Oxygen & Notify Provider    07/30/20 0241    Unscheduled  Oxygen Therapy- Nasal Cannula; Titrate for SPO2: 90% - 95%  Continuous PRN     Comments:  If Patient Develops Unresponsiveness, Acute Dyspnea, Cyanosis or Suspected Hypoxemia Start Continuous Pulse Ox Monitoring, Apply Oxygen & Notify Provider    07/30/20 0241    Unscheduled  ECG 12 Lead  As Needed     Comments:  Nurse to Release if Patient Expericences Acute Chest Pain or Dysrhythmias    07/30/20 0241    Unscheduled  Potassium  As Needed     Comments:  For Ventricular Arrhythmias      07/30/20 0241    Unscheduled  Magnesium  As Needed     Comments:  For Ventricular Arrhythmias      07/30/20 0241    Unscheduled  Troponin  As Needed     Comments:  For Chest Pain      07/30/20 0241     Unscheduled  Digoxin Level  As Needed     Comments:  For Atrial Arrhythmias      20 0241    Unscheduled  Blood Gas, Arterial  As Needed     Comments:  Per O2 PolicyNotify Physician      20 0241    Unscheduled  Up With Assistance  As Needed      20 024    Unscheduled  Change Dressing to IV Site As Needed When Damp, Loose or Soiled  As Needed      20 1714                 Physician Progress Notes (last 48 hours) (Notes from 20 0847 through 20 0847)      Steffen Meadows MD at 20 1736              Name: Bonifacio Lewis ADMIT: 2020   : 1970  PCP: Vinod Reyna MD    MRN: [id number] LOS: 4 days   AGE/SEX: 50 y.o. male  ROOM: [facility]     Subjective   Subjective   CC: nausea  No acute events. Patient is still having nausea and general malaise but no vomiting today. No f/c/d. Had large BM x2 today.     Objective   Objective   Vital Signs  Temp:  [97.1 °F (36.2 °C)-98.2 °F (36.8 °C)] 98 °F (36.7 °C)  Heart Rate:  [66-88] 66  Resp:  [18-20] 18  BP: (125-143)/(75-92) 143/86  SpO2:  [95 %-97 %] 95 %  on   ;   Device (Oxygen Therapy): room air  Body mass index is 20.87 kg/m².  Physical Exam   Constitutional: He is oriented to person, place, and time. No distress.   HENT:   Head: Normocephalic and atraumatic.   Mouth/Throat: Oropharynx is clear and moist.   Eyes: Pupils are equal, round, and reactive to light. Conjunctivae and EOM are normal.   Neck: Normal range of motion. Neck supple. No JVD present.   Cardiovascular: Normal rate, regular rhythm and intact distal pulses.   Pulmonary/Chest: Effort normal and breath sounds normal.   Abdominal: Soft. Bowel sounds are normal.   Musculoskeletal: He exhibits no edema or tenderness.   Neurological: He is alert and oriented to person, place, and time.   Skin: Skin is warm and dry. Capillary refill takes less than 2 seconds. He is not diaphoretic.   Psychiatric: He has a normal mood and affect. His behavior is normal.   Nursing  note and vitals reviewed.    Results Review:       I reviewed the patient's new clinical results.  I reviewed the patient's telemetry.  Results from last 7 days   Lab Units 08/03/20  0730 08/02/20  0551 07/31/20  0716 07/30/20  0544   WBC 10*3/mm3 13.53* 8.36 7.38 6.28   HEMOGLOBIN g/dL 12.7* 10.0* 12.4* 11.7*   PLATELETS 10*3/mm3 364 280 295 274     Results from last 7 days   Lab Units 08/03/20  0730 08/02/20  1408 08/02/20  0551 07/31/20  1037   SODIUM mmol/L 132* 141 144 133*   POTASSIUM mmol/L 2.7* 3.1* 6.1* 3.2*   CHLORIDE mmol/L 100 108* 118* 96*   CO2 mmol/L 21.3* 18.3* 17.5* 25.4   BUN mg/dL 12 9 8 12   CREATININE mg/dL 1.45* 1.35* 1.12 1.79*   GLUCOSE mg/dL 106* 100* 75 92   Estimated Creatinine Clearance: 71.4 mL/min (A) (by C-G formula based on SCr of 1.45 mg/dL (H)).  Results from last 7 days   Lab Units 08/03/20  0730 08/02/20  0551 07/30/20  0544 07/30/20  0019   ALBUMIN g/dL 3.90 3.20* 3.00* 3.80   BILIRUBIN mg/dL  --   --  0.5 0.7   ALK PHOS U/L  --   --  63 83   AST (SGOT) U/L  --   --  12 14   ALT (SGPT) U/L  --   --  10 13     Results from last 7 days   Lab Units 08/03/20  0730 08/02/20  1408 08/02/20  0551 07/31/20  1037 07/30/20  0544 07/30/20  0019   CALCIUM mg/dL 13.5* 12.8* 10.4 14.2* 12.0* 14.4*   ALBUMIN g/dL 3.90  --  3.20*  --  3.00* 3.80   MAGNESIUM mg/dL 1.5* 1.3* 0.9*  --   --   --    PHOSPHORUS mg/dL 2.5 2.5 2.4*  --   --   --        Results from last 7 days   Lab Units 08/01/20  2208   COVID19  Not Detected       No results found for: HGBA1C, POCGLU    XR Abdomen KUB  Narrative: XR ABDOMEN KUB-     INDICATIONS: Nausea, vomiting, constipation     TECHNIQUE: Supine views of the abdomen     COMPARISON:  image from CT from 07/30/2020     FINDINGS:      The bowel gas pattern is nonobstructive. No supine evidence for free  intraperitoneal gas. Amount of stool in the rectosigmoid colon appears  decreased. No definite nephrolithiasis. Follow-up as indications  persist.     Persistent  infiltrates in the bilateral lower lungs.           Impression:    As described.     This report was finalized on 8/1/2020 4:10 PM by Dr. Vinod Stauffer M.D.           docusate sodium 100 mg Oral BID   famotidine 20 mg Intravenous Q12H   magnesium sulfate 1 g Intravenous Q1H   pamidronate (AREDIA) IVPB 30 mg Intravenous Once   polyethylene glycol 17 g Oral Daily   potassium chloride 40 mEq Oral Q2H   predniSONE 60 mg Oral Daily With Breakfast   senna 2 tablet Oral BID   sodium chloride 10 mL Intravenous Q12H   sucralfate 1 g Oral 4x Daily AC & at Bedtime       sodium chloride 125 mL/hr Last Rate: 125 mL/hr (08/03/20 1616)   Diet Clear Liquid      Assessment/Plan     Active Hospital Problems    Diagnosis  POA   • **Hypercalcemia [E83.52]  Yes   • JACKI (acute kidney injury) (CMS/HCC) [N17.9]  Yes   • Constipation [K59.00]  Yes   • Non-intractable vomiting with nausea [R11.2]  Yes   • Nausea [R11.0]  Unknown   • 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   Hypercalcemia  - due to sarcoidosis and the source of most of his symptoms  - slow to respond but treatment has been delayed due to poor patient compliance  - continue on IVF  - calcitonin changed to pamindronate  - appreciate nephrology recs     Constipation/Nausea  - constipation resolved resolved, continue on bowel regimen  - appreciate GI recs, planning EGD when electrolytes are improved but in the mean time continue pepcid and carafate     JACKI  - probably due to volume depletion/hypercalcemia  - improved with IVF initially but serum creatinine is back up-IVF increased     Sarcoidosis  - pulmonary status appears stable  - continue on current dose of prednisone  - holding methotrexate until GI issues improve  - appreciate pulmonology recs, f/u with Dr. Paniagua in 2 weeks     S/P lumbar Laminectomy  - Dr. Bustillo has evaluated, appreciate  "recs-continue brace and f/u with him in 2 months    SCDs for DVT prophylaxis.  Full code.  Discussed with patient and nursing staff.  Anticipate discharge TBD.  timing yet to be determined.      Steffen Meadows MD  [facility]  08/03/20  17:34    I wore protective equipment throughout this patient encounter including a face mask, gloves and protective eyewear.  Hand hygiene was performed before donning protective equipment and after removal when leaving the room.        Electronically signed by Steffen Meadows MD at 08/03/20 8517     Robert Salvador MD at 08/03/20 1530             LOS: 4 days    Patient Care Team:  Vinod Reyna MD as PCP - General (Internal Medicine)    Chief Complaint:    Chief Complaint   Patient presents with   • Constipation   • Nausea     Follow-up acute kidney injury on chronic kidney disease and severe hypercalcemia  Subjective     Interval History:   The patient is feeling the same, denies any chest pain or shortness of air, no orthopnea or PND, has significant thirst.  And he is weak no gross hematuria    Objective     Vital Signs  Temp:  [97.1 °F (36.2 °C)-98.2 °F (36.8 °C)] 98 °F (36.7 °C)  Heart Rate:  [66-88] 66  Resp:  [18-20] 18  BP: (125-143)/(75-92) 143/86    Flowsheet Rows      First Filed Value   Admission Height  198.1 cm (78\") Documented at 07/29/2020 2005   Admission Weight  82.7 kg (182 lb 6.4 oz) Documented at 07/29/2020 2237          I/O this shift:  In: 100 [IV Piggyback:100]  Out: 500 [Urine:500]  I/O last 3 completed shifts:  In: 720 [P.O.:720]  Out: 4950 [Urine:4950]    Intake/Output Summary (Last 24 hours) at 8/3/2020 1530  Last data filed at 8/3/2020 1500  Gross per 24 hour   Intake 820 ml   Output 2575 ml   Net -1755 ml       Physical Exam:  General Appearance: alert, oriented x 3, no acute distress,   Skin: warm and dry  HEENT: pupils round and reactive to light, oral mucosa  Neck: supple, no JVD, trachea midline  Lungs: CTA, " unlabored breathing effort  Heart: RRR, normal S1 and S2, no S3, no rub  Abdomen: soft, nontender,  present bowel sounds to auscultation  : no palpable bladder,  Extremities: no edema, cyanosis or clubbing  Neuro: normal speech and mental status         Results Review:    Results from last 7 days   Lab Units 08/03/20  0730 08/02/20  1408 08/02/20  0551  07/30/20  0544 07/30/20  0019   SODIUM mmol/L 132* 141 144   < > 133* 133*   POTASSIUM mmol/L 2.7* 3.1* 6.1*   < > 3.0* 3.7   CHLORIDE mmol/L 100 108* 118*   < > 102 96*   CO2 mmol/L 21.3* 18.3* 17.5*   < > 24.0 26.8   BUN mg/dL 12 9 8   < > 16 19   CREATININE mg/dL 1.45* 1.35* 1.12   < > 1.52* 1.80*   CALCIUM mg/dL 13.5* 12.8* 10.4   < > 12.0* 14.4*   BILIRUBIN mg/dL  --   --   --   --  0.5 0.7   ALK PHOS U/L  --   --   --   --  63 83   ALT (SGPT) U/L  --   --   --   --  10 13   AST (SGOT) U/L  --   --   --   --  12 14   GLUCOSE mg/dL 106* 100* 75   < > 95 107*    < > = values in this interval not displayed.       Estimated Creatinine Clearance: 71.4 mL/min (A) (by C-G formula based on SCr of 1.45 mg/dL (H)).    Results from last 7 days   Lab Units 08/03/20  0730 08/02/20  1408 08/02/20  0551   MAGNESIUM mg/dL 1.5* 1.3* 0.9*   PHOSPHORUS mg/dL 2.5 2.5 2.4*             Results from last 7 days   Lab Units 08/03/20  0730 08/02/20  0551 07/31/20  0716 07/30/20  0544 07/30/20  0019   WBC 10*3/mm3 13.53* 8.36 7.38 6.28 8.51   HEMOGLOBIN g/dL 12.7* 10.0* 12.4* 11.7* 14.3   PLATELETS 10*3/mm3 364 280 295 274 326               Imaging Results (Last 24 Hours)     ** No results found for the last 24 hours. **          docusate sodium 100 mg Oral BID   famotidine 20 mg Intravenous Q12H   polyethylene glycol 17 g Oral Daily   predniSONE 60 mg Oral Daily With Breakfast   senna 2 tablet Oral BID   sodium chloride 10 mL Intravenous Q12H   sucralfate 1 g Oral 4x Daily AC & at Bedtime       sodium chloride 75 mL/hr Last Rate: 75 mL/hr (08/03/20 0846)       Medication Review:    Current Facility-Administered Medications   Medication Dose Route Frequency Provider Last Rate Last Dose   • acetaminophen (TYLENOL) tablet 650 mg  650 mg Oral Q4H PRN Sophia Pennington APRN        Or   • acetaminophen (TYLENOL) 160 MG/5ML solution 650 mg  650 mg Oral Q4H PRN Sophia Pennington APRN        Or   • acetaminophen (TYLENOL) suppository 650 mg  650 mg Rectal Q4H PRN Sophia Pennington APRN       • bisacodyl (DULCOLAX) suppository 10 mg  10 mg Rectal Daily PRN Sophia Pennington APRN   10 mg at 07/30/20 0423   • docusate sodium (COLACE) capsule 100 mg  100 mg Oral BID Sophia Pennington APRN   100 mg at 08/02/20 2129   • famotidine (PEPCID) injection 20 mg  20 mg Intravenous Q12H Mart Morgan MD       • nitroglycerin (NITROSTAT) SL tablet 0.4 mg  0.4 mg Sublingual Q5 Min PRN Sophia Pennington APRN       • ondansetron (ZOFRAN) injection 4 mg  4 mg Intravenous Q6H PRN Levi Warren MD   4 mg at 08/03/20 1153   • polyethylene glycol 3350 powder (packet)  17 g Oral Daily Sophia Pennington APRN   17 g at 08/02/20 0904   • potassium chloride 10 mEq in 100 mL IVPB  10 mEq Intravenous Q1H PRN Levi Warren  mL/hr at 08/03/20 1421 10 mEq at 08/03/20 1421   • predniSONE (DELTASONE) tablet 60 mg  60 mg Oral Daily With Breakfast Aric Mojica MD   60 mg at 08/03/20 0840   • prochlorperazine (COMPAZINE) injection 5 mg  5 mg Intravenous Q6H PRN Sophia Pennington APRN   5 mg at 08/03/20 1428   • promethazine (PHENERGAN) tablet 25 mg  25 mg Oral Q6H PRN Sophia Pennington APRN   25 mg at 08/02/20 1738   • senna (SENOKOT) tablet 2 tablet  2 tablet Oral BID Steffen Meadows MD   2 tablet at 08/02/20 2129   • sodium chloride 0.45 % infusion  75 mL/hr Intravenous Continuous Nader Christensen MD 75 mL/hr at 08/03/20 0846 75 mL/hr at 08/03/20 0846   • sodium chloride 0.9 % flush 10 mL  10 mL Intravenous PRN Natalio Murillo MD       • sodium chloride  0.9 % flush 10 mL  10 mL Intravenous Q12H Sophia Pennington APRN   10 mL at 08/03/20 0841   • sodium chloride 0.9 % flush 10 mL  10 mL Intravenous PRN Sophia Pennington APRN       • sucralfate (CARAFATE) tablet 1 g  1 g Oral 4x Daily AC & at Bedtime Mart Morgan MD           Assessment/Plan    1.  Acute kidney injury on chronic kidney disease stage IV associated with severe hypercalcemia, creatinine 1.45 slightly increased and the calcium is increased up to 13.5 he is currently given IV fluid has not been very effective  2.  Hypercalcemia associated with sarcoidosis calcium initially was 14.4 down to 10.4 and today up to 13.5  3.  Hypokalemia potassium today 2.7, associated with hypokalemia  4.  Hyponatremia associated with hypotonic solutions.  5.  Sarcoidosis  6.  Chronic back pain      Plan:  1.  Change IV fluid to normal saline to run at 125 cc/h  2.  Pamidronate 30 mg today IV x1  3.  Replete potassium and magnesium  4.  Surveillance labs        Robert Salvador MD  08/03/20  15:30      Electronically signed by Robert Salvador MD at 08/03/20 1535     Mart Morgan MD at 08/03/20 1420          [facility]  Inpatient Progress Note    Reason for Follow Up:  Nausea and vomiting    Subjective      Interval History:   He complains of more nausea.  He has no abdominal pain or chest pain.  His bowels are moving.  In reviewing his lab work his calcium is elevated at 13.5 today.    Current Facility-Administered Medications:   •  acetaminophen (TYLENOL) tablet 650 mg, 650 mg, Oral, Q4H PRN **OR** acetaminophen (TYLENOL) 160 MG/5ML solution 650 mg, 650 mg, Oral, Q4H PRN **OR** acetaminophen (TYLENOL) suppository 650 mg, 650 mg, Rectal, Q4H PRN, Sophia Pennington APRN  •  bisacodyl (DULCOLAX) suppository 10 mg, 10 mg, Rectal, Daily PRN, Sophia Pennington APRN, 10 mg at 07/30/20 5920  •  docusate sodium (COLACE) capsule 100 mg, 100 mg, Oral, BID, Sophia Pennington, APRN, 100  mg at 08/02/20 2129  •  famotidine (PEPCID) injection 20 mg, 20 mg, Intravenous, Q12H, Mart Morgan MD  •  nitroglycerin (NITROSTAT) SL tablet 0.4 mg, 0.4 mg, Sublingual, Q5 Min PRN, Sophia Pennington APRN  •  ondansetron (ZOFRAN) injection 4 mg, 4 mg, Intravenous, Q6H PRN, Levi Warren MD, 4 mg at 08/03/20 1153  •  polyethylene glycol 3350 powder (packet), 17 g, Oral, Daily, Sophia Pennington APRN, 17 g at 08/02/20 0904  •  potassium chloride 10 mEq in 100 mL IVPB, 10 mEq, Intravenous, Q1H PRN, Levi Warren MD, Last Rate: 100 mL/hr at 08/03/20 1320, 10 mEq at 08/03/20 1320  •  predniSONE (DELTASONE) tablet 60 mg, 60 mg, Oral, Daily With Breakfast, Aric Mojica MD, 60 mg at 08/03/20 0840  •  prochlorperazine (COMPAZINE) injection 5 mg, 5 mg, Intravenous, Q6H PRN, Sophia Pennington APRN, 5 mg at 08/02/20 0300  •  promethazine (PHENERGAN) tablet 25 mg, 25 mg, Oral, Q6H PRN, Sophia Pennington APRN, 25 mg at 08/02/20 1738  •  senna (SENOKOT) tablet 2 tablet, 2 tablet, Oral, BID, Steffen Meadows MD, 2 tablet at 08/02/20 2129  •  sodium chloride 0.45 % infusion, 75 mL/hr, Intravenous, Continuous, Nader Christensen MD, Last Rate: 75 mL/hr at 08/03/20 0846, 75 mL/hr at 08/03/20 0846  •  [COMPLETED] Insert peripheral IV, , , Once **AND** sodium chloride 0.9 % flush 10 mL, 10 mL, Intravenous, PRN, Natalio Murillo MD  •  sodium chloride 0.9 % flush 10 mL, 10 mL, Intravenous, Q12H, Sophia Pennington APRN, 10 mL at 08/03/20 0841  •  sodium chloride 0.9 % flush 10 mL, 10 mL, Intravenous, PRN, Sophia Pennington APRN  •  sucralfate (CARAFATE) tablet 1 g, 1 g, Oral, 4x Daily AC & at Bedtime, Mart Morgan MD  Review of Systems:    The following systems were reviewed and negative;  constitution, respiratory, cardiovascular, genitourinary, hematologic / lymphatic, musculoskeletal and neurological    Objective     Vital Signs  Temp:  [97.1 °F (36.2 °C)-98.2 °F (36.8  °C)] 97.8 °F (36.6 °C)  Heart Rate:  [65-88] 73  Resp:  [18-20] 18  BP: (125-130)/(72-92) 125/92  Body mass index is 20.87 kg/m².    Intake/Output Summary (Last 24 hours) at 8/3/2020 1420  Last data filed at 8/3/2020 1320  Gross per 24 hour   Intake 820 ml   Output 2325 ml   Net -1505 ml     I/O this shift:  In: 100 [IV Piggyback:100]  Out: 250 [Urine:250]     Physical Exam:   General: patient awake, alert and cooperative   Eyes: Normal lids and lashes, no scleral icterus   Neck: supple, normal ROM   Skin: warm and dry, not jaundiced   Cardiovascular: regular rhythm and rate, no murmurs auscultated   Pulm: clear to auscultation bilaterally, regular and unlabored   Abdomen: soft, nontender, nondistended; normal bowel sounds   Extremities: no rash or edema   Psychiatric: Normal mood and behavior; memory intact     Results Review:     I reviewed the patient's new clinical results.    Results from last 7 days   Lab Units 08/03/20  0730 08/02/20  0551 07/31/20  0716   WBC 10*3/mm3 13.53* 8.36 7.38   HEMOGLOBIN g/dL 12.7* 10.0* 12.4*   HEMATOCRIT % 35.9* 29.2* 36.1*   PLATELETS 10*3/mm3 364 280 295     Results from last 7 days   Lab Units 08/03/20  0730 08/02/20  1408 08/02/20  0551  07/30/20  0544 07/30/20  0019   SODIUM mmol/L 132* 141 144   < > 133* 133*   POTASSIUM mmol/L 2.7* 3.1* 6.1*   < > 3.0* 3.7   CHLORIDE mmol/L 100 108* 118*   < > 102 96*   CO2 mmol/L 21.3* 18.3* 17.5*   < > 24.0 26.8   BUN mg/dL 12 9 8   < > 16 19   CREATININE mg/dL 1.45* 1.35* 1.12   < > 1.52* 1.80*   CALCIUM mg/dL 13.5* 12.8* 10.4   < > 12.0* 14.4*   BILIRUBIN mg/dL  --   --   --   --  0.5 0.7   ALK PHOS U/L  --   --   --   --  63 83   ALT (SGPT) U/L  --   --   --   --  10 13   AST (SGOT) U/L  --   --   --   --  12 14   GLUCOSE mg/dL 106* 100* 75   < > 95 107*    < > = values in this interval not displayed.         Lab Results   Lab Value Date/Time    LIPASE 13 07/29/2020 2340       Radiology:  XR Abdomen KUB   Final Result       As  described.       This report was finalized on 8/1/2020 4:10 PM by Dr. Vinod Stauffer M.D.          XR Spine Lumbar 2 or 3 View   Final Result      CT Abdomen Pelvis With Contrast   Final Result       1. Increased stool burden seen within the rectosigmoid, in keeping with   history of constipation.   2. Worsening airspace consolidation noted at the lung bases bilaterally.   Correlation with any evidence of pneumonia is recommended.   3. Paraspinous fluid collection. This was also present on prior exam,   although it appears smaller on current study. See description above.       Radiation dose reduction techniques were utilized, including automated   exposure control and exposure modulation based on body size.       This report was finalized on 7/30/2020 1:46 AM by Dr. Padmaja Owusu M.D.          XR Abdomen KUB   Final Result   Increased stool burden within the rectal vault, in keeping with history   of constipation. Extensive bilateral pulmonary infiltrates are again   seen.       This report was finalized on 7/29/2020 9:39 PM by Dr. Padmaja Owusu M.D.              Assessment/Plan     Patient Active Problem List   Diagnosis   • Dyspnea   • Lung nodules   • Sarcoidosis of lung (CMS/HCC)   • S/P lumbar laminectomy   • Hypercalcemia   • JACKI (acute kidney injury) (CMS/HCC)   • Constipation   • Non-intractable vomiting with nausea   • Nausea       Assessment/Recommendations:    1. Nausea and vomiting.  Patient has Zofran and Phenergan ordered as needed.  EGD  to be done when OK with all MD's. His calcium level is even higher today at 13.5. I am reluctant to do an EGD now. I will put him on Carafate elixir and Pepcid 20 mg IV BID.  2. Pulmonary sarcoidosis.  Stable.  Status post methotrexate.  3. Constipation.  Stable.  This is likely due to hypercalcemia.  4. Chronic kidney disease stage III.  5. Hyperkalemia.  Plans for correction per renal.     I discussed the patients findings and my recommendations  "with patient and nursing staff.    Mart Morgan MD    Electronically signed by Mart Morgan MD at 08/03/20 1423     David Luna MD at 08/03/20 1055            Daily Progress Note.   [address]  8/3/2020    Patient:  Name:  Bonifacio Lewis  MRN:  [id number]  1970  50 y.o.  male         CC: hypercalcemia    Interval History:  Follow up sarcoidosis, hypercalcemia  Still states he is very nauseated, but doesn't appear in as much pain as yesterday  States his breathing \"is fine, not a problem with that\"      ROS: No fever, no diarrhea, no chest pain  PMFSSH: no change    Physical Exam:  /92 (BP Location: Right arm, Patient Position: Lying)   Pulse 73   Temp 97.8 °F (36.6 °C) (Oral)   Resp 18   Ht 198.1 cm (77.99\")   Wt 81.9 kg (180 lb 9.6 oz)   SpO2 96%   BMI 20.87 kg/m²    Body mass index is 20.87 kg/m².    Intake/Output Summary (Last 24 hours) at 8/3/2020 1055  Last data filed at 8/3/2020 0543  Gross per 24 hour   Intake 720 ml   Output 2075 ml   Net -1355 ml     General appearance: ill appearing  holding his abd conversant   Eyes: anicteric sclerae, moist conjunctivae; no lid-lag; PERRLA  HENT: Atraumatic; oropharynx clear with moist mucous membranes and no mucosal ulcerations; normal hard and soft palate  Neck: Trachea midline; FROM, supple, will not allow palpatoin  Lungs: CTA, with normal respiratory effort and no intercostal retractions  CV: RRR, no MRGs   Abdomen: Soft, non-tender; no masses or HSM  Extremities: No peripheral edema or extremity lymphadenopathy  Skin: Normal temperature, turgor and texture; no rash, ulcers or subcutaneous nodules  Psych: agitated affect, alert and oriented to person place    Data Review:  Notable Labs:  Results from last 7 days   Lab Units 08/03/20  0730 08/02/20  0551 07/31/20  0716 07/30/20  0544 07/30/20  0019   WBC 10*3/mm3 13.53* 8.36 7.38 6.28 8.51   HEMOGLOBIN g/dL 12.7* 10.0* 12.4* 11.7* 14.3   PLATELETS 10*3/mm3 364 280 " 295 274 326     Results from last 7 days   Lab Units 20  0730 20  1408 20  0551 20  1037 20  0544 20  0019   SODIUM mmol/L 132* 141 144 133* 133* 133*   POTASSIUM mmol/L 2.7* 3.1* 6.1* 3.2* 3.0* 3.7   CHLORIDE mmol/L 100 108* 118* 96* 102 96*   CO2 mmol/L 21.3* 18.3* 17.5* 25.4 24.0 26.8   BUN mg/dL 12 9 8 12 16 19   CREATININE mg/dL 1.45* 1.35* 1.12 1.79* 1.52* 1.80*   GLUCOSE mg/dL 106* 100* 75 92 95 107*   CALCIUM mg/dL 13.5* 12.8* 10.4 14.2* 12.0* 14.4*   MAGNESIUM mg/dL 1.5* 1.3* 0.9*  --   --   --    PHOSPHORUS mg/dL 2.5 2.5 2.4*  --   --   --    Estimated Creatinine Clearance: 71.4 mL/min (A) (by C-G formula based on SCr of 1.45 mg/dL (H)).    Results from last 7 days   Lab Units 20  0730 20  0551 20  0716 20  0544 20  0019   AST (SGOT) U/L  --   --   --  12 14   ALT (SGPT) U/L  --   --   --  10 13   PLATELETS 10*3/mm3 364 280 295 274 326             Imaging:  Reviewed chest images personally from past 3 days    Scheduled meds:      docusate sodium 100 mg Oral BID   polyethylene glycol 17 g Oral Daily   predniSONE 60 mg Oral Daily With Breakfast   senna 2 tablet Oral BID   sodium chloride 10 mL Intravenous Q12H       ASSESSMENT  /  PLAN:  1. Sarcoidosis with significant lung involvement and with hypercalcemia  2. Acute kidney injury  3. Constipation with abdominal distention and discomfort  4. Nausea and vomiting       Will hold mtx until gi issues improve  Cont pred 60 daily  Repeat ct chest when paitent able to lay still  Gi working up n/v  Calcium back up today, mgmt per nephrology as patient will allow.    Follow up with Dr Paniagua two weeks.         David Luna MD  [facility]  20  1214        Electronically signed by David Luna MD at 20 1215     Steffen Meadows MD at 20 2117              Name: Bonifacio Lewis ADMIT: 2020   : 1970  PCP: Vinod Reyna MD    MRN: [id number]  LOS: 3 days   AGE/SEX: 50 y.o. male  ROOM: [facility]     Subjective   Subjective   CC: nausea  No acute events. Patient is overall feeling better but nausea still persists. No f/c/v/d.     Objective   Objective   Vital Signs  Temp:  [97.8 °F (36.6 °C)-98.2 °F (36.8 °C)] 98.2 °F (36.8 °C)  Heart Rate:  [65-88] 88  Resp:  [18-20] 20  BP: (129-154)/(72-93) 130/75  SpO2:  [97 %-100 %] 97 %  on   ;   Device (Oxygen Therapy): room air  Body mass index is 20.87 kg/m².  Physical Exam   Constitutional: He is oriented to person, place, and time. No distress.   HENT:   Head: Normocephalic and atraumatic.   Mouth/Throat: Oropharynx is clear and moist.   Eyes: Pupils are equal, round, and reactive to light. Conjunctivae and EOM are normal.   Neck: Normal range of motion. Neck supple. No JVD present.   Cardiovascular: Normal rate, regular rhythm and intact distal pulses.   Pulmonary/Chest: Effort normal and breath sounds normal.   Abdominal: Soft. Bowel sounds are normal.   Musculoskeletal: He exhibits no edema or tenderness.   Neurological: He is alert and oriented to person, place, and time.   Skin: Skin is warm and dry. Capillary refill takes less than 2 seconds. He is not diaphoretic.   Psychiatric: He has a normal mood and affect. His behavior is normal.   Nursing note and vitals reviewed.    Results Review:       I reviewed the patient's new clinical results.  I reviewed the patient's telemetry.  Results from last 7 days   Lab Units 08/02/20  0551 07/31/20  0716 07/30/20  0544 07/30/20  0019   WBC 10*3/mm3 8.36 7.38 6.28 8.51   HEMOGLOBIN g/dL 10.0* 12.4* 11.7* 14.3   PLATELETS 10*3/mm3 280 295 274 326     Results from last 7 days   Lab Units 08/02/20  1408 08/02/20  0551 07/31/20  1037 07/30/20  0544   SODIUM mmol/L 141 144 133* 133*   POTASSIUM mmol/L 3.1* 6.1* 3.2* 3.0*   CHLORIDE mmol/L 108* 118* 96* 102   CO2 mmol/L 18.3* 17.5* 25.4 24.0   BUN mg/dL 9 8 12 16   CREATININE mg/dL 1.35* 1.12 1.79* 1.52*   GLUCOSE mg/dL 100*  75 92 95   Estimated Creatinine Clearance: 76.7 mL/min (A) (by C-G formula based on SCr of 1.35 mg/dL (H)).  Results from last 7 days   Lab Units 08/02/20  0551 07/30/20  0544 07/30/20  0019   ALBUMIN g/dL 3.20* 3.00* 3.80   BILIRUBIN mg/dL  --  0.5 0.7   ALK PHOS U/L  --  63 83   AST (SGOT) U/L  --  12 14   ALT (SGPT) U/L  --  10 13     Results from last 7 days   Lab Units 08/02/20  1408 08/02/20  0551 07/31/20  1037 07/30/20  0544 07/30/20  0019   CALCIUM mg/dL 12.8* 10.4 14.2* 12.0* 14.4*   ALBUMIN g/dL  --  3.20*  --  3.00* 3.80   MAGNESIUM mg/dL 1.3* 0.9*  --   --   --    PHOSPHORUS mg/dL 2.5 2.4*  --   --   --        Results from last 7 days   Lab Units 08/01/20  2208   COVID19  Not Detected       No results found for: HGBA1C, POCGLU    XR Abdomen KUB  Narrative: XR ABDOMEN KUB-     INDICATIONS: Nausea, vomiting, constipation     TECHNIQUE: Supine views of the abdomen     COMPARISON:  image from CT from 07/30/2020     FINDINGS:      The bowel gas pattern is nonobstructive. No supine evidence for free  intraperitoneal gas. Amount of stool in the rectosigmoid colon appears  decreased. No definite nephrolithiasis. Follow-up as indications  persist.     Persistent infiltrates in the bilateral lower lungs.           Impression:    As described.     This report was finalized on 8/1/2020 4:10 PM by Dr. Vinod Stauffer M.D.           docusate sodium 100 mg Oral BID   polyethylene glycol 17 g Oral Daily   predniSONE 60 mg Oral Daily With Breakfast   senna 2 tablet Oral BID   sodium chloride 10 mL Intravenous Q12H       sodium chloride 75 mL/hr Last Rate: 75 mL/hr (08/02/20 1718)   NPO Diet      Assessment/Plan     Active Hospital Problems    Diagnosis  POA   • **Hypercalcemia [E83.52]  Yes   • JACKI (acute kidney injury) (CMS/HCC) [N17.9]  Yes   • Constipation [K59.00]  Yes   • Non-intractable vomiting with nausea [R11.2]  Yes   • Nausea [R11.0]  Unknown   • Sarcoidosis of lung (CMS/HCC) [D86.0]  Yes   • S/P  lumbar laminectomy [Z98.890]  Not Applicable      Resolved Hospital Problems   No resolved problems to display.   Hypercalcemia  - due to sarcoidosis and the source of most of his symptoms  - slow to respond but treatment has been delayed due to poor patient compliance  - continue on IVF and calcitonin  - appreciate nephrology recs     Constipation  - resolved, continue on bowel regimen  - appreciate GI recs, planning EGD possibly tomorrow pending electrolytes     JACKI  - probably due to volume depletion/hypercalcemia  - improved with IVF     Sarcoidosis  - pulmonary status appears stable  - continue on current dose of prednisone  - holding methotrexate until GI issues improve  - appreciate pulmonology recs, f/u with Dr. Paniagua in 2 weeks     S/P lumbar Laminectomy  - Dr. Bustillo has evaluated, appreciate recs-continue brace and f/u with him in 2 months    SCDs for DVT prophylaxis.  Full code.  Discussed with patient and nursing staff.  Anticipate discharge TBD.  timing yet to be determined.      Steffen Meadows MD  [facility]  08/02/20  21:17    Patient was wearing facemask when I entered the room and throughout our encounter.  I wore protective equipment throughout this patient encounter including a face mask, gloves and protective eyewear.  Hand hygiene was performed before donning protective equipment and after removal when leaving the room.        Electronically signed by Steffen Meadows MD at 08/02/20 0308     David Luna MD at 08/02/20 1104            Daily Progress Note.   [address]  8/2/2020    Patient:  Name:  Bonifacio Lewis  MRN:  [id number]  1970  50 y.o.  male         CC: hypercalcemia    Interval History:  Follow up sarcoidosis, hypercalcemia  abd pain, nausea emesis  No worsening soa    ROS: No fever, no diarrhea, no chest pain  PMFSSH: no change    Physical Exam:  /90 (BP Location: Right arm, Patient Position: Lying)   Pulse 71    "Temp 98.1 °F (36.7 °C) (Oral)   Resp 18   Ht 198.1 cm (77.99\")   Wt 81.9 kg (180 lb 9.6 oz)   SpO2 98%   BMI 20.87 kg/m²    Body mass index is 20.87 kg/m².    Intake/Output Summary (Last 24 hours) at 8/2/2020 1104  Last data filed at 8/2/2020 0757  Gross per 24 hour   Intake --   Output 4125 ml   Net -4125 ml     General appearance: ill appearing in discomfort holding his abd conversant   Eyes: anicteric sclerae, moist conjunctivae; no lid-lag; PERRLA  HENT: Atraumatic; oropharynx clear with moist mucous membranes and no mucosal ulcerations; normal hard and soft palate  Neck: Trachea midline; FROM, supple, will not allow palpatoin  Lungs: CTA, with normal respiratory effort and no intercostal retractions  CV: RRR, no MRGs   Abdomen: Soft, non-tender; no masses or HSM  Extremities: No peripheral edema or extremity lymphadenopathy  Skin: Normal temperature, turgor and texture; no rash, ulcers or subcutaneous nodules  Psych: agitated affect, alert and oriented to person place    Data Review:  Notable Labs:  Results from last 7 days   Lab Units 08/02/20  0551 07/31/20  0716 07/30/20  0544 07/30/20  0019   WBC 10*3/mm3 8.36 7.38 6.28 8.51   HEMOGLOBIN g/dL 10.0* 12.4* 11.7* 14.3   PLATELETS 10*3/mm3 280 295 274 326     Results from last 7 days   Lab Units 08/02/20  0551 07/31/20  1037 07/30/20  0544 07/30/20  0019   SODIUM mmol/L 144 133* 133* 133*   POTASSIUM mmol/L 6.1* 3.2* 3.0* 3.7   CHLORIDE mmol/L 118* 96* 102 96*   CO2 mmol/L 17.5* 25.4 24.0 26.8   BUN mg/dL 8 12 16 19   CREATININE mg/dL 1.12 1.79* 1.52* 1.80*   GLUCOSE mg/dL 75 92 95 107*   CALCIUM mg/dL 10.4 14.2* 12.0* 14.4*   MAGNESIUM mg/dL 0.9*  --   --   --    PHOSPHORUS mg/dL 2.4*  --   --   --    Estimated Creatinine Clearance: 92.4 mL/min (by C-G formula based on SCr of 1.12 mg/dL).    Results from last 7 days   Lab Units 08/02/20  0551 07/31/20  0716 07/30/20  0544 07/30/20  0019   AST (SGOT) U/L  --   --  12 14   ALT (SGPT) U/L  --   --  10 13 "   PLATELETS 10*3/mm3 280 295 274 326             Imaging:  Reviewed chest images personally from past 3 days    Scheduled meds:      docusate sodium 100 mg Oral BID   magnesium sulfate 1 g Intravenous Q1H   methotrexate 5 mg Oral Weekly   polyethylene glycol 17 g Oral Daily   predniSONE 60 mg Oral Daily With Breakfast   senna 2 tablet Oral BID   sodium bicarbonate 50 mEq Intravenous Once   sodium chloride 10 mL Intravenous Q12H       ASSESSMENT  /  PLAN:  5. Sarcoidosis with significant lung involvement and with hypercalcemia  6. Acute kidney injury  7. Constipation with abdominal distention and discomfort  8. Nausea and vomiting       Will hold mtx until gi issues improve  Cont pred 60 daily  Repeat ct chest when paitent able to lay still  Gi working up n/v    Follow up with Dr Paniagua two weeks.         David Luna MD  [facility]  08/02/20  1243 PM        Electronically signed by David Luna MD at 08/02/20 1244     Rick Vidales MD at 08/02/20 1002          Gastroenterology   Inpatient Progress Note    Reason for Follow Up: Nausea and vomiting    Subjective     Interval History:   Patient still nauseated this morning.  He is wanting some water to drink.  The EGD had to be canceled due to hyperkalemia.    Current Facility-Administered Medications:   •  acetaminophen (TYLENOL) tablet 650 mg, 650 mg, Oral, Q4H PRN **OR** acetaminophen (TYLENOL) 160 MG/5ML solution 650 mg, 650 mg, Oral, Q4H PRN **OR** acetaminophen (TYLENOL) suppository 650 mg, 650 mg, Rectal, Q4H PRN, Sophia Pennington APRN  •  bisacodyl (DULCOLAX) suppository 10 mg, 10 mg, Rectal, Daily PRN, Sophia Pennington APRN, 10 mg at 07/30/20 0423  •  dextrose 5 % 925 mL with sodium bicarbonate 8.4 % 75 mEq infusion, , Intravenous, Continuous, Abril Worthy MD  •  docusate sodium (COLACE) capsule 100 mg, 100 mg, Oral, BID, Sophia Pennington APRN, 100 mg at 08/02/20 0904  •  magnesium sulfate in D5W  1g/100mL (PREMIX), 1 g, Intravenous, Q1H, Abril Worthy MD  •  methotrexate tablet 5 mg, 5 mg, Oral, Weekly, Tony Hoang MD, 5 mg at 07/31/20 1910  •  nitroglycerin (NITROSTAT) SL tablet 0.4 mg, 0.4 mg, Sublingual, Q5 Min PRN, Sophia Pennington APRN  •  ondansetron (ZOFRAN) injection 4 mg, 4 mg, Intravenous, Q6H PRN, Levi Warren MD, 4 mg at 08/02/20 0915  •  polyethylene glycol 3350 powder (packet), 17 g, Oral, Daily, Sophia Pennington APRN, 17 g at 08/02/20 0904  •  potassium chloride 10 mEq in 100 mL IVPB, 10 mEq, Intravenous, Q1H PRN, Levi Warren MD, Last Rate: 100 mL/hr at 07/31/20 1052, 10 mEq at 07/31/20 1052  •  predniSONE (DELTASONE) tablet 60 mg, 60 mg, Oral, Daily With Breakfast, Aric Mojica MD, 60 mg at 08/02/20 0904  •  prochlorperazine (COMPAZINE) injection 5 mg, 5 mg, Intravenous, Q6H PRN, Sophia Pennington APRN, 5 mg at 08/02/20 0300  •  promethazine (PHENERGAN) tablet 25 mg, 25 mg, Oral, Q6H PRN, Sophia Pennington APRN, 25 mg at 08/01/20 2336  •  senna (SENOKOT) tablet 2 tablet, 2 tablet, Oral, BID, Steffen Meadows MD, 2 tablet at 08/02/20 0904  •  sodium bicarbonate injection 8.4% 50 mEq, 50 mEq, Intravenous, Once, Abril Worthy MD  •  [COMPLETED] Insert peripheral IV, , , Once **AND** sodium chloride 0.9 % flush 10 mL, 10 mL, Intravenous, PRN, Natalio Murillo MD  •  sodium chloride 0.9 % flush 10 mL, 10 mL, Intravenous, Q12H, Sophia Pennington APRN, 10 mL at 08/01/20 2153  •  sodium chloride 0.9 % flush 10 mL, 10 mL, Intravenous, PRN, Sophia Pennington, APRN  Review of Systems:    The following systems were reviewed and negative;  constitution    Objective     Vital Signs  Temp:  [97 °F (36.1 °C)-98.9 °F (37.2 °C)] 98.1 °F (36.7 °C)  Heart Rate:  [71-93] 71  Resp:  [16-18] 18  BP: (141-164)/() 141/90  Body mass index is 20.87 kg/m².    Intake/Output Summary (Last 24 hours) at 8/2/2020 1002  Last data filed at  8/2/2020 0757  Gross per 24 hour   Intake --   Output 4625 ml   Net -4625 ml     I/O this shift:  In: -   Out: 1075 [Urine:1075]     Physical Exam:   General: patient awake, alert and cooperative   Eyes: no scleral icterus   Skin: warm and dry, not jaundiced   Abdomen: soft, nontender, nondistended; normal bowel sounds, no masses palpated, no periumbical lymphadenopathy   Psychiatric: Appropriate affect and behavior     Results Review:     I reviewed the patient's new clinical results.    Results from last 7 days   Lab Units 08/02/20  0551 07/31/20  0716 07/30/20  0544   WBC 10*3/mm3 8.36 7.38 6.28   HEMOGLOBIN g/dL 10.0* 12.4* 11.7*   HEMATOCRIT % 29.2* 36.1* 34.7*   PLATELETS 10*3/mm3 280 295 274     Results from last 7 days   Lab Units 08/02/20  0551 07/31/20  1037 07/30/20  0544 07/30/20  0019   SODIUM mmol/L 144 133* 133* 133*   POTASSIUM mmol/L 6.1* 3.2* 3.0* 3.7   CHLORIDE mmol/L 118* 96* 102 96*   CO2 mmol/L 17.5* 25.4 24.0 26.8   BUN mg/dL 8 12 16 19   CREATININE mg/dL 1.12 1.79* 1.52* 1.80*   CALCIUM mg/dL 10.4 14.2* 12.0* 14.4*   BILIRUBIN mg/dL  --   --  0.5 0.7   ALK PHOS U/L  --   --  63 83   ALT (SGPT) U/L  --   --  10 13   AST (SGOT) U/L  --   --  12 14   GLUCOSE mg/dL 75 92 95 107*         Lab Results   Lab Value Date/Time    LIPASE 13 07/29/2020 2340       Radiology:  XR Abdomen KUB   Final Result       As described.       This report was finalized on 8/1/2020 4:10 PM by Dr. Vinod Stauffer M.D.          XR Spine Lumbar 2 or 3 View   Final Result      CT Abdomen Pelvis With Contrast   Final Result       1. Increased stool burden seen within the rectosigmoid, in keeping with   history of constipation.   2. Worsening airspace consolidation noted at the lung bases bilaterally.   Correlation with any evidence of pneumonia is recommended.   3. Paraspinous fluid collection. This was also present on prior exam,   although it appears smaller on current study. See description above.       Radiation  dose reduction techniques were utilized, including automated   exposure control and exposure modulation based on body size.       This report was finalized on 7/30/2020 1:46 AM by Dr. Padmaja Owusu M.D.          XR Abdomen KUB   Final Result   Increased stool burden within the rectal vault, in keeping with history   of constipation. Extensive bilateral pulmonary infiltrates are again   seen.       This report was finalized on 7/29/2020 9:39 PM by Dr. Padmaja Owusu M.D.              Assessment/Plan     Patient Active Problem List   Diagnosis   • Dyspnea   • Lung nodules   • Sarcoidosis of lung (CMS/HCC)   • S/P lumbar laminectomy   • Hypercalcemia   • JACKI (acute kidney injury) (CMS/HCC)   • Constipation   • Non-intractable vomiting with nausea   • Nausea       Assessment:  1. Nausea and vomiting.  Patient has Zofran and Phenergan ordered as needed.  EGD canceled today due to hyperkalemia  2. Pulmonary sarcoidosis.  Stable.  Status post methotrexate.  3. Constipation.  Stable.  This is likely due to hypercalcemia.  4. Chronic kidney disease stage III.  5. Hyperkalemia.  Plans for correction per renal.      Plan:  · Correction of electrolytes per renal.  · Plan EGD possibly tomorrow if electrolytes improved.  · Continue Zofran and Phenergan.  · Continue MiraLAX and Senokot for constipation.  I discussed the patients findings and my recommendations with patient and nursing staff.    MD Rick Oropeza M.D.  [facility] [address]  Office: [phone]    Electronically signed by Rick Vidales MD at 08/02/20 7696     Abril Worthy MD at 08/02/20 3950             LOS: 3 days    Patient Care Team:  Vinod Reyna MD as PCP - General (Internal Medicine)    Chief Complaint:    Chief Complaint   Patient presents with   • Constipation   • Nausea     Follow UP JACKI CKD3  Subjective     Interval  "History:   Nausea, pain.  No dyspnea.  Voiding well.  Still refusing some medications.  Critical lab values returned this morning.    Objective     Vital Signs  Temp:  [97 °F (36.1 °C)-98.9 °F (37.2 °C)] 98.1 °F (36.7 °C)  Heart Rate:  [71-93] 71  Resp:  [16-18] 18  BP: (141-164)/() 141/90    Flowsheet Rows      First Filed Value   Admission Height  198.1 cm (78\") Documented at 07/29/2020 2005   Admission Weight  82.7 kg (182 lb 6.4 oz) Documented at 07/29/2020 2237          I/O this shift:  In: -   Out: 1075 [Urine:1075]  I/O last 3 completed shifts:  In: -   Out: 4850 [Urine:4850]    Intake/Output Summary (Last 24 hours) at 8/2/2020 0927  Last data filed at 8/2/2020 0757  Gross per 24 hour   Intake --   Output 4625 ml   Net -4625 ml       Physical Exam:  GEN, obviously uncomfortable with nausea.  Lying in bed.  Poor eye contact.   Neck supple no JVD  Lungs CTA bilat no rales, poor effort.  CV RRR no m/g  Abd soft NT/ND  vasc no pedal/ankle edema  Skin is warm and dry.     Results Review:    Results from last 7 days   Lab Units 08/02/20  0551 07/31/20  1037 07/30/20  0544 07/30/20  0019   SODIUM mmol/L 144 133* 133* 133*   POTASSIUM mmol/L 6.1* 3.2* 3.0* 3.7   CHLORIDE mmol/L 118* 96* 102 96*   CO2 mmol/L 17.5* 25.4 24.0 26.8   BUN mg/dL 8 12 16 19   CREATININE mg/dL 1.12 1.79* 1.52* 1.80*   CALCIUM mg/dL 10.4 14.2* 12.0* 14.4*   BILIRUBIN mg/dL  --   --  0.5 0.7   ALK PHOS U/L  --   --  63 83   ALT (SGPT) U/L  --   --  10 13   AST (SGOT) U/L  --   --  12 14   GLUCOSE mg/dL 75 92 95 107*       Estimated Creatinine Clearance: 92.4 mL/min (by C-G formula based on SCr of 1.12 mg/dL).    Results from last 7 days   Lab Units 08/02/20  0551   MAGNESIUM mg/dL 0.9*   PHOSPHORUS mg/dL 2.4*             Results from last 7 days   Lab Units 08/02/20  0551 07/31/20  0716 07/30/20  0544 07/30/20  0019   WBC 10*3/mm3 8.36 7.38 6.28 8.51   HEMOGLOBIN g/dL 10.0* 12.4* 11.7* 14.3   PLATELETS 10*3/mm3 280 295 274 326         "       Imaging Results (Last 24 Hours)     Procedure Component Value Units Date/Time    XR Abdomen KUB [[id number]] Collected:  08/01/20 1605     Updated:  08/01/20 1614    Narrative:       XR ABDOMEN KUB-     INDICATIONS: Nausea, vomiting, constipation     TECHNIQUE: Supine views of the abdomen     COMPARISON:  image from CT from 07/30/2020     FINDINGS:      The bowel gas pattern is nonobstructive. No supine evidence for free  intraperitoneal gas. Amount of stool in the rectosigmoid colon appears  decreased. No definite nephrolithiasis. Follow-up as indications  persist.     Persistent infiltrates in the bilateral lower lungs.             Impression:          As described.     This report was finalized on 8/1/2020 4:10 PM by Dr. Vinod Stauffer M.D.             docusate sodium 100 mg Oral BID   magnesium sulfate 1 g Intravenous Q1H   methotrexate 5 mg Oral Weekly   polyethylene glycol 17 g Oral Daily   predniSONE 60 mg Oral Daily With Breakfast   senna 2 tablet Oral BID   sodium bicarbonate 50 mEq Intravenous Once   sodium chloride 10 mL Intravenous Q12H       custom IV infusion builder        Medication Review:   Current Facility-Administered Medications   Medication Dose Route Frequency Provider Last Rate Last Dose   • acetaminophen (TYLENOL) tablet 650 mg  650 mg Oral Q4H PRN Sophia Pennington APRN        Or   • acetaminophen (TYLENOL) 160 MG/5ML solution 650 mg  650 mg Oral Q4H PRN Sophia Pennington APRN        Or   • acetaminophen (TYLENOL) suppository 650 mg  650 mg Rectal Q4H PRN Sophia Pennington APRN       • bisacodyl (DULCOLAX) suppository 10 mg  10 mg Rectal Daily PRN Sophia Pennington APRN   10 mg at 07/30/20 0423   • dextrose 5 % 925 mL with sodium bicarbonate 8.4 % 75 mEq infusion   Intravenous Continuous Abril Worthy MD       • docusate sodium (COLACE) capsule 100 mg  100 mg Oral BID Sophia Pennington APRN   100 mg at 08/02/20 0904   • magnesium sulfate in D5W 1g/100mL  (PREMIX)  1 g Intravenous Q1H Abril Worthy MD       • methotrexate tablet 5 mg  5 mg Oral Weekly Tony Hoang MD   5 mg at 07/31/20 1910   • nitroglycerin (NITROSTAT) SL tablet 0.4 mg  0.4 mg Sublingual Q5 Min PRN Sophia Pennington APRN       • ondansetron (ZOFRAN) injection 4 mg  4 mg Intravenous Q6H PRN Levi Warren MD   4 mg at 08/02/20 0915   • polyethylene glycol 3350 powder (packet)  17 g Oral Daily Sophia Pennington APRN   17 g at 08/02/20 0904   • potassium chloride 10 mEq in 100 mL IVPB  10 mEq Intravenous Q1H PRN Levi Warren  mL/hr at 07/31/20 1052 10 mEq at 07/31/20 1052   • predniSONE (DELTASONE) tablet 60 mg  60 mg Oral Daily With Breakfast Aric Mojica MD   60 mg at 08/02/20 0904   • prochlorperazine (COMPAZINE) injection 5 mg  5 mg Intravenous Q6H PRN Sophia Pennington APRN   5 mg at 08/02/20 0300   • promethazine (PHENERGAN) tablet 25 mg  25 mg Oral Q6H PRN Sophia Pennington APRN   25 mg at 08/01/20 2336   • senna (SENOKOT) tablet 2 tablet  2 tablet Oral BID Steffen Meadows MD   2 tablet at 08/02/20 0904   • sodium bicarbonate injection 8.4% 50 mEq  50 mEq Intravenous Once Abril Worthy MD       • sodium chloride 0.9 % flush 10 mL  10 mL Intravenous PRN Natalio Murillo MD       • sodium chloride 0.9 % flush 10 mL  10 mL Intravenous Q12H Sophia Pennington APRN   10 mL at 08/01/20 2153   • sodium chloride 0.9 % flush 10 mL  10 mL Intravenous PRN Sophia Pennington APRN           Assessment/Plan   - JACKI on CKD III:  prerenal azotemia due to severe hypercalcemia; creatinine improved with volume expansion.   - Hypercalcemia: due to sarcoidosis.  Calcium peak 14.4, down to 10.4 this morning.  Patient refusing some oral medications due to nausea.    - Hypokalemia:  Replaced and this morning up to 6.1.    - Hyponatremia: With rapid correction to 144.    - Sarcoidosis: appreciate pulm input, status post methotrexate.  -  Chronic back pain     Plan:  Critical labs reviewed.  Potassium 6.1 (? accurate), rapid correction of sodium.  Bicarb down to 17.  Non-anion gap.  Calcium down to 10.4.  Severe hypomagnesemia.  Will change IV fluid to D5W with 75 mEq of sodium bicarb at 150 cc an hour.  Will replace magnesium IV and recheck BMP at 2 PM.  I asked to be called with results.  Repeat labs in a.m.  35min cc time.      Hypercalcemia    Sarcoidosis of lung (CMS/[facility])    S/P lumbar laminectomy    JACKI (acute kidney injury) (CMS/[facility])    Constipation    Non-intractable vomiting with nausea    Nausea      Abril Worthy MD  08/02/20  09:27      Electronically signed by Abril Worthy MD at 08/02/20 0932          Consult Notes (last 48 hours) (Notes from 08/02/20 0848 through 08/04/20 0848)      Mart Kellogg MD at 08/02/20 1246      Consult Orders    1. Inpatient Psychiatrist Consult [[id number]] ordered by Levi Warren MD at 08/01/20 1512              Psychiatry has been consulted due to patient refusing treatment and to evaluate for decisional capacity.  He has no history of mental illness.  Apparently, he had refused to have his blood drawn x5 yesterday.  There have been some difficulties with his IV.  Nursing noted last night that he had requested to wait on his COVID swab test before having additional treatment.  Last night, his COVID test came back negative.    I spoke with the patient's nurse today.  He reports the patient has been fully compliant today.  He has not refused any treatment.  His a.m. labs were collected and are abnormal.  There do not appear to be any further concerns for the patient's decisional capacity.  If additional concerns should arise please reconsult psychiatry.    Electronically signed by Mart Kellogg MD at 08/02/20 9827

## 2020-08-04 NOTE — PROGRESS NOTES
"Adult Nutrition  Assessment/PES    Patient Name:  Bonifacio Lewis  YOB: 1970  MRN: [id number]  Admit Date:  7/29/2020    Assessment Date:  8/4/2020    Comments:  Nutrition follow up.  Refusing treatment at times.  Continues with nausea.  2 large BMs yesterday per chart.  Calcium remains elevated.  Needs EGD, GI following.    Currently on clear liquid diet.  50% x 1 meal per chart PO data.    Due to the COVID pandemic, nutrition assessment completed based on review of electronic medical record. This RD currently working remotely and can be reached via secure chat or email.     RD will follow as diet is advanced.    Reason for Assessment     Row Name 08/04/20 1103          Reason for Assessment    Reason For Assessment  follow-up protocol         Nutrition/Diet History     Row Name 08/04/20 1103          Nutrition/Diet History    Typical Food/Fluid Intake  CLD, still with nausea, needs EGD         Anthropometrics     Row Name 08/04/20 1103 08/04/20 0522       Anthropometrics    Height  198.1 cm (77.99\")  --    Weight  --  80.5 kg (177 lb 8 oz)       Admit Weight    Admit Weight  -- 177# 8/4  --       Ideal Body Weight (IBW)    Ideal Body Weight (IBW) (kg)  98.81  --       Body Mass Index (BMI)    BMI Assessment  BMI 18.5-24.9: normal 20.46  --        Labs/Tests/Procedures/Meds     Row Name 08/04/20 1104          Labs/Procedures/Meds    Lab Results Reviewed  reviewed, pertinent     Lab Results Comments  Creat, Ca, WBC, Hgb, Hct        Diagnostic Tests/Procedures    Diagnostic Test/Procedure Reviewed  reviewed, pertinent     Diagnostic Test/Procedures Comments  needs EGD        Medications    Pertinent Medications Reviewed  reviewed, pertinent     Pertinent Medications Comments  colace, pepcid, laxative, prednisone, senokot, carafate, IVFs         Physical Findings     Row Name 08/04/20 1103          Physical Findings    Gastrointestinal  nausea     Skin  surgical incision B=19         Estimated/Assessed " "Needs     Row Name 08/04/20 1103          Calculation Measurements    Height  198.1 cm (77.99\")         Nutrition Prescription Ordered     Row Name 08/04/20 1105          Nutrition Prescription PO    Current PO Diet  Clear Liquid         Evaluation of Received Nutrient/Fluid Intake     Row Name 08/04/20 1105          PO Evaluation    Number of Meals  1     % PO Intake  50         Problem/Interventions:    Problem 2     Row Name 08/04/20 1105          Nutrition Diagnoses Problem 2    Problem 2  Inadequate Intake/Infusion     Inadequate Intake Type  Oral     Macronutrient  Kcal;Protein     Etiology (related to)  MNT for Treatment/Condition     Signs/Symptoms (evidenced by)  Clear Liquid Diet         Intervention Goal     Row Name 08/04/20 1105          Intervention Goal    General  Maintain nutrition;Reduce/improve symptoms;Disease management/therapy;Meet nutritional needs for age/condition     PO  Tolerate PO;Increase intake;Advance diet;PO intake (%)     PO Intake %  75 %     Weight  Maintain weight         Nutrition Intervention     Row Name 08/04/20 1106          Nutrition Intervention    RD/Tech Action  Follow Tx progress;Care plan reviewd         Education/Evaluation     Row Name 08/04/20 1106          Education    Education  Will Instruct as appropriate        Monitor/Evaluation    Monitor  Per protocol;I&O;Pertinent labs;Weight;Skin status;Symptoms           Electronically signed by:  Trudy Lee RD  08/04/20 11:06  "

## 2020-08-04 NOTE — PLAN OF CARE
"Denies pain. Nausea \"tolerable\" per pt. Appetite fair to poor. CT chest completed. Dual midline left upper arm intact. Telemetry SR.  Problem: Patient Care Overview  Goal: Plan of Care Review  Outcome: Ongoing (interventions implemented as appropriate)  Goal: Individualization and Mutuality  Outcome: Ongoing (interventions implemented as appropriate)  Goal: Discharge Needs Assessment  Outcome: Ongoing (interventions implemented as appropriate)  Goal: Interprofessional Rounds/Family Conf  Outcome: Ongoing (interventions implemented as appropriate)     Problem: Patient Care Overview  Goal: Plan of Care Review  Outcome: Ongoing (interventions implemented as appropriate)  Goal: Individualization and Mutuality  Outcome: Ongoing (interventions implemented as appropriate)  Goal: Discharge Needs Assessment  Outcome: Ongoing (interventions implemented as appropriate)  Goal: Interprofessional Rounds/Family Conf  Outcome: Ongoing (interventions implemented as appropriate)     Problem: Constipation (Adult)  Goal: Effective Bowel Elimination  Outcome: Ongoing (interventions implemented as appropriate)  Goal: Comfort  Outcome: Ongoing (interventions implemented as appropriate)     Problem: Pain, Acute (Adult)  Goal: Acceptable Pain Control/Comfort Level  Outcome: Ongoing (interventions implemented as appropriate)     Problem: Nutrition, Imbalanced: Inadequate Oral Intake (Adult)  Goal: Improved Oral Intake  Outcome: Ongoing (interventions implemented as appropriate)  Goal: Prevent Further Weight Loss  Outcome: Ongoing (interventions implemented as appropriate)     Problem: Nausea/Vomiting (Adult)  Goal: Symptom Relief  Outcome: Ongoing (interventions implemented as appropriate)  Goal: Adequate Hydration  Outcome: Ongoing (interventions implemented as appropriate)     Problem: Skin Injury Risk (Adult)  Goal: Skin Health and Integrity  Outcome: Ongoing (interventions implemented as appropriate)     "

## 2020-08-04 NOTE — PROGRESS NOTES
"   LOS: 5 days    Patient Care Team:  Vinod Reyna MD as PCP - General (Internal Medicine)    Chief Complaint:    Chief Complaint   Patient presents with   • Constipation   • Nausea     Follow-up acute kidney injury on chronic kidney disease and severe hypercalcemia  Subjective     Interval History:   Stools very loose.  Taking clear liquids.  Got pamidronate last night. Not soa. Nausea still there, but better.     Objective     Vital Signs  Temp:  [97.1 °F (36.2 °C)-99 °F (37.2 °C)] 97.6 °F (36.4 °C)  Heart Rate:  [65-71] 71  Resp:  [16-18] 18  BP: (131-149)/(77-88) 134/79    Flowsheet Rows      First Filed Value   Admission Height  198.1 cm (78\") Documented at 07/29/2020 2005   Admission Weight  82.7 kg (182 lb 6.4 oz) Documented at 07/29/2020 2237          I/O this shift:  In: 600 [P.O.:600]  Out: -   I/O last 3 completed shifts:  In: 100 [IV Piggyback:100]  Out: 3225 [Urine:3225]    Intake/Output Summary (Last 24 hours) at 8/4/2020 1541  Last data filed at 8/4/2020 1300  Gross per 24 hour   Intake 600 ml   Output 1450 ml   Net -850 ml       Physical Exam:  General Appearance: alert, oriented x 3, no acute distress   Skin: warm and dry  HEENT: pupils round and reactive to light, oral mucosa moist  Neck: supple, no JVD, trachea midline  Lungs:clear to auscultation, no wheezing .  Heart: RRR, normal S1 and S2, no S3, no rub  Abdomen: soft, nontender, + bs, no body wall edema  Extremities: no edema, cyanosis or clubbing  Neuro: normal speech and mental status         Results Review:    Results from last 7 days   Lab Units 08/04/20  0723 08/03/20  0730 08/02/20  1408  07/30/20  0544 07/30/20  0019   SODIUM mmol/L 138 132* 141   < > 133* 133*   POTASSIUM mmol/L 3.7 2.7* 3.1*   < > 3.0* 3.7   CHLORIDE mmol/L 109* 100 108*   < > 102 96*   CO2 mmol/L 20.1* 21.3* 18.3*   < > 24.0 26.8   BUN mg/dL 13 12 9   < > 16 19   CREATININE mg/dL 1.36* 1.45* 1.35*   < > 1.52* 1.80*   CALCIUM mg/dL 12.4* 13.5* 12.8*   < > 12.0* " 14.4*   BILIRUBIN mg/dL  --   --   --   --  0.5 0.7   ALK PHOS U/L  --   --   --   --  63 83   ALT (SGPT) U/L  --   --   --   --  10 13   AST (SGOT) U/L  --   --   --   --  12 14   GLUCOSE mg/dL 87 106* 100*   < > 95 107*    < > = values in this interval not displayed.       Estimated Creatinine Clearance: 74.8 mL/min (A) (by C-G formula based on SCr of 1.36 mg/dL (H)).    Results from last 7 days   Lab Units 08/04/20  0723 08/03/20  0730 08/02/20  1408   MAGNESIUM mg/dL 1.8 1.5* 1.3*   PHOSPHORUS mg/dL 2.8 2.5 2.5       Results from last 7 days   Lab Units 08/04/20  0723   URIC ACID mg/dL 10.3*       Results from last 7 days   Lab Units 08/03/20  0730 08/02/20  0551 07/31/20  0716 07/30/20  0544 07/30/20  0019   WBC 10*3/mm3 13.53* 8.36 7.38 6.28 8.51   HEMOGLOBIN g/dL 12.7* 10.0* 12.4* 11.7* 14.3   PLATELETS 10*3/mm3 364 280 295 274 326               Imaging Results (Last 24 Hours)     ** No results found for the last 24 hours. **          famotidine 20 mg Intravenous Q12H   predniSONE 60 mg Oral Daily With Breakfast   sodium chloride 10 mL Intravenous Q12H   sucralfate 1 g Oral 4x Daily AC & at Bedtime       sodium chloride 125 mL/hr Last Rate: 125 mL/hr (08/04/20 0902)       Medication Review:   Current Facility-Administered Medications   Medication Dose Route Frequency Provider Last Rate Last Dose   • acetaminophen (TYLENOL) tablet 650 mg  650 mg Oral Q4H PRN Sophia Pennington APRN        Or   • acetaminophen (TYLENOL) 160 MG/5ML solution 650 mg  650 mg Oral Q4H PRN Sophia Pennington APRN        Or   • acetaminophen (TYLENOL) suppository 650 mg  650 mg Rectal Q4H PRN Sophia Pennington APRN       • bisacodyl (DULCOLAX) suppository 10 mg  10 mg Rectal Daily PRN Sophia Pennington APRN   10 mg at 07/30/20 0423   • docusate sodium (COLACE) capsule 100 mg  100 mg Oral BID PRN Kathie Vasques MD       • famotidine (PEPCID) injection 20 mg  20 mg Intravenous Q12H Mart Morgan MD   20 mg at 08/04/20  0903   • nitroglycerin (NITROSTAT) SL tablet 0.4 mg  0.4 mg Sublingual Q5 Min PRN Sophia Pennington APRN       • ondansetron (ZOFRAN) injection 4 mg  4 mg Intravenous Q6H PRN Levi Warren MD   4 mg at 08/03/20 1153   • polyethylene glycol 3350 powder (packet)  17 g Oral Daily PRN Kathie Vasques MD       • potassium chloride 10 mEq in 100 mL IVPB  10 mEq Intravenous Q1H PRN Levi Warren  mL/hr at 08/03/20 1553 10 mEq at 08/03/20 1553   • predniSONE (DELTASONE) tablet 60 mg  60 mg Oral Daily With Breakfast Aric Mojica MD   60 mg at 08/04/20 0903   • prochlorperazine (COMPAZINE) injection 5 mg  5 mg Intravenous Q6H PRN Sophia Pennington APRN   5 mg at 08/04/20 0001   • promethazine (PHENERGAN) tablet 25 mg  25 mg Oral Q6H PRN Sophia Pennington APRN   25 mg at 08/02/20 1738   • senna (SENOKOT) tablet 2 tablet  2 tablet Oral Nightly PRN Kathie Vasques MD       • sodium chloride 0.9 % flush 10 mL  10 mL Intravenous PRN Natalio Murillo MD       • sodium chloride 0.9 % flush 10 mL  10 mL Intravenous Q12H Sophia Pennington APRN   10 mL at 08/04/20 0903   • sodium chloride 0.9 % flush 10 mL  10 mL Intravenous PRN Sophia Pennington APRN   10 mL at 08/04/20 0904   • sodium chloride 0.9 % infusion  125 mL/hr Intravenous Continuous [place]  mL/hr at 08/04/20 0902 125 mL/hr at 08/04/20 0902   • sucralfate (CARAFATE) tablet 1 g  1 g Oral 4x Daily AC & at Bedtime Mart Morgan MD   1 g at 08/04/20 1216       Assessment/Plan    1.  JACKI chronic kidney disease stage IV associated with severe hypercalcemia, creatinine better, calcium improved.  SP pamidronate.    2.  Hypercalcemia associated with sarcoidosis calcium initially was 14.4 down to 10.4 and today up to 13.5  3.  Hypokalemia potassium today 3.7.  4.  Hyponatremia resolved.   5.  Sarcoidosis  6.  Chronic back pain      Plan:  1.  Continue IVF today  2.  Change stool softeners to prn.          Kathie Vasques MD  08/04/20  15:41

## 2020-08-04 NOTE — PLAN OF CARE
"Patient is resting abed without c/o anything at this time, other than \"I want to go home today\". Lungs are clear to auscultation and without coughing or congestion noted. His abdomen is soft with +BS in all quadrants, and he continues with nausea. Patient is tolerating his IV fluids and is voiding without difficulty. Nursing will monitor labs for return to normal.     "

## 2020-08-04 NOTE — PROGRESS NOTES
[facility]  Inpatient Progress Note    Reason for Follow Up:  Nausea and vomiting    Subjective     Interval History:   Nausea much better. Ca level down to 12/4 today. No abdominal or chest pain.     Current Facility-Administered Medications:   •  acetaminophen (TYLENOL) tablet 650 mg, 650 mg, Oral, Q4H PRN **OR** acetaminophen (TYLENOL) 160 MG/5ML solution 650 mg, 650 mg, Oral, Q4H PRN **OR** acetaminophen (TYLENOL) suppository 650 mg, 650 mg, Rectal, Q4H PRN, Sophia Pennington APRN  •  bisacodyl (DULCOLAX) suppository 10 mg, 10 mg, Rectal, Daily PRN, Sophia Pennington APRN, 10 mg at 07/30/20 0423  •  docusate sodium (COLACE) capsule 100 mg, 100 mg, Oral, BID, Sophia Pennington APRN, 100 mg at 08/02/20 2129  •  famotidine (PEPCID) injection 20 mg, 20 mg, Intravenous, Q12H, Mart Morgan MD, 20 mg at 08/04/20 0903  •  nitroglycerin (NITROSTAT) SL tablet 0.4 mg, 0.4 mg, Sublingual, Q5 Min PRN, Sophia Pennington APRN  •  ondansetron (ZOFRAN) injection 4 mg, 4 mg, Intravenous, Q6H PRN, Levi Warren MD, 4 mg at 08/03/20 1153  •  polyethylene glycol 3350 powder (packet), 17 g, Oral, Daily, Sophia Pennington APRN, 17 g at 08/02/20 0904  •  potassium chloride 10 mEq in 100 mL IVPB, 10 mEq, Intravenous, Q1H PRN, Levi Warren MD, Last Rate: 100 mL/hr at 08/03/20 1553, 10 mEq at 08/03/20 1553  •  predniSONE (DELTASONE) tablet 60 mg, 60 mg, Oral, Daily With Breakfast, Aric Mojica MD, 60 mg at 08/04/20 0903  •  prochlorperazine (COMPAZINE) injection 5 mg, 5 mg, Intravenous, Q6H PRN, Sophia Pennington APRN, 5 mg at 08/04/20 0001  •  promethazine (PHENERGAN) tablet 25 mg, 25 mg, Oral, Q6H PRN, Sophia Pennington APRN, 25 mg at 08/02/20 1738  •  senna (SENOKOT) tablet 2 tablet, 2 tablet, Oral, BID, Steffen Meadows MD, 2 tablet at 08/02/20 2129  •  [COMPLETED] Insert peripheral IV, , , Once **AND** sodium chloride 0.9 % flush 10 mL, 10 mL,  Intravenous, PRN, Natalio Murillo MD  •  sodium chloride 0.9 % flush 10 mL, 10 mL, Intravenous, Q12H, Sophia Pennington APRN, 10 mL at 08/04/20 0903  •  sodium chloride 0.9 % flush 10 mL, 10 mL, Intravenous, PRN, Sophia Pennington APRN, 10 mL at 08/04/20 0904  •  sodium chloride 0.9 % infusion, 125 mL/hr, Intravenous, Continuous, [place] MD, Last Rate: 125 mL/hr at 08/04/20 0902, 125 mL/hr at 08/04/20 0902  •  sucralfate (CARAFATE) tablet 1 g, 1 g, Oral, 4x Daily AC & at Bedtime, Mart Morgan MD, 1 g at 08/04/20 0730  Review of Systems:    The following systems were reviewed and negative;  constitution, ENT, respiratory, cardiovascular, breast, hematologic / lymphatic, musculoskeletal and neurological    Objective     Vital Signs  Temp:  [97.1 °F (36.2 °C)-99 °F (37.2 °C)] 97.2 °F (36.2 °C)  Heart Rate:  [65-66] 65  Resp:  [16-18] 16  BP: (131-149)/(77-88) 131/80  Body mass index is 20.52 kg/m².    Intake/Output Summary (Last 24 hours) at 8/4/2020 1125  Last data filed at 8/4/2020 0900  Gross per 24 hour   Intake 340 ml   Output 1950 ml   Net -1610 ml     I/O this shift:  In: 240 [P.O.:240]  Out: -      Physical Exam:   General: patient awake, alert and cooperative   Eyes: Normal lids and lashes, no scleral icterus   Neck: supple, normal ROM   Skin: warm and dry, not jaundiced   Cardiovascular: regular rhythm and rate, no murmurs auscultated   Pulm: clear to auscultation bilaterally, regular and unlabored   Abdomen: soft, nontender, nondistended; normal bowel sounds   Extremities: no rash or edema   Psychiatric: Normal mood and behavior; memory intact     Results Review:     I reviewed the patient's new clinical results.    Results from last 7 days   Lab Units 08/03/20 0730 08/02/20  0551 07/31/20  0716   WBC 10*3/mm3 13.53* 8.36 7.38   HEMOGLOBIN g/dL 12.7* 10.0* 12.4*   HEMATOCRIT % 35.9* 29.2* 36.1*   PLATELETS 10*3/mm3 364 280 295     Results from last 7 days   Lab Units  08/04/20  0723 08/03/20  0730 08/02/20  1408  07/30/20  0544 07/30/20  0019   SODIUM mmol/L 138 132* 141   < > 133* 133*   POTASSIUM mmol/L 3.7 2.7* 3.1*   < > 3.0* 3.7   CHLORIDE mmol/L 109* 100 108*   < > 102 96*   CO2 mmol/L 20.1* 21.3* 18.3*   < > 24.0 26.8   BUN mg/dL 13 12 9   < > 16 19   CREATININE mg/dL 1.36* 1.45* 1.35*   < > 1.52* 1.80*   CALCIUM mg/dL 12.4* 13.5* 12.8*   < > 12.0* 14.4*   BILIRUBIN mg/dL  --   --   --   --  0.5 0.7   ALK PHOS U/L  --   --   --   --  63 83   ALT (SGPT) U/L  --   --   --   --  10 13   AST (SGOT) U/L  --   --   --   --  12 14   GLUCOSE mg/dL 87 106* 100*   < > 95 107*    < > = values in this interval not displayed.         Lab Results   Lab Value Date/Time    LIPASE 13 07/29/2020 2340       Radiology:  XR Abdomen KUB   Final Result       As described.       This report was finalized on 8/1/2020 4:10 PM by Dr. Vinod Stauffer M.D.          XR Spine Lumbar 2 or 3 View   Final Result      CT Abdomen Pelvis With Contrast   Final Result       1. Increased stool burden seen within the rectosigmoid, in keeping with   history of constipation.   2. Worsening airspace consolidation noted at the lung bases bilaterally.   Correlation with any evidence of pneumonia is recommended.   3. Paraspinous fluid collection. This was also present on prior exam,   although it appears smaller on current study. See description above.       Radiation dose reduction techniques were utilized, including automated   exposure control and exposure modulation based on body size.       This report was finalized on 7/30/2020 1:46 AM by Dr. Padmaja Owusu M.D.          XR Abdomen KUB   Final Result   Increased stool burden within the rectal vault, in keeping with history   of constipation. Extensive bilateral pulmonary infiltrates are again   seen.       This report was finalized on 7/29/2020 9:39 PM by Dr. Padmaja Owusu M.D.              Assessment/Plan     Patient Active Problem List    Diagnosis   • Dyspnea   • Lung nodules   • Sarcoidosis of lung (CMS/HCC)   • S/P lumbar laminectomy   • Hypercalcemia   • JACKI (acute kidney injury) (CMS/HCC)   • Constipation   • Non-intractable vomiting with nausea   • Nausea       Assessment/Recommendations:    1. Nausea and vomiting.  Patient has Zofran and Phenergan ordered as needed.  EGD to be done when OK with all MD's. His calcium level is down somewhat to 12.4 (still elevated). His nausea is better. I am not sure if it is from the Carafate elixir and Pepcid 20 mg IV BID or if it from the lower calcium.   2. Pulmonary sarcoidosis.  Stable.  Status post methotrexate.  3. Constipation.  Stable.  This is likely due to hypercalcemia.  4. Chronic kidney disease stage III.  5. Hyperkalemia.  Plans for correction per renal.    I discussed the patients findings and my recommendations with patient and primary care team.    Mart oMrgan MD

## 2020-08-04 NOTE — PROGRESS NOTES
"  Daily Progress Note.   [address]  8/4/2020    Patient:  Name:  Bonifacio Lewis  MRN:  [id number]  1970  50 y.o.  male         CC: hypercalcemia    Interval History:  Follow up sarcoidosis, hypercalcemia  Less distress today, asking for sherbert icecream  doenst appear in pain today  No soa, no cough, no hemoptysis      ROS: No fever, no diarrhea  ea, no chest pain  PMFSSH: no change    Physical Exam:  /80 (BP Location: Right arm, Patient Position: Lying)   Pulse 65   Temp 97.2 °F (36.2 °C) (Oral)   Resp 16   Ht 198.1 cm (77.99\")   Wt 80.5 kg (177 lb 8 oz)   SpO2 100%   BMI 20.52 kg/m²   Body mass index is 20.52 kg/m².    Intake/Output Summary (Last 24 hours) at 8/4/2020 1108  Last data filed at 8/4/2020 0900  Gross per 24 hour   Intake 340 ml   Output 1950 ml   Net -1610 ml     General appearance: well appearing  conversant   Eyes: anicteric sclerae, moist conjunctivae; no lid-lag; PERRLA  HENT: Atraumatic; oropharynx clear with moist mucous membranes and no mucosal ulcerations; normal hard and soft palate  Neck: Trachea midline; FROM, supple, will not allow palpatoin  Lungs: CTA, with normal respiratory effort and no intercostal retractions  CV: RRR, no MRGs   Abdomen: Soft, non-tender; no masses or HSM  Extremities: No peripheral edema or extremity lymphadenopathy  Skin: Normal temperature, turgor and texture; no rash, ulcers or subcutaneous nodules  Psych: calm affect, alert and oriented to person place    Data Review:  Notable Labs:  Results from last 7 days   Lab Units 08/03/20  0730 08/02/20  0551 07/31/20  0716 07/30/20  0544 07/30/20  0019   WBC 10*3/mm3 13.53* 8.36 7.38 6.28 8.51   HEMOGLOBIN g/dL 12.7* 10.0* 12.4* 11.7* 14.3   PLATELETS 10*3/mm3 364 280 295 274 326     Results from last 7 days   Lab Units 08/04/20  0723 08/03/20  0730 08/02/20  1408 08/02/20  0551 07/31/20  1037 07/30/20  0544 07/30/20  0019   SODIUM mmol/L 138 132* 141 144 133* 133* 133*   "   POTASSIUM mmol/L 3.7 2.7* 3.1* 6.1* 3.2* 3.0* 3.7   CHLORIDE mmol/L 109* 100 108* 118* 96* 102 96*   CO2 mmol/L 20.1* 21.3* 18.3* 17.5* 25.4 24.0 26.8   BUN mg/dL 13 12 9 8 12 16 19   CREATININE mg/dL 1.36* 1.45* 1.35* 1.12 1.79* 1.52* 1.80*   GLUCOSE mg/dL 87 106* 100* 75 92 95 107*   CALCIUM mg/dL 12.4* 13.5* 12.8* 10.4 14.2* 12.0* 14.4*   MAGNESIUM mg/dL 1.8 1.5* 1.3* 0.9*  --   --   --    PHOSPHORUS mg/dL 2.8 2.5 2.5 2.4*  --   --   --    Estimated Creatinine Clearance: 74.8 mL/min (A) (by C-G formula based on SCr of 1.36 mg/dL (H)).    Results from last 7 days   Lab Units 08/03/20  0730 08/02/20  0551 07/31/20  0716 07/30/20  0544 07/30/20  0019   AST (SGOT) U/L  --   --   --  12 14   ALT (SGPT) U/L  --   --   --  10 13   PLATELETS 10*3/mm3 364 280 295 274 326             Imaging:  Reviewed chest images personally from past 3 days    Scheduled meds:      docusate sodium 100 mg Oral BID   famotidine 20 mg Intravenous Q12H   polyethylene glycol 17 g Oral Daily   predniSONE 60 mg Oral Daily With Breakfast   senna 2 tablet Oral BID   sodium chloride 10 mL Intravenous Q12H   sucralfate 1 g Oral 4x Daily AC & at Bedtime       ASSESSMENT  /  PLAN:  1. Sarcoidosis with significant lung involvement and with hypercalcemia  2. Acute kidney injury  3. Constipation with abdominal distention and discomfort  4. Nausea and vomiting       Will hold mtx until gi issues improve  Cont pred 60 daily  Repeat ct chest today  Gi working up n/v  Calcium back up today, mgmt per nephrology as patient will allow iv pamidronate yesterday    Follow up with Dr Paniagua two weeks.         David Luna MD  [facility]  08/04/20  1209    No pulm contraindication for egd, pt is 100% on room air.    David Luna MD  [facility]  08/04/20  2:03 PM

## 2020-08-04 NOTE — PROGRESS NOTES
Name: Bonifacio Lewis ADMIT: 2020   : 1970  PCP: Vinod Reyna MD    MRN: [id number] LOS: 5 days   AGE/SEX: 50 y.o. male  ROOM: [facility]     Subjective   Subjective   CC: nausea  No acute events. Nausea has improved and he has tolerated PO without vomiting. No f/c/d. +BM    Objective   Objective   Vital Signs  Temp:  [97.1 °F (36.2 °C)-99 °F (37.2 °C)] 97.6 °F (36.4 °C)  Heart Rate:  [65-71] 71  Resp:  [16-18] 18  BP: (131-149)/(77-88) 134/79  SpO2:  [94 %-100 %] 100 %  on   ;   Device (Oxygen Therapy): room air  Body mass index is 20.52 kg/m².  Physical Exam   Constitutional: He is oriented to person, place, and time. No distress.   HENT:   Head: Normocephalic and atraumatic.   Mouth/Throat: Oropharynx is clear and moist.   Eyes: Pupils are equal, round, and reactive to light. Conjunctivae and EOM are normal.   Neck: Normal range of motion. Neck supple. No JVD present.   Cardiovascular: Normal rate, regular rhythm and intact distal pulses.   Pulmonary/Chest: Effort normal and breath sounds normal.   Abdominal: Soft. Bowel sounds are normal.   Musculoskeletal: He exhibits no edema or tenderness.   Neurological: He is alert and oriented to person, place, and time.   Skin: Skin is warm and dry. Capillary refill takes less than 2 seconds. He is not diaphoretic.   Psychiatric: He has a normal mood and affect. His behavior is normal.   Nursing note and vitals reviewed.    Results Review:       I reviewed the patient's new clinical results.  I reviewed the patient's telemetry.  Results from last 7 days   Lab Units 20  0730 20  0551 20  0716 20  0544   WBC 10*3/mm3 13.53* 8.36 7.38 6.28   HEMOGLOBIN g/dL 12.7* 10.0* 12.4* 11.7*   PLATELETS 10*3/mm3 364 280 295 274     Results from last 7 days   Lab Units 20  0723 20  0730 20  1408 20  0551   SODIUM mmol/L 138 132* 141 144   POTASSIUM mmol/L 3.7 2.7* 3.1* 6.1*   CHLORIDE mmol/L 109* 100 108* 118*   CO2 mmol/L  20.1* 21.3* 18.3* 17.5*   BUN mg/dL 13 12 9 8   CREATININE mg/dL 1.36* 1.45* 1.35* 1.12   GLUCOSE mg/dL 87 106* 100* 75   Estimated Creatinine Clearance: 74.8 mL/min (A) (by C-G formula based on SCr of 1.36 mg/dL (H)).  Results from last 7 days   Lab Units 08/04/20  0723 08/03/20  0730 08/02/20  0551 07/30/20  0544 07/30/20  0019   ALBUMIN g/dL 3.50 3.90 3.20* 3.00* 3.80   BILIRUBIN mg/dL  --   --   --  0.5 0.7   ALK PHOS U/L  --   --   --  63 83   AST (SGOT) U/L  --   --   --  12 14   ALT (SGPT) U/L  --   --   --  10 13     Results from last 7 days   Lab Units 08/04/20  0723 08/03/20  0730 08/02/20  1408 08/02/20  0551  07/30/20  0544   CALCIUM mg/dL 12.4* 13.5* 12.8* 10.4   < > 12.0*   ALBUMIN g/dL 3.50 3.90  --  3.20*  --  3.00*   MAGNESIUM mg/dL 1.8 1.5* 1.3* 0.9*  --   --    PHOSPHORUS mg/dL 2.8 2.5 2.5 2.4*  --   --     < > = values in this interval not displayed.       Results from last 7 days   Lab Units 08/01/20  2208   COVID19  Not Detected       No results found for: HGBA1C, POCGLU    CT Chest Without Contrast  Narrative: CT CHEST WITHOUT CONTRAST     HISTORY: 50-year-old male with sarcoidosis and hypercalcemia. Acute  renal injury. Follow up.     TECHNIQUE: Radiation dose reduction techniques were utilized, including  automated exposure control and exposure modulation based on body size.   3 mm images were obtained through the chest without the administration  of IV contrast. Compared with the previous chest CTA from 06/26/2020 and  images of the lower chest on CT of the abdomen from 07/30/2020.     FINDINGS: Other than a fine subpleural nodularity and the pleural  parenchymal thickening at the periphery of the upper lobes and the  chronic appearing scarring at the anterior medial aspect of the right  upper lobe, the upper lobes are clear. At the right middle lobe and both  lower lobes, there is a combination of groundglass opacities and there  are variable sized dense airspace consolidations. The  dense  consolidations are the most confluent and largest at the right lower  lobe, image 66. There is mild bronchiectasis throughout most of the  right middle lobe. There are air bronchograms at the lower lobes, but no  definite bronchiectasis. Since the recent CT of the abdomen, the  airspace opacities at the lower lobes are slightly less dense and  slightly smaller. There are no pleural or pericardial effusions. The  mediastinal and hilar lymphadenopathy has significantly decreased since  the previous chest CTA. A 2.5 x 1.7 cm subcarinal node previously  measured approximately 5.8 x 2.8 cm.     Impression: 1. The pattern and appearance of the airspace opacities is compatible  with alveolar sarcoidosis. The opacities at the lower lobes and right  middle lobe have slightly decreased since the recent CT of the abdomen  and there has been a significant improvement in all lobes since the  previous chest CTA. There has also been significant decrease in the  mediastinal and hilar lymphadenopathy since the previous chest CTA.  2. There is mild bronchiectasis throughout most of the right middle lobe  and there is localized bronchiectasis at the anterior medial aspect of  the right upper lobe where there is chronic scarring.           famotidine 20 mg Intravenous Q12H   predniSONE 60 mg Oral Daily With Breakfast   sodium chloride 10 mL Intravenous Q12H   sucralfate 1 g Oral 4x Daily AC & at Bedtime       sodium chloride 125 mL/hr Last Rate: 125 mL/hr (08/04/20 0902)   Diet Clear Liquid       Assessment/Plan     Active Hospital Problems    Diagnosis  POA   • **Hypercalcemia [E83.52]  Yes   • JACKI (acute kidney injury) (CMS/HCC) [N17.9]  Yes   • Constipation [K59.00]  Yes   • Non-intractable vomiting with nausea [R11.2]  Yes   • Nausea [R11.0]  Unknown   • 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    Hypercalcemia  - due to sarcoidosis and the source of most of his symptoms  - slow to respond but treatment has been delayed due to poor patient compliance  - continue on IVF  - received pamindronate 8/3/20  - appreciate nephrology recs     Constipation/Nausea  - constipation resolved resolved, continue on bowel regimen  - appreciate GI recs, planning EGD when electrolytes are improved but in the mean time continue pepcid and carafate     JACKI  - probably due to volume depletion/hypercalcemia  - improved with IVF initially but serum creatinine is back up-IVF increased     Sarcoidosis  - pulmonary status appears stable  - continue on current dose of prednisone  - holding methotrexate until GI issues improve  - appreciate pulmonology recs, f/u with Dr. Paniagua in 2 weeks     S/P lumbar Laminectomy  - Dr. Bustillo has evaluated, appreciate recs-continue brace and f/u with him in 2 months    SCDs for DVT prophylaxis.  Full code.  Discussed with patient and nursing staff.  Anticipate discharge home in 1-2 days.      Steffen Meadows MD  [facility]  08/04/20  18:17    I wore protective equipment throughout this patient encounter including a face mask, gloves and protective eyewear.  Hand hygiene was performed before donning protective equipment and after removal when leaving the room.

## 2020-08-05 LAB
ALBUMIN SERPL-MCNC: 3.4 G/DL (ref 3.5–5.2)
ALBUMIN/GLOB SERPL: 1.3 G/DL
ALP SERPL-CCNC: 63 U/L (ref 39–117)
ALT SERPL W P-5'-P-CCNC: 18 U/L (ref 1–41)
ANION GAP SERPL CALCULATED.3IONS-SCNC: 8.6 MMOL/L (ref 5–15)
AST SERPL-CCNC: 12 U/L (ref 1–40)
BILIRUB SERPL-MCNC: 0.7 MG/DL (ref 0–1.2)
BUN SERPL-MCNC: 13 MG/DL (ref 6–20)
BUN/CREAT SERPL: 10.2 (ref 7–25)
CALCIUM SPEC-SCNC: 11.1 MG/DL (ref 8.6–10.5)
CHLORIDE SERPL-SCNC: 106 MMOL/L (ref 98–107)
CO2 SERPL-SCNC: 22.4 MMOL/L (ref 22–29)
CREAT SERPL-MCNC: 1.27 MG/DL (ref 0.76–1.27)
GFR SERPL CREATININE-BSD FRML MDRD: 73 ML/MIN/1.73
GLOBULIN UR ELPH-MCNC: 2.7 GM/DL
GLUCOSE SERPL-MCNC: 82 MG/DL (ref 65–99)
MAGNESIUM SERPL-MCNC: 1.5 MG/DL (ref 1.6–2.6)
POTASSIUM SERPL-SCNC: 2.9 MMOL/L (ref 3.5–5.2)
PROT SERPL-MCNC: 6.1 G/DL (ref 6–8.5)
SODIUM SERPL-SCNC: 137 MMOL/L (ref 136–145)

## 2020-08-05 PROCEDURE — 63710000001 PREDNISONE PER 5 MG: Performed by: INTERNAL MEDICINE

## 2020-08-05 PROCEDURE — 83735 ASSAY OF MAGNESIUM: CPT | Performed by: INTERNAL MEDICINE

## 2020-08-05 PROCEDURE — 80053 COMPREHEN METABOLIC PANEL: CPT | Performed by: INTERNAL MEDICINE

## 2020-08-05 PROCEDURE — 25010000002 MAGNESIUM SULFATE IN D5W 1G/100ML (PREMIX) 1-5 GM/100ML-% SOLUTION: Performed by: INTERNAL MEDICINE

## 2020-08-05 PROCEDURE — 99232 SBSQ HOSP IP/OBS MODERATE 35: CPT | Performed by: INTERNAL MEDICINE

## 2020-08-05 RX ORDER — POTASSIUM CHLORIDE 750 MG/1
40 CAPSULE, EXTENDED RELEASE ORAL
Status: COMPLETED | OUTPATIENT
Start: 2020-08-05 | End: 2020-08-05

## 2020-08-05 RX ORDER — FAMOTIDINE 20 MG/1
20 TABLET, FILM COATED ORAL
Status: DISCONTINUED | OUTPATIENT
Start: 2020-08-05 | End: 2020-08-06 | Stop reason: HOSPADM

## 2020-08-05 RX ORDER — MAGNESIUM SULFATE 1 G/100ML
1 INJECTION INTRAVENOUS
Status: DISPENSED | OUTPATIENT
Start: 2020-08-05 | End: 2020-08-05

## 2020-08-05 RX ADMIN — SUCRALFATE 1 G: 1 TABLET ORAL at 17:16

## 2020-08-05 RX ADMIN — SUCRALFATE 1 G: 1 TABLET ORAL at 06:51

## 2020-08-05 RX ADMIN — SUCRALFATE 1 G: 1 TABLET ORAL at 11:51

## 2020-08-05 RX ADMIN — POTASSIUM CHLORIDE 40 MEQ: 10 CAPSULE, COATED, EXTENDED RELEASE ORAL at 11:50

## 2020-08-05 RX ADMIN — SODIUM CHLORIDE, PRESERVATIVE FREE 10 ML: 5 INJECTION INTRAVENOUS at 20:21

## 2020-08-05 RX ADMIN — POTASSIUM CHLORIDE 40 MEQ: 10 CAPSULE, COATED, EXTENDED RELEASE ORAL at 14:13

## 2020-08-05 RX ADMIN — MAGNESIUM SULFATE HEPTAHYDRATE 1 G: 1 INJECTION, SOLUTION INTRAVENOUS at 11:51

## 2020-08-05 RX ADMIN — SUCRALFATE 1 G: 1 TABLET ORAL at 20:21

## 2020-08-05 RX ADMIN — PREDNISONE 60 MG: 50 TABLET ORAL at 08:56

## 2020-08-05 RX ADMIN — SODIUM CHLORIDE, PRESERVATIVE FREE 10 ML: 5 INJECTION INTRAVENOUS at 08:56

## 2020-08-05 RX ADMIN — MAGNESIUM SULFATE HEPTAHYDRATE 1 G: 1 INJECTION, SOLUTION INTRAVENOUS at 13:22

## 2020-08-05 RX ADMIN — FAMOTIDINE 20 MG: 10 INJECTION INTRAVENOUS at 08:56

## 2020-08-05 RX ADMIN — SODIUM CHLORIDE 125 ML/HR: 9 INJECTION, SOLUTION INTRAVENOUS at 06:51

## 2020-08-05 RX ADMIN — POTASSIUM CHLORIDE 40 MEQ: 10 CAPSULE, COATED, EXTENDED RELEASE ORAL at 16:04

## 2020-08-05 RX ADMIN — FAMOTIDINE 20 MG: 20 TABLET, FILM COATED ORAL at 17:18

## 2020-08-05 NOTE — PLAN OF CARE
Pt. VS WNL.  No c/o pain.  Pt. A&O x4.  Pt. C/o frequent BMs now.  Pt. Declined EGD now.  Pt. Diet advanced to regular, tolerating well.  Pt. K+ and mag replaced today, will recheck with a.m. Labs.  Pt. Receiving IVFs.  Pt. Up ad lul, with adequate UOP.  Pt. Calcium improving.  Pt. Resting comfortably at present, will continue to monitor closely.      Problem: Patient Care Overview  Goal: Plan of Care Review  Outcome: Ongoing (interventions implemented as appropriate)  Flowsheets (Taken 8/5/2020 1575)  Progress: improving  Plan of Care Reviewed With: patient

## 2020-08-05 NOTE — PROGRESS NOTES
Name: Bonifacio Lewis ADMIT: 2020   : 1970  PCP: Vinod Reyna MD    MRN: [id number] LOS: 6 days   AGE/SEX: 50 y.o. male  ROOM: [facility]     Subjective   Subjective   CC: nausea  No acute events. Nausea is even more improved and he overall is feeling better. Taking PO well. No f/c/d. +BM    Objective   Objective   Vital Signs  Temp:  [97.8 °F (36.6 °C)-98.5 °F (36.9 °C)] 98.3 °F (36.8 °C)  Heart Rate:  [] 100  Resp:  [16-18] 16  BP: (121-140)/(72-85) 121/72  SpO2:  [99 %] 99 %  on   ;   Device (Oxygen Therapy): room air  Body mass index is 20.52 kg/m².  Physical Exam   Constitutional: He is oriented to person, place, and time. No distress.   HENT:   Head: Normocephalic and atraumatic.   Mouth/Throat: Oropharynx is clear and moist.   Eyes: Pupils are equal, round, and reactive to light. Conjunctivae and EOM are normal.   Neck: Normal range of motion. Neck supple. No JVD present.   Cardiovascular: Normal rate, regular rhythm and intact distal pulses.   Pulmonary/Chest: Effort normal and breath sounds normal.   Abdominal: Soft. Bowel sounds are normal.   Musculoskeletal: He exhibits no edema or tenderness.   Neurological: He is alert and oriented to person, place, and time.   Skin: Skin is warm and dry. Capillary refill takes less than 2 seconds. He is not diaphoretic.   Psychiatric: He has a normal mood and affect. His behavior is normal.   Nursing note and vitals reviewed.    Results Review:       I reviewed the patient's new clinical results.  I reviewed the patient's telemetry.  Results from last 7 days   Lab Units 20  0730 20  0551 20  0716 20  0544   WBC 10*3/mm3 13.53* 8.36 7.38 6.28   HEMOGLOBIN g/dL 12.7* 10.0* 12.4* 11.7*   PLATELETS 10*3/mm3 364 280 295 274     Results from last 7 days   Lab Units 20  0753 20  0723 20  0730 20  1408   SODIUM mmol/L 137 138 132* 141   POTASSIUM mmol/L 2.9* 3.7 2.7* 3.1*   CHLORIDE mmol/L 106 109* 100 108*     CO2 mmol/L 22.4 20.1* 21.3* 18.3*   BUN mg/dL 13 13 12 9   CREATININE mg/dL 1.27 1.36* 1.45* 1.35*   GLUCOSE mg/dL 82 87 106* 100*   Estimated Creatinine Clearance: 80.1 mL/min (by C-G formula based on SCr of 1.27 mg/dL).  Results from last 7 days   Lab Units 08/05/20  0753 08/04/20  0723 08/03/20  0730 08/02/20  0551 07/30/20  0544 07/30/20  0019   ALBUMIN g/dL 3.40* 3.50 3.90 3.20* 3.00* 3.80   BILIRUBIN mg/dL 0.7  --   --   --  0.5 0.7   ALK PHOS U/L 63  --   --   --  63 83   AST (SGOT) U/L 12  --   --   --  12 14   ALT (SGPT) U/L 18  --   --   --  10 13     Results from last 7 days   Lab Units 08/05/20  0753 08/04/20  0723 08/03/20  0730 08/02/20  1408 08/02/20  0551   CALCIUM mg/dL 11.1* 12.4* 13.5* 12.8* 10.4   ALBUMIN g/dL 3.40* 3.50 3.90  --  3.20*   MAGNESIUM mg/dL 1.5* 1.8 1.5* 1.3* 0.9*   PHOSPHORUS mg/dL  --  2.8 2.5 2.5 2.4*       Results from last 7 days   Lab Units 08/01/20  2208   COVID19  Not Detected       No results found for: HGBA1C, POCGLU    CT Chest Without Contrast  Narrative: CT CHEST WITHOUT CONTRAST     HISTORY: 50-year-old male with sarcoidosis and hypercalcemia. Acute  renal injury. Follow up.     TECHNIQUE: Radiation dose reduction techniques were utilized, including  automated exposure control and exposure modulation based on body size.   3 mm images were obtained through the chest without the administration  of IV contrast. Compared with the previous chest CTA from 06/26/2020 and  images of the lower chest on CT of the abdomen from 07/30/2020.     FINDINGS: Other than a fine subpleural nodularity and the pleural  parenchymal thickening at the periphery of the upper lobes and the  chronic appearing scarring at the anterior medial aspect of the right  upper lobe, the upper lobes are clear. At the right middle lobe and both  lower lobes, there is a combination of groundglass opacities and there  are variable sized dense airspace consolidations. The dense  consolidations are the most  confluent and largest at the right lower  lobe, image 66. There is mild bronchiectasis throughout most of the  right middle lobe. There are air bronchograms at the lower lobes, but no  definite bronchiectasis. Since the recent CT of the abdomen, the  airspace opacities at the lower lobes are slightly less dense and  slightly smaller. There are no pleural or pericardial effusions. The  mediastinal and hilar lymphadenopathy has significantly decreased since  the previous chest CTA. A 2.5 x 1.7 cm subcarinal node previously  measured approximately 5.8 x 2.8 cm.     Impression: 1. The pattern and appearance of the airspace opacities is compatible  with alveolar sarcoidosis. The opacities at the lower lobes and right  middle lobe have slightly decreased since the recent CT of the abdomen  and there has been a significant improvement in all lobes since the  previous chest CTA. There has also been significant decrease in the  mediastinal and hilar lymphadenopathy since the previous chest CTA.  2. There is mild bronchiectasis throughout most of the right middle lobe  and there is localized bronchiectasis at the anterior medial aspect of  the right upper lobe where there is chronic scarring.     This report was finalized on 8/4/2020 7:29 PM by Dr. Kandice Morrell M.D.           famotidine 20 mg Oral BID AC   potassium chloride 40 mEq Oral Q2H   predniSONE 60 mg Oral Daily With Breakfast   sodium chloride 10 mL Intravenous Q12H   sucralfate 1 g Oral 4x Daily AC & at Bedtime       sodium chloride 125 mL/hr Last Rate: 125 mL/hr (08/05/20 0651)   Diet Regular       Assessment/Plan     Active Hospital Problems    Diagnosis  POA   • **Hypercalcemia [E83.52]  Yes   • JACKI (acute kidney injury) (CMS/HCC) [N17.9]  Yes   • Constipation [K59.00]  Yes   • Non-intractable vomiting with nausea [R11.2]  Yes   • Nausea [R11.0]  Unknown   • Sarcoidosis of lung (CMS/HCC) [D86.0]  Yes   • S/P lumbar laminectomy [Z98.890]  Not Applicable         Resolved Hospital Problems   No resolved problems to display.   Hypercalcemia  - due to sarcoidosis and the source of most of his symptoms  - received pamindronate 8/3/20  - continue on IVF  - appreciate nephrology recs     Constipation/Nausea  - constipation resolved resolved, continue on bowel regimen  - pepcid and carafate started per GI  - original plan was for EGD but the patient is feeling better and does not wish to proceed  - appreciate GI recs-they have signed off     JACKI  - probably due to volume depletion/hypercalcemia  - improved with IVF-continue and replace K+ and Mg++  - appreciate nephrology recs     Sarcoidosis  - pulmonary status appears stable  - continue on current dose of prednisone  - repeat chest CT from 8/4/20 showing significant improvement  - holding methotrexate until GI issues improve  - appreciate pulmonology recs, f/u with Dr. Paniagua in 2 weeks     S/P lumbar Laminectomy  - Dr. Bustillo has evaluated, appreciate recs-continue brace and f/u with him in 2 months    SCDs for DVT prophylaxis.  Full code.  Discussed with patient and nursing staff.  Anticipate discharge home in 1-2 days.      Steffen Meadows MD  [facility]  08/05/20  15:26    I wore protective equipment throughout this patient encounter including a face mask, gloves and protective eyewear.  Hand hygiene was performed before donning protective equipment and after removal when leaving the room.

## 2020-08-05 NOTE — PROGRESS NOTES
"  Daily Progress Note.   [address]  8/5/2020    Patient:  Name:  Bonifacio Lewis  MRN:  [id number]  1970  50 y.o.  male         CC: hypercalcemia    Interval History:  Follow up sarcoidosis, hypercalcemia  Patient appears much better today.  He sitting upright in bed does not appear to be in pain says his breathing is fine.  When questioned how his breathing compares to 3 to 4 months ago he says much much better.  Reviewed results of CT scan with him.  He is very encouraged.  Says his appetite is better and in fact he is actually searching the Internet at present time to order online food. abd pain better no hemoptysis no productive cough no worsening soa.    ROS: No fever, no diarrhea  ea, no chest pain  PMFSSH: no change    Physical Exam:  /78   Pulse 69   Temp 98.3 °F (36.8 °C) (Oral)   Resp 16   Ht 198.1 cm (77.99\")   Wt 80.5 kg (177 lb 8 oz)   SpO2 99%   BMI 20.52 kg/m²   Body mass index is 20.52 kg/m².    Intake/Output Summary (Last 24 hours) at 8/5/2020 0816  Last data filed at 8/4/2020 1300  Gross per 24 hour   Intake 600 ml   Output 425 ml   Net 175 ml     General appearance: well appearing  conversant   Eyes: anicteric sclerae, moist conjunctivae; no lid-lag; PERRLA  HENT: Atraumatic; oropharynx limited by facemask required during global pandemic of covid by [facility] policy  Neck: Trachea midline; FROM, supple, will not allow palpatoin  Lungs: CTA, with normal respiratory effort and no intercostal retractions  CV: RRR, no MRGs   Abdomen: Soft, non-tender; no masses or HSM  Extremities: No peripheral edema or extremity lymphadenopathy  Skin: Normal temperature, turgor and texture; no rash, ulcers or subcutaneous nodules  Psych: calm affect, alert and oriented to person place    Data Review:  Notable Labs:  Results from last 7 days   Lab Units 08/03/20  0730 08/02/20  0551 07/31/20  0716 07/30/20  0544 07/30/20  0019   WBC 10*3/mm3 13.53* 8.36 7.38 6.28 8.51   HEMOGLOBIN " g/dL 12.7* 10.0* 12.4* 11.7* 14.3   PLATELETS 10*3/mm3 364 280 295 274 326     Results from last 7 days   Lab Units 08/04/20  0723 08/03/20  0730 08/02/20  1408 08/02/20  0551 07/31/20  1037 07/30/20  0544 07/30/20  0019   SODIUM mmol/L 138 132* 141 144 133* 133* 133*   POTASSIUM mmol/L 3.7 2.7* 3.1* 6.1* 3.2* 3.0* 3.7   CHLORIDE mmol/L 109* 100 108* 118* 96* 102 96*   CO2 mmol/L 20.1* 21.3* 18.3* 17.5* 25.4 24.0 26.8   BUN mg/dL 13 12 9 8 12 16 19   CREATININE mg/dL 1.36* 1.45* 1.35* 1.12 1.79* 1.52* 1.80*   GLUCOSE mg/dL 87 106* 100* 75 92 95 107*   CALCIUM mg/dL 12.4* 13.5* 12.8* 10.4 14.2* 12.0* 14.4*   MAGNESIUM mg/dL 1.8 1.5* 1.3* 0.9*  --   --   --    PHOSPHORUS mg/dL 2.8 2.5 2.5 2.4*  --   --   --    Estimated Creatinine Clearance: 74.8 mL/min (A) (by C-G formula based on SCr of 1.36 mg/dL (H)).    Results from last 7 days   Lab Units 08/03/20  0730 08/02/20  0551 07/31/20  0716 07/30/20  0544 07/30/20  0019   AST (SGOT) U/L  --   --   --  12 14   ALT (SGPT) U/L  --   --   --  10 13   PLATELETS 10*3/mm3 364 280 295 274 326             Imaging:  Reviewed chest images personally from past 3 days    Scheduled meds:      famotidine 20 mg Intravenous Q12H   predniSONE 60 mg Oral Daily With Breakfast   sodium chloride 10 mL Intravenous Q12H   sucralfate 1 g Oral 4x Daily AC & at Bedtime       ASSESSMENT  /  PLAN:  1. Sarcoidosis with significant lung involvement and with hypercalcemia  2. Acute kidney injury  3. Constipation with abdominal distention and discomfort  4. Nausea and vomiting       Will hold mtx until gi issues improve  Cont pred 60 daily  Repeat ct chest shows significant improvement in pulmonary sarcoidosis  Gi working up n/v  Calcium pending this am  Consideration of starting arava as outpatient, wonder if GI symptoms hypercalcemia related or mtx, but will continue pred, seems to have been working great radiographically from pulmonary perspective.    Follow up with Dr Paniagua two  weeks.         David Luna MD  [facility]  08/05/20  1030r

## 2020-08-05 NOTE — PROGRESS NOTES
"   LOS: 6 days    Patient Care Team:  Vinod Reyna MD as PCP - General (Internal Medicine)    Chief Complaint:    Chief Complaint   Patient presents with   • Constipation   • Nausea     Follow-up acute kidney injury on chronic kidney disease and severe hypercalcemia  Subjective     Interval History:   The patient is feeling much better he is upset as he has not received solid food yet.  He denies any chest pain or shortness of air, no orthopnea or PND, no nausea or vomiting, no abdominal pain, no dysuria or gross hematuria, no lower extremity edema.    Objective     Vital Signs  Temp:  [97.6 °F (36.4 °C)-98.5 °F (36.9 °C)] 98.3 °F (36.8 °C)  Heart Rate:  [69-73] 69  Resp:  [16-18] 16  BP: (124-140)/(78-85) 124/78    Flowsheet Rows      First Filed Value   Admission Height  198.1 cm (78\") Documented at 07/29/2020 2005   Admission Weight  82.7 kg (182 lb 6.4 oz) Documented at 07/29/2020 2237          No intake/output data recorded.  I/O last 3 completed shifts:  In: 600 [P.O.:600]  Out: 1675 [Urine:1675]    Intake/Output Summary (Last 24 hours) at 8/5/2020 0945  Last data filed at 8/4/2020 1300  Gross per 24 hour   Intake 360 ml   Output 225 ml   Net 135 ml       Physical Exam:  General Appearance: alert, oriented x 3, no acute distress,   Skin: warm and dry  HEENT: pupils round and reactive to light, oral mucosa  Neck: supple, no JVD, trachea midline  Lungs: CTA, unlabored breathing effort  Heart: RRR, normal S1 and S2, no S3, no rub  Abdomen: soft, nontender,  present bowel sounds to auscultation  : no palpable bladder,  Extremities: no edema, cyanosis or clubbing  Neuro: normal speech and mental status         Results Review:    Results from last 7 days   Lab Units 08/05/20  0753 08/04/20  0723 08/03/20  0730  07/30/20  0544 07/30/20  0019   SODIUM mmol/L 137 138 132*   < > 133* 133*   POTASSIUM mmol/L 2.9* 3.7 2.7*   < > 3.0* 3.7   CHLORIDE mmol/L 106 109* 100   < > 102 96*   CO2 mmol/L 22.4 20.1* 21.3*   < > " 24.0 26.8   BUN mg/dL 13 13 12   < > 16 19   CREATININE mg/dL 1.27 1.36* 1.45*   < > 1.52* 1.80*   CALCIUM mg/dL 11.1* 12.4* 13.5*   < > 12.0* 14.4*   BILIRUBIN mg/dL 0.7  --   --   --  0.5 0.7   ALK PHOS U/L 63  --   --   --  63 83   ALT (SGPT) U/L 18  --   --   --  10 13   AST (SGOT) U/L 12  --   --   --  12 14   GLUCOSE mg/dL 82 87 106*   < > 95 107*    < > = values in this interval not displayed.       Estimated Creatinine Clearance: 80.1 mL/min (by C-G formula based on SCr of 1.27 mg/dL).    Results from last 7 days   Lab Units 08/05/20  0753 08/04/20  0723 08/03/20  0730 08/02/20  1408   MAGNESIUM mg/dL 1.5* 1.8 1.5* 1.3*   PHOSPHORUS mg/dL  --  2.8 2.5 2.5       Results from last 7 days   Lab Units 08/04/20  0723   URIC ACID mg/dL 10.3*       Results from last 7 days   Lab Units 08/03/20  0730 08/02/20  0551 07/31/20  0716 07/30/20  0544 07/30/20  0019   WBC 10*3/mm3 13.53* 8.36 7.38 6.28 8.51   HEMOGLOBIN g/dL 12.7* 10.0* 12.4* 11.7* 14.3   PLATELETS 10*3/mm3 364 280 295 578 326               Imaging Results (Last 24 Hours)     Procedure Component Value Units Date/Time    CT Chest Without Contrast [[id number]] Collected:  08/04/20 1744     Updated:  08/04/20 1932    Narrative:       CT CHEST WITHOUT CONTRAST     HISTORY: 50-year-old male with sarcoidosis and hypercalcemia. Acute  renal injury. Follow up.     TECHNIQUE: Radiation dose reduction techniques were utilized, including  automated exposure control and exposure modulation based on body size.   3 mm images were obtained through the chest without the administration  of IV contrast. Compared with the previous chest CTA from 06/26/2020 and  images of the lower chest on CT of the abdomen from 07/30/2020.     FINDINGS: Other than a fine subpleural nodularity and the pleural  parenchymal thickening at the periphery of the upper lobes and the  chronic appearing scarring at the anterior medial aspect of the right  upper lobe, the upper lobes are clear. At the  right middle lobe and both  lower lobes, there is a combination of groundglass opacities and there  are variable sized dense airspace consolidations. The dense  consolidations are the most confluent and largest at the right lower  lobe, image 66. There is mild bronchiectasis throughout most of the  right middle lobe. There are air bronchograms at the lower lobes, but no  definite bronchiectasis. Since the recent CT of the abdomen, the  airspace opacities at the lower lobes are slightly less dense and  slightly smaller. There are no pleural or pericardial effusions. The  mediastinal and hilar lymphadenopathy has significantly decreased since  the previous chest CTA. A 2.5 x 1.7 cm subcarinal node previously  measured approximately 5.8 x 2.8 cm.       Impression:       1. The pattern and appearance of the airspace opacities is compatible  with alveolar sarcoidosis. The opacities at the lower lobes and right  middle lobe have slightly decreased since the recent CT of the abdomen  and there has been a significant improvement in all lobes since the  previous chest CTA. There has also been significant decrease in the  mediastinal and hilar lymphadenopathy since the previous chest CTA.  2. There is mild bronchiectasis throughout most of the right middle lobe  and there is localized bronchiectasis at the anterior medial aspect of  the right upper lobe where there is chronic scarring.     This report was finalized on 8/4/2020 7:29 PM by Dr. Kandice Morrell M.D.             famotidine 20 mg Intravenous Q12H   predniSONE 60 mg Oral Daily With Breakfast   sodium chloride 10 mL Intravenous Q12H   sucralfate 1 g Oral 4x Daily AC & at Bedtime       sodium chloride 125 mL/hr Last Rate: 125 mL/hr (08/05/20 0651)       Medication Review:   Current Facility-Administered Medications   Medication Dose Route Frequency Provider Last Rate Last Dose   • acetaminophen (TYLENOL) tablet 650 mg  650 mg Oral Q4H PRN Sophia Pennington, APRN         Or   • acetaminophen (TYLENOL) 160 MG/5ML solution 650 mg  650 mg Oral Q4H PRN Sophia Pennington APRN        Or   • acetaminophen (TYLENOL) suppository 650 mg  650 mg Rectal Q4H PRN Sophia Pennington APRN       • bisacodyl (DULCOLAX) suppository 10 mg  10 mg Rectal Daily PRN Sophia Pennington APRN   10 mg at 07/30/20 0423   • docusate sodium (COLACE) capsule 100 mg  100 mg Oral BID PRN Kathie Vasques MD       • famotidine (PEPCID) injection 20 mg  20 mg Intravenous Q12H Mart Morgan MD   20 mg at 08/05/20 0856   • nitroglycerin (NITROSTAT) SL tablet 0.4 mg  0.4 mg Sublingual Q5 Min PRN Sophia Pennington APRN       • ondansetron (ZOFRAN) injection 4 mg  4 mg Intravenous Q6H PRN Levi Warren MD   4 mg at 08/03/20 1153   • polyethylene glycol 3350 powder (packet)  17 g Oral Daily PRN Kathie Vasques MD       • potassium chloride 10 mEq in 100 mL IVPB  10 mEq Intravenous Q1H PRN Levi Warren  mL/hr at 08/03/20 1553 10 mEq at 08/03/20 1553   • predniSONE (DELTASONE) tablet 60 mg  60 mg Oral Daily With Breakfast Aric Mojica MD   60 mg at 08/05/20 0856   • prochlorperazine (COMPAZINE) injection 5 mg  5 mg Intravenous Q6H PRN Sophia Pennington APRN   5 mg at 08/04/20 0001   • promethazine (PHENERGAN) tablet 25 mg  25 mg Oral Q6H PRN Sophia Pennington APRN   25 mg at 08/02/20 1738   • senna (SENOKOT) tablet 2 tablet  2 tablet Oral Nightly PRN Kathie Vasques MD       • sodium chloride 0.9 % flush 10 mL  10 mL Intravenous PRN Natalio Murillo MD       • sodium chloride 0.9 % flush 10 mL  10 mL Intravenous Q12H Sophia Pennington APRN   10 mL at 08/05/20 0856   • sodium chloride 0.9 % flush 10 mL  10 mL Intravenous PRN Sophia Pennington APRN   10 mL at 08/04/20 0904   • sodium chloride 0.9 % infusion  125 mL/hr Intravenous Continuous [place]  mL/hr at 08/05/20 0651 125 mL/hr at 08/05/20 0651   • sucralfate (CARAFATE)  tablet 1 g  1 g Oral 4x Daily AC & at Bedtime Mart Morgan MD   1 g at 08/05/20 0651       Assessment/Plan    1.  Acute kidney injury on chronic kidney disease stage IV associated with severe hypercalcemia, creatinine creatinine is down to 1.27, his calcium down to 11.1, electrolytes within acceptable range other than potassium 2.9 and magnesium 1.5.  His phosphorus yesterday was 2.8.  2.  Hypercalcemia associated with sarcoidosis calcium initially was 14.4 down to 10.4 and on Monday was up to 13.5, he was given pamidronate and his calcium today down to 11.1.    3.  Hypokalemia potassium today 2.9, associated with hypomagnesemia  4.  Hyponatremia resolved.  5.  Sarcoidosis  6.  Chronic back pain      Plan:  1.  Potassium supplement and magnesium supplement  2.  Continue the same treatment  3.  Surveillance labs        Robert Salvador MD  08/05/20  09:45

## 2020-08-06 ENCOUNTER — READMISSION MANAGEMENT (OUTPATIENT)
Dept: CALL CENTER | Facility: HOSPITAL | Age: 50
End: 2020-08-06

## 2020-08-06 VITALS
SYSTOLIC BLOOD PRESSURE: 122 MMHG | TEMPERATURE: 98.3 F | HEIGHT: 78 IN | RESPIRATION RATE: 18 BRPM | HEART RATE: 84 BPM | DIASTOLIC BLOOD PRESSURE: 72 MMHG | BODY MASS INDEX: 20.54 KG/M2 | WEIGHT: 177.5 LBS | OXYGEN SATURATION: 100 %

## 2020-08-06 PROBLEM — R11.2 NON-INTRACTABLE VOMITING WITH NAUSEA: Status: RESOLVED | Noted: 2020-07-30 | Resolved: 2020-08-06

## 2020-08-06 PROBLEM — E87.6 HYPOKALEMIA: Status: ACTIVE | Noted: 2020-08-06

## 2020-08-06 PROBLEM — K59.00 CONSTIPATION: Status: RESOLVED | Noted: 2020-07-30 | Resolved: 2020-08-06

## 2020-08-06 PROBLEM — E83.39 HYPOPHOSPHATEMIA: Status: ACTIVE | Noted: 2020-08-06

## 2020-08-06 LAB
ALBUMIN SERPL-MCNC: 3.2 G/DL (ref 3.5–5.2)
ANION GAP SERPL CALCULATED.3IONS-SCNC: 10.1 MMOL/L (ref 5–15)
BUN SERPL-MCNC: 13 MG/DL (ref 6–20)
BUN/CREAT SERPL: 11.5 (ref 7–25)
CALCIUM SPEC-SCNC: 9.8 MG/DL (ref 8.6–10.5)
CHLORIDE SERPL-SCNC: 109 MMOL/L (ref 98–107)
CO2 SERPL-SCNC: 19.9 MMOL/L (ref 22–29)
CREAT SERPL-MCNC: 1.13 MG/DL (ref 0.76–1.27)
GFR SERPL CREATININE-BSD FRML MDRD: 84 ML/MIN/1.73
GLUCOSE SERPL-MCNC: 90 MG/DL (ref 65–99)
MAGNESIUM SERPL-MCNC: 1.8 MG/DL (ref 1.6–2.6)
PHOSPHATE SERPL-MCNC: 1.5 MG/DL (ref 2.5–4.5)
POTASSIUM SERPL-SCNC: 3.3 MMOL/L (ref 3.5–5.2)
SODIUM SERPL-SCNC: 139 MMOL/L (ref 136–145)
URATE SERPL-MCNC: 9 MG/DL (ref 3.4–7)

## 2020-08-06 PROCEDURE — 63710000001 PREDNISONE PER 5 MG: Performed by: INTERNAL MEDICINE

## 2020-08-06 PROCEDURE — 83735 ASSAY OF MAGNESIUM: CPT | Performed by: INTERNAL MEDICINE

## 2020-08-06 PROCEDURE — 84550 ASSAY OF BLOOD/URIC ACID: CPT | Performed by: INTERNAL MEDICINE

## 2020-08-06 PROCEDURE — 80069 RENAL FUNCTION PANEL: CPT | Performed by: INTERNAL MEDICINE

## 2020-08-06 RX ORDER — SENNA PLUS 8.6 MG/1
2 TABLET ORAL NIGHTLY PRN
Qty: 30 TABLET | Refills: 0 | Status: SHIPPED | OUTPATIENT
Start: 2020-08-06 | End: 2020-09-29

## 2020-08-06 RX ORDER — SUCRALFATE 1 G/1
1 TABLET ORAL
Qty: 120 TABLET | Refills: 0 | Status: SHIPPED | OUTPATIENT
Start: 2020-08-06 | End: 2020-09-29

## 2020-08-06 RX ORDER — PROMETHAZINE HYDROCHLORIDE 25 MG/1
25 TABLET ORAL EVERY 6 HOURS PRN
Qty: 30 TABLET | Refills: 0 | Status: SHIPPED | OUTPATIENT
Start: 2020-08-06 | End: 2020-09-29

## 2020-08-06 RX ORDER — SULFAMETHOXAZOLE AND TRIMETHOPRIM 400; 80 MG/1; MG/1
1 TABLET ORAL DAILY
Status: DISCONTINUED | OUTPATIENT
Start: 2020-08-06 | End: 2020-08-06 | Stop reason: HOSPADM

## 2020-08-06 RX ORDER — PREDNISONE 20 MG/1
60 TABLET ORAL
Qty: 90 TABLET | Refills: 0 | Status: SHIPPED | OUTPATIENT
Start: 2020-08-07 | End: 2020-09-04 | Stop reason: SDUPTHER

## 2020-08-06 RX ORDER — FAMOTIDINE 20 MG/1
20 TABLET, FILM COATED ORAL
Qty: 60 TABLET | Refills: 0 | Status: SHIPPED | OUTPATIENT
Start: 2020-08-06 | End: 2020-09-29

## 2020-08-06 RX ORDER — SULFAMETHOXAZOLE AND TRIMETHOPRIM 400; 80 MG/1; MG/1
1 TABLET ORAL DAILY
Qty: 30 TABLET | Refills: 0 | Status: SHIPPED | OUTPATIENT
Start: 2020-08-06 | End: 2020-09-05

## 2020-08-06 RX ORDER — POLYETHYLENE GLYCOL 3350 17 G/17G
17 POWDER, FOR SOLUTION ORAL DAILY PRN
Start: 2020-08-06 | End: 2020-09-29

## 2020-08-06 RX ORDER — POTASSIUM CHLORIDE 750 MG/1
40 CAPSULE, EXTENDED RELEASE ORAL ONCE
Status: COMPLETED | OUTPATIENT
Start: 2020-08-06 | End: 2020-08-06

## 2020-08-06 RX ADMIN — SODIUM CHLORIDE, PRESERVATIVE FREE 10 ML: 5 INJECTION INTRAVENOUS at 08:28

## 2020-08-06 RX ADMIN — DIBASIC SODIUM PHOSPHATE, MONOBASIC POTASSIUM PHOSPHATE AND MONOBASIC SODIUM PHOSPHATE 2 TABLET: 852; 155; 130 TABLET ORAL at 12:08

## 2020-08-06 RX ADMIN — POTASSIUM CHLORIDE 40 MEQ: 10 CAPSULE, COATED, EXTENDED RELEASE ORAL at 12:08

## 2020-08-06 RX ADMIN — SUCRALFATE 1 G: 1 TABLET ORAL at 12:08

## 2020-08-06 RX ADMIN — PREDNISONE 60 MG: 50 TABLET ORAL at 08:28

## 2020-08-06 RX ADMIN — SUCRALFATE 1 G: 1 TABLET ORAL at 08:28

## 2020-08-06 RX ADMIN — FAMOTIDINE 20 MG: 20 TABLET, FILM COATED ORAL at 08:28

## 2020-08-06 NOTE — PROGRESS NOTES
"  Daily Progress Note.   [address]  8/6/2020    Patient:  Name:  Bonifacio Lewis  MRN:  [id number]  1970  50 y.o.  male         CC: hypercalcemia    Interval History:  Follow up sarcoidosis, hypercalcemia    No shortness of breath he is awake alert no abdominal pain nauseousness or emesis today.  Overall feels well.    ROS: No fever, no diarrhea  ea, no chest pain  PMFSSH: no change    Physical Exam:  /68 (BP Location: Right arm, Patient Position: Lying)   Pulse 87   Temp 98 °F (36.7 °C) (Oral)   Resp 18   Ht 198.1 cm (77.99\")   Wt 80.5 kg (177 lb 8 oz)   SpO2 100%   BMI 20.52 kg/m²   Body mass index is 20.52 kg/m².    Intake/Output Summary (Last 24 hours) at 8/6/2020 1249  Last data filed at 8/5/2020 1700  Gross per 24 hour   Intake 840 ml   Output --   Net 840 ml     General appearance: well appearing  conversant   Eyes: anicteric sclerae, moist conjunctivae; no lid-lag; PERRLA  HENT: Atraumatic; oropharynx limited by facemask required during global pandemic of covid by [facility] policy  Neck: Trachea midline; FROM, supple, will not allow palpatoin  Lungs: CTA, with normal respiratory effort and no intercostal retractions  CV: RRR, no MRGs   Abdomen: Soft, non-tender; no masses or HSM  Extremities: No peripheral edema or extremity lymphadenopathy  Skin: Normal temperature, turgor and texture; no rash, ulcers or subcutaneous nodules  Psych: calm affect, alert and oriented to person place    Data Review:  Notable Labs:  Results from last 7 days   Lab Units 08/03/20  0730 08/02/20  0551 07/31/20  0716   WBC 10*3/mm3 13.53* 8.36 7.38   HEMOGLOBIN g/dL 12.7* 10.0* 12.4*   PLATELETS 10*3/mm3 364 280 295     Results from last 7 days   Lab Units 08/06/20  0715 08/05/20  0753 08/04/20  0723 08/03/20  0730 08/02/20  1408 08/02/20  0551 07/31/20  1037   SODIUM mmol/L 139 137 138 132* 141 144 133*   POTASSIUM mmol/L 3.3* 2.9* 3.7 2.7* 3.1* 6.1* 3.2*   CHLORIDE mmol/L 109* 106 109* 100 108* " 118* 96*   CO2 mmol/L 19.9* 22.4 20.1* 21.3* 18.3* 17.5* 25.4   BUN mg/dL 13 13 13 12 9 8 12   CREATININE mg/dL 1.13 1.27 1.36* 1.45* 1.35* 1.12 1.79*   GLUCOSE mg/dL 90 82 87 106* 100* 75 92   CALCIUM mg/dL 9.8 11.1* 12.4* 13.5* 12.8* 10.4 14.2*   MAGNESIUM mg/dL 1.8 1.5* 1.8 1.5* 1.3* 0.9*  --    PHOSPHORUS mg/dL 1.5*  --  2.8 2.5 2.5 2.4*  --    Estimated Creatinine Clearance: 90 mL/min (by C-G formula based on SCr of 1.13 mg/dL).    Results from last 7 days   Lab Units 08/05/20  0753 08/03/20  0730 08/02/20  0551 07/31/20  0716   AST (SGOT) U/L 12  --   --   --    ALT (SGPT) U/L 18  --   --   --    PLATELETS 10*3/mm3  --  364 280 295             Imaging:  Reviewed chest images personally from past 3 days      Most recent ct chest on left much improved compared to prior    Scheduled meds:      famotidine 20 mg Oral BID AC   phosphorus 500 mg Oral Q6H   predniSONE 60 mg Oral Daily With Breakfast   sodium chloride 10 mL Intravenous Q12H   sucralfate 1 g Oral 4x Daily AC & at Bedtime       ASSESSMENT  /  PLAN:  1. Sarcoidosis with significant lung involvement and with hypercalcemia  2. Acute kidney injury  3. Constipation with abdominal distention and discomfort  4. Nausea and vomiting       Will hold mtx given gi distress  Cont pred 60 daily  Repeat ct chest shows significant improvement in pulmonary sarcoidosis  Gi working up n/v  Hypercalcemia per nephrology  Bactrim proph start today given long term high dose pred use.  Some debate whether this is needed with sarcoidosis    Consideration of starting arava as outpatient, wonder if GI symptoms hypercalcemia related or mtx, but will continue pred, seems to have been working great radiographically from pulmonary perspective.    Follow up with Dr Paniagua two weeks.         David Luna MD  [facility]  08/06/20  [id number]

## 2020-08-06 NOTE — NURSING NOTE
Notified Dr. Bustillo's office of ortho consult to see if would like to come see pt. Before he leaves or schedule outpatient appointment.  Awaiting response.

## 2020-08-06 NOTE — OUTREACH NOTE
Prep Survey      Responses   [facility] facility patient discharged from?  [place]   Is LACE score < 7 ?  No   Eligibility  [facility]   Date of Admission  07/29/20   Date of Discharge  08/06/20   Discharge Disposition  Home or Self Care   Discharge diagnosis  Hypercalcemia   COVID-19 Test Status  Negative   Does the patient have one of the following disease processes/diagnoses(primary or secondary)?  Other   Does the patient have Home health ordered?  No   Is there a DME ordered?  No   Prep survey completed?  Yes          Carley Bullock RN

## 2020-08-06 NOTE — PROGRESS NOTES
"Adult Nutrition  Assessment/PES    Patient Name:  Bonifacio Lewis  YOB: 1970  MRN: [id number]  Admit Date:  7/29/2020    Assessment Date:  8/6/2020    Comments:  Nutrition follow up.  Nausea resolved.  Calcium improved.  Patient declining EGD.  GI has signed off.  92% x 3 meals per chart PO data.  Possible d/c soon.    Due to the COVID pandemic, nutrition assessment completed based on review of electronic medical record.  This RD currently working remotely and can be reached via secure chat or email.     RD will continue to monitor.     Reason for Assessment     Row Name 08/06/20 1130          Reason for Assessment    Reason For Assessment  follow-up protocol         Nutrition/Diet History     Row Name 08/06/20 1130          Nutrition/Diet History    Typical Food/Fluid Intake  nausea resolved, eating well         Anthropometrics     Row Name 08/06/20 1131          Anthropometrics    Height  198.1 cm (77.99\")        Admit Weight    Admit Weight  -- 177# 8/4        Ideal Body Weight (IBW)    Ideal Body Weight (IBW) (kg)  98.81        Body Mass Index (BMI)    BMI Assessment  BMI 18.5-24.9: normal 20.46         Labs/Tests/Procedures/Meds     Row Name 08/06/20 1131          Labs/Procedures/Meds    Lab Results Reviewed  reviewed, pertinent     Lab Results Comments  K, Alb, Phos        Diagnostic Tests/Procedures    Diagnostic Test/Procedure Reviewed  reviewed, pertinent     Diagnostic Test/Procedures Comments  refusing EGD        Medications    Pertinent Medications Reviewed  reviewed, pertinent     Pertinent Medications Comments  pepcid, KCl, Kphos, carafate, IVFs         Physical Findings     Row Name 08/06/20 1132          Physical Findings    Skin  -- B=21, intact         Estimated/Assessed Needs     Row Name 08/06/20 1131          Calculation Measurements    Height  198.1 cm (77.99\")         Nutrition Prescription Ordered     Row Name 08/06/20 1132          Nutrition Prescription PO    Current PO Diet "  Regular     Supplement  Boost Plus (Ensure Enlive, Ensure Plus)     Supplement Frequency  2 times a day         Evaluation of Received Nutrient/Fluid Intake     Row Name 08/06/20 1132          PO Evaluation    Number of Meals  3     % PO Intake  92         Problem/Interventions:    Intervention Goal     Row Name 08/06/20 1133          Intervention Goal    General  Maintain nutrition;Reduce/improve symptoms;Disease management/therapy     PO  Maintain intake     Weight  Maintain weight         Nutrition Intervention     Row Name 08/06/20 1133          Nutrition Intervention    RD/Tech Action  Follow Tx progress;Care plan reviewd     Recommended/Ordered  Supplement         Education/Evaluation     Row Name 08/06/20 1133          Education    Education  Will Instruct as appropriate        Monitor/Evaluation    Monitor  Per protocol;PO intake;Supplement intake;Pertinent labs;Weight;Symptoms           Electronically signed by:  Trudy Lee RD  08/06/20 11:33

## 2020-08-06 NOTE — PROGRESS NOTES
Case Management Discharge Note      Final Note: Pt discharged home.   ALLEN Castro RN         Destination      No service has been selected for the patient.      Durable Medical Equipment      No service has been selected for the patient.      Dialysis/Infusion      No service has been selected for the patient.      Home Medical Care      No service has been selected for the patient.      Therapy      No service has been selected for the patient.      Community Resources      No service has been selected for the patient.        Transportation Services  Private: Car    Final Discharge Disposition Code: 01 - home or self-care
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
## 2020-08-06 NOTE — PROGRESS NOTES
Name: Bonifacio Lewis ADMIT: 2020   : 1970  PCP: Vinod Reyna MD    MRN: [id number] LOS: 7 days   AGE/SEX: 50 y.o. male  ROOM: [facility]     Subjective   Subjective   C/O chronic back and leg pain. No other complaints    Review of Systems   Constitutional: Negative for fever.   HENT: Negative for congestion.    Respiratory: Negative for shortness of breath.    Cardiovascular: Negative for chest pain.   Gastrointestinal: Negative for nausea.   Genitourinary: Negative for dysuria.   Musculoskeletal: Positive for arthralgias and back pain.   Skin: Negative for rash.   Neurological: Negative for headaches.   Psychiatric/Behavioral: Negative for sleep disturbance.        Objective   Objective   Vital Signs  Temp:  [97.4 °F (36.3 °C)-98.5 °F (36.9 °C)] 98 °F (36.7 °C)  Heart Rate:  [] 87  Resp:  [16-18] 18  BP: (110-126)/(68-83) 110/68  SpO2:  [100 %] 100 %  on   ;   Device (Oxygen Therapy): room air  Body mass index is 20.52 kg/m².  Physical Exam   Constitutional: He is oriented to person, place, and time. He appears well-developed. No distress.   HENT:   Head: Normocephalic.   Eyes: Conjunctivae are normal.   Neck: Neck supple. No JVD present.   Cardiovascular: Normal rate and regular rhythm.   Pulmonary/Chest: Effort normal and breath sounds normal. No respiratory distress.   Abdominal: Soft. Bowel sounds are normal.   Musculoskeletal: He exhibits no edema.   Neurological: He is alert and oriented to person, place, and time.   Skin: Skin is warm and dry.   Psychiatric: He has a normal mood and affect.   Vitals reviewed.      Results Review:       I reviewed the patient's new clinical results.  Results from last 7 days   Lab Units 20  0730 20  0551 20  0716   WBC 10*3/mm3 13.53* 8.36 7.38   HEMOGLOBIN g/dL 12.7* 10.0* 12.4*   PLATELETS 10*3/mm3 364 280 295     Results from last 7 days   Lab Units 20  0715 20  0753 20  0723 20  0730   SODIUM mmol/L 139 137  138 132*   POTASSIUM mmol/L 3.3* 2.9* 3.7 2.7*   CHLORIDE mmol/L 109* 106 109* 100   CO2 mmol/L 19.9* 22.4 20.1* 21.3*   BUN mg/dL 13 13 13 12   CREATININE mg/dL 1.13 1.27 1.36* 1.45*   GLUCOSE mg/dL 90 82 87 106*   Estimated Creatinine Clearance: 90 mL/min (by C-G formula based on SCr of 1.13 mg/dL).  Results from last 7 days   Lab Units 08/06/20  0715 08/05/20  0753 08/04/20  0723 08/03/20  0730   ALBUMIN g/dL 3.20* 3.40* 3.50 3.90   BILIRUBIN mg/dL  --  0.7  --   --    ALK PHOS U/L  --  63  --   --    AST (SGOT) U/L  --  12  --   --    ALT (SGPT) U/L  --  18  --   --      Results from last 7 days   Lab Units 08/06/20  0715 08/05/20  0753 08/04/20  0723 08/03/20  0730 08/02/20  1408   CALCIUM mg/dL 9.8 11.1* 12.4* 13.5* 12.8*   ALBUMIN g/dL 3.20* 3.40* 3.50 3.90  --    MAGNESIUM mg/dL 1.8 1.5* 1.8 1.5* 1.3*   PHOSPHORUS mg/dL 1.5*  --  2.8 2.5 2.5       Results from last 7 days   Lab Units 08/01/20  2208   COVID19  Not Detected       No results found for: HGBA1C, POCGLU    CT Chest Without Contrast  Narrative: CT CHEST WITHOUT CONTRAST     HISTORY: 50-year-old male with sarcoidosis and hypercalcemia. Acute  renal injury. Follow up.     TECHNIQUE: Radiation dose reduction techniques were utilized, including  automated exposure control and exposure modulation based on body size.   3 mm images were obtained through the chest without the administration  of IV contrast. Compared with the previous chest CTA from 06/26/2020 and  images of the lower chest on CT of the abdomen from 07/30/2020.     FINDINGS: Other than a fine subpleural nodularity and the pleural  parenchymal thickening at the periphery of the upper lobes and the  chronic appearing scarring at the anterior medial aspect of the right  upper lobe, the upper lobes are clear. At the right middle lobe and both  lower lobes, there is a combination of groundglass opacities and there  are variable sized dense airspace consolidations. The dense  consolidations are  the most confluent and largest at the right lower  lobe, image 66. There is mild bronchiectasis throughout most of the  right middle lobe. There are air bronchograms at the lower lobes, but no  definite bronchiectasis. Since the recent CT of the abdomen, the  airspace opacities at the lower lobes are slightly less dense and  slightly smaller. There are no pleural or pericardial effusions. The  mediastinal and hilar lymphadenopathy has significantly decreased since  the previous chest CTA. A 2.5 x 1.7 cm subcarinal node previously  measured approximately 5.8 x 2.8 cm.     Impression: 1. The pattern and appearance of the airspace opacities is compatible  with alveolar sarcoidosis. The opacities at the lower lobes and right  middle lobe have slightly decreased since the recent CT of the abdomen  and there has been a significant improvement in all lobes since the  previous chest CTA. There has also been significant decrease in the  mediastinal and hilar lymphadenopathy since the previous chest CTA.  2. There is mild bronchiectasis throughout most of the right middle lobe  and there is localized bronchiectasis at the anterior medial aspect of  the right upper lobe where there is chronic scarring.     This report was finalized on 8/4/2020 7:29 PM by Dr. Kandice Morrell M.D.           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 male admitted with Hypercalcemia.    Hypercalcemia  - due to sarcoidosis and the source of most of his symptoms  - received pamindronate 8/3/20  - continue on IVF  - appreciate nephrology recs  -Ca 9.8 today     Constipation/Nausea  - constipation resolved, continue on bowel regimen  - pepcid and carafate started per GI  - original plan was for EGD but the patient is feeling better and does not wish to proceed  - appreciate GI recs-they have signed off     JACKI/Electrolyte imbalance  - probably due to volume depletion/hypercalcemia  - improved with IVF-continue and replace K+ & Phos per nephrology  - Mg 1.8 s/p repletion     Sarcoidosis  - pulmonary status appears stable  - continue on current dose of prednisone; +leukocytosis. No signs of infection. Monitor  - repeat chest CT from 8/4/20 showing significant improvement  - holding methotrexate until GI issues improve  - appreciate pulmonology recs, f/u with Dr. Paniagua in 2 weeks     S/P lumbar Laminectomy  - Dr. Bustillo has evaluated, appreciate recs-continue brace and f/u with him in 2 months     SCDs for DVT prophylaxis.  Full code.  Discussed with patient and Dr. Maloney.  Anticipate discharge home when cleared by consultants.      STEVE Hernandez  [facility]  08/06/20  10:46    I wore protective equipment throughout this patient encounter including a face mask, gloves and protective eyewear.  Hand hygiene was performed before donning protective equipment and after removal when leaving the room.

## 2020-08-07 ENCOUNTER — TRANSITIONAL CARE MANAGEMENT TELEPHONE ENCOUNTER (OUTPATIENT)
Dept: CALL CENTER | Facility: HOSPITAL | Age: 50
End: 2020-08-07

## 2020-08-07 LAB — PTH RELATED PROT SERPL-SCNC: <2 PMOL/L

## 2020-08-07 NOTE — OUTREACH NOTE
"Call Center TCM Note      Responses   [facility] patient discharged from?  [place]   COVID-19 Test Status  Negative   Does the patient have one of the following disease processes/diagnoses(primary or secondary)?  Other   TCM attempt successful?  Yes   Call start time  1028   Call end time  1034   Discharge diagnosis  Hypercalcemia   Meds reviewed with patient/caregiver?  Yes   Is the patient having any side effects they believe may be caused by any medication additions or changes?  No   Does the patient have all medications ordered at discharge?  Yes   Is the patient taking all medications as directed (includes completed medication regime)?  No   What is preventing the patient from taking all medications as directed?  Other [Pt reports he did not get all of his meds as [facility] is having an issue processing it. He didn't get the phosphorus and one other one but he's unsure which other one it is. He received 5 prescriptions. ]   Nursing Interventions  Nurse provided patient education [routed to case management]   Comments regarding appointments  Pt will schedule appt with Dr. Bustillo   Does the patient have a primary care provider?   Yes   Does the patient have an appointment with their PCP within 7 days of discharge?  No   What is preventing the patient from scheduling follow up appointments within 7 days of discharge?  -- [Pt was in the store at time of call without his calendar. He wanted to wait until he had calendar.]   Nursing Interventions  -- [routed to CP office]   Has the patient kept scheduled appointments due by today?  N/A   Pulse Ox monitoring  None   Psychosocial issues?  No   Notified Case Management  Psychosocial (Urgent) [R/T meds]   Did the patient receive a copy of their discharge instructions?  Yes   Nursing interventions  Reviewed instructions with patient   What is the patient's perception of their health status since discharge?  New symptoms unrelated to diagnosis [Pt repors, \"I'm doing " "fine from what he went into the hosptial for but I need to see someone for my back. I have numbness from my butt to my feet.\"]   Is the patient/caregiver able to teach back signs and symptoms related to disease process for when to call PCP?  Yes   Is the patient/caregiver able to teach back signs and symptoms related to disease process for when to call 911?  Yes   Is the patient/caregiver able to teach back the hierarchy of who to call/visit for symptoms/problems? PCP, Specialist, Home health nurse, Urgent Care, ED, 911  Yes   If the patient is a current smoker, are they able to teach back resources for cessation?  -- [Nonsmoker ]   TCM call completed?  Yes          Patricia Landon RN    8/7/2020, 10:38      "
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
## 2020-08-07 NOTE — OUTREACH NOTE
Case Management Call Center Follow-up      Responses   [facility] Call Center Tracking Reason?  Other (specify in comments)   Other Tracking Comments  FYI   Has the Call Center Case Management Follow-up issue been resolved?  Yes   Follow-up Comments  See previous notes.          Linda Bowens RN    8/7/2020, 17:08

## 2020-08-07 NOTE — PROGRESS NOTES
S/W pt and informed him [facility] pharmacy is waiting on Potassium to come in, it was out of stock. And the senna was not covered by insurance he was informed he could buy that over the counter. Pt verbalized understanding.

## 2020-08-09 DIAGNOSIS — M48.062 SPINAL STENOSIS OF LUMBAR REGION WITH NEUROGENIC CLAUDICATION: ICD-10-CM

## 2020-08-10 RX ORDER — OXYCODONE AND ACETAMINOPHEN 7.5; 325 MG/1; MG/1
TABLET ORAL
Qty: 50 TABLET | Refills: 0 | Status: SHIPPED | OUTPATIENT
Start: 2020-08-10 | End: 2020-08-18 | Stop reason: SDUPTHER

## 2020-08-11 LAB
MYCOBACTERIUM SPEC CULT: NORMAL
NIGHT BLUE STAIN TISS: NORMAL

## 2020-08-14 ENCOUNTER — READMISSION MANAGEMENT (OUTPATIENT)
Dept: CALL CENTER | Facility: HOSPITAL | Age: 50
End: 2020-08-14

## 2020-08-14 NOTE — OUTREACH NOTE
Medical Week 2 Survey      Responses   [facility] patient discharged from?  [place]   COVID-19 Test Status  Negative   Does the patient have one of the following disease processes/diagnoses(primary or secondary)?  Other   Week 2 attempt successful?  Yes   Call start time  1333   Call end time  1340   Meds reviewed with patient/caregiver?  Yes   Is the patient taking all medications as directed (includes completed medication regime)?  Yes   Comments regarding appointments  Pt follows up with pulmonolgy next week.   Has home health visited the patient within 72 hours of discharge?  N/A   Week 2 Call Completed?  Yes          Felicita Jenkins RN

## 2020-08-18 DIAGNOSIS — M48.062 SPINAL STENOSIS OF LUMBAR REGION WITH NEUROGENIC CLAUDICATION: ICD-10-CM

## 2020-08-18 RX ORDER — OXYCODONE AND ACETAMINOPHEN 7.5; 325 MG/1; MG/1
TABLET ORAL
Qty: 50 TABLET | Refills: 0 | Status: SHIPPED | OUTPATIENT
Start: 2020-08-18 | End: 2020-08-23 | Stop reason: SDUPTHER

## 2020-08-21 ENCOUNTER — READMISSION MANAGEMENT (OUTPATIENT)
Dept: CALL CENTER | Facility: HOSPITAL | Age: 50
End: 2020-08-21

## 2020-08-21 NOTE — OUTREACH NOTE
Medical Week 3 Survey      Responses   [facility] patient discharged from?  [place]   Does the patient have one of the following disease processes/diagnoses(primary or secondary)?  Other   Week 3 attempt successful?  No   Unsuccessful attempts  Attempt 1          Antonia Louis RN

## 2020-08-23 DIAGNOSIS — M48.062 SPINAL STENOSIS OF LUMBAR REGION WITH NEUROGENIC CLAUDICATION: ICD-10-CM

## 2020-08-24 ENCOUNTER — READMISSION MANAGEMENT (OUTPATIENT)
Dept: CALL CENTER | Facility: HOSPITAL | Age: 50
End: 2020-08-24

## 2020-08-24 RX ORDER — OXYCODONE AND ACETAMINOPHEN 7.5; 325 MG/1; MG/1
TABLET ORAL
Qty: 50 TABLET | Refills: 0 | Status: SHIPPED | OUTPATIENT
Start: 2020-08-24 | End: 2020-09-04

## 2020-08-24 NOTE — OUTREACH NOTE
Medical Week 3 Survey      Responses   [facility]   COVID-19 Test Status  Negative   Does the patient have one of the following disease processes/diagnoses(primary or secondary)?  Other   Week 3 attempt successful?  Yes   Call start time  1105   Call end time  1108   Medication alerts for this patient  Pt's steroids have been decreased.    Meds reviewed with patient/caregiver?  Yes   Is the patient having any side effects they believe may be caused by any medication additions or changes?  No   Does the patient have all medications ordered at discharge?  Yes   Is the patient taking all medications as directed (includes completed medication regime)?  Yes   Comments regarding appointments  Pt has seen pcp and surgeon.   Has the patient kept scheduled appointments due by today?  Yes   Week 3 Call Completed?  Yes   Graduated  Yes   Did the patient feel the follow up calls were helpful during their recovery period?  Yes   Was the number of calls appropriate?  Yes   Graduated/Revoked comments  Pt reports doing very well. His pain has decreased dramatically. He will follow up with repeat blood work in September.          Felicita Jenkins RN

## 2020-08-31 ENCOUNTER — TELEPHONE (OUTPATIENT)
Dept: ORTHOPEDIC SURGERY | Facility: CLINIC | Age: 50
End: 2020-08-31

## 2020-08-31 DIAGNOSIS — M48.062 SPINAL STENOSIS OF LUMBAR REGION WITH NEUROGENIC CLAUDICATION: ICD-10-CM

## 2020-08-31 RX ORDER — OXYCODONE AND ACETAMINOPHEN 7.5; 325 MG/1; MG/1
TABLET ORAL
Qty: 50 TABLET | Refills: 0 | Status: CANCELLED | OUTPATIENT
Start: 2020-08-31

## 2020-08-31 NOTE — TELEPHONE ENCOUNTER
Per WILMAN 8/24/2020 was his last refill. Called pt to inform him of this. VM not set up will try again later.

## 2020-09-01 ENCOUNTER — TELEPHONE (OUTPATIENT)
Dept: INTERNAL MEDICINE | Age: 50
End: 2020-09-01

## 2020-09-01 DIAGNOSIS — M54.50 LOW BACK PAIN WITHOUT SCIATICA, UNSPECIFIED BACK PAIN LATERALITY, UNSPECIFIED CHRONICITY: Primary | ICD-10-CM

## 2020-09-01 NOTE — TELEPHONE ENCOUNTER
Since he has had low back surgery in the past I am assuming he is having low back pain but I have not seen him that I recall for this issue

## 2020-09-04 ENCOUNTER — TRANSCRIBE ORDERS (OUTPATIENT)
Dept: ADMINISTRATIVE | Facility: HOSPITAL | Age: 50
End: 2020-09-04

## 2020-09-04 ENCOUNTER — OFFICE VISIT (OUTPATIENT)
Dept: INTERNAL MEDICINE | Age: 50
End: 2020-09-04

## 2020-09-04 ENCOUNTER — LAB (OUTPATIENT)
Dept: LAB | Facility: HOSPITAL | Age: 50
End: 2020-09-04

## 2020-09-04 ENCOUNTER — TELEPHONE (OUTPATIENT)
Dept: INTERNAL MEDICINE | Age: 50
End: 2020-09-04

## 2020-09-04 VITALS
DIASTOLIC BLOOD PRESSURE: 80 MMHG | WEIGHT: 206 LBS | TEMPERATURE: 96.7 F | BODY MASS INDEX: 23.83 KG/M2 | HEIGHT: 78 IN | OXYGEN SATURATION: 100 % | RESPIRATION RATE: 13 BRPM | HEART RATE: 84 BPM | SYSTOLIC BLOOD PRESSURE: 148 MMHG

## 2020-09-04 DIAGNOSIS — D86.9 SARCOIDOSIS: ICD-10-CM

## 2020-09-04 DIAGNOSIS — G89.29 CHRONIC BILATERAL LOW BACK PAIN WITHOUT SCIATICA: ICD-10-CM

## 2020-09-04 DIAGNOSIS — E83.52 HYPERCALCEMIA: ICD-10-CM

## 2020-09-04 DIAGNOSIS — G89.29 CHRONIC BILATERAL LOW BACK PAIN WITHOUT SCIATICA: Primary | ICD-10-CM

## 2020-09-04 DIAGNOSIS — M54.50 CHRONIC BILATERAL LOW BACK PAIN WITHOUT SCIATICA: ICD-10-CM

## 2020-09-04 DIAGNOSIS — Z98.890 S/P LUMBAR LAMINECTOMY: Chronic | ICD-10-CM

## 2020-09-04 DIAGNOSIS — M54.50 CHRONIC BILATERAL LOW BACK PAIN WITHOUT SCIATICA: Primary | ICD-10-CM

## 2020-09-04 DIAGNOSIS — D86.9 SARCOIDOSIS: Primary | ICD-10-CM

## 2020-09-04 PROBLEM — M54.9 CHRONIC BACK PAIN: Status: ACTIVE | Noted: 2020-09-04

## 2020-09-04 LAB
ALBUMIN SERPL-MCNC: 4.1 G/DL (ref 3.5–5.2)
ALBUMIN/GLOB SERPL: 1.2 G/DL
ALP SERPL-CCNC: 87 U/L (ref 39–117)
ALT SERPL W P-5'-P-CCNC: 22 U/L (ref 1–41)
ANION GAP SERPL CALCULATED.3IONS-SCNC: 13.8 MMOL/L (ref 5–15)
AST SERPL-CCNC: 11 U/L (ref 1–40)
BILIRUB SERPL-MCNC: 0.4 MG/DL (ref 0–1.2)
BUN SERPL-MCNC: 9 MG/DL (ref 6–20)
BUN/CREAT SERPL: 7.8 (ref 7–25)
CALCIUM SPEC-SCNC: 10.1 MG/DL (ref 8.6–10.5)
CHLORIDE SERPL-SCNC: 104 MMOL/L (ref 98–107)
CO2 SERPL-SCNC: 24.2 MMOL/L (ref 22–29)
CREAT SERPL-MCNC: 1.15 MG/DL (ref 0.76–1.27)
GFR SERPL CREATININE-BSD FRML MDRD: 82 ML/MIN/1.73
GLOBULIN UR ELPH-MCNC: 3.3 GM/DL
GLUCOSE SERPL-MCNC: 133 MG/DL (ref 65–99)
POTASSIUM SERPL-SCNC: 3.9 MMOL/L (ref 3.5–5.2)
PROT SERPL-MCNC: 7.4 G/DL (ref 6–8.5)
SODIUM SERPL-SCNC: 142 MMOL/L (ref 136–145)

## 2020-09-04 PROCEDURE — 36415 COLL VENOUS BLD VENIPUNCTURE: CPT

## 2020-09-04 PROCEDURE — 99214 OFFICE O/P EST MOD 30 MIN: CPT | Performed by: INTERNAL MEDICINE

## 2020-09-04 PROCEDURE — 80053 COMPREHEN METABOLIC PANEL: CPT

## 2020-09-04 RX ORDER — PREDNISONE 10 MG/1
50 TABLET ORAL DAILY
COMMUNITY
Start: 2020-08-17 | End: 2020-09-29

## 2020-09-04 RX ORDER — OXYCODONE AND ACETAMINOPHEN 10; 325 MG/1; MG/1
1 TABLET ORAL EVERY 6 HOURS PRN
Qty: 60 TABLET | Refills: 0 | Status: SHIPPED | OUTPATIENT
Start: 2020-09-04 | End: 2020-09-04 | Stop reason: SDUPTHER

## 2020-09-04 RX ORDER — OXYCODONE AND ACETAMINOPHEN 10; 325 MG/1; MG/1
1 TABLET ORAL EVERY 8 HOURS PRN
Qty: 60 TABLET | Refills: 0 | Status: SHIPPED | OUTPATIENT
Start: 2020-09-04 | End: 2020-09-29 | Stop reason: SDUPTHER

## 2020-09-04 NOTE — TELEPHONE ENCOUNTER
[facility] STATES THAT PATIENTS INSURANCE ONLY ALLOWS 50 MORPHINE EQUIVALENCE DAILY ALLOWANCE UNLESS OVERRIDED BY THE DOCTOR.  PLEASE ADVISE    LADONNA [phone]

## 2020-09-04 NOTE — TELEPHONE ENCOUNTER
What does 50 morphine equivalents daily translated into in terms of the number of pills that are limited per prescription question
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
## 2020-09-14 DIAGNOSIS — M54.50 CHRONIC BILATERAL LOW BACK PAIN WITHOUT SCIATICA: ICD-10-CM

## 2020-09-14 DIAGNOSIS — Z98.890 S/P LUMBAR LAMINECTOMY: Chronic | ICD-10-CM

## 2020-09-14 DIAGNOSIS — G89.29 CHRONIC BILATERAL LOW BACK PAIN WITHOUT SCIATICA: ICD-10-CM

## 2020-09-14 RX ORDER — OXYCODONE AND ACETAMINOPHEN 10; 325 MG/1; MG/1
1 TABLET ORAL EVERY 8 HOURS PRN
Qty: 60 TABLET | Refills: 0 | OUTPATIENT
Start: 2020-09-14

## 2020-09-15 NOTE — TELEPHONE ENCOUNTER
S/W pt and explained that we cannot help what happened to his prescription, but we cannot refill his Oxycodone until 09.22.20. Pt verbalized understanding.
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
## 2020-09-29 ENCOUNTER — OFFICE VISIT (OUTPATIENT)
Dept: ORTHOPEDIC SURGERY | Facility: CLINIC | Age: 50
End: 2020-09-29

## 2020-09-29 VITALS — WEIGHT: 215 LBS | HEIGHT: 78 IN | BODY MASS INDEX: 24.88 KG/M2 | TEMPERATURE: 96.8 F

## 2020-09-29 DIAGNOSIS — M47.22 CERVICAL SPONDYLOSIS WITH RADICULOPATHY: ICD-10-CM

## 2020-09-29 DIAGNOSIS — R52 PAIN: Primary | ICD-10-CM

## 2020-09-29 DIAGNOSIS — G89.29 CHRONIC BILATERAL LOW BACK PAIN WITHOUT SCIATICA: ICD-10-CM

## 2020-09-29 DIAGNOSIS — Z98.890 S/P LUMBAR LAMINECTOMY: Chronic | ICD-10-CM

## 2020-09-29 DIAGNOSIS — M54.50 CHRONIC BILATERAL LOW BACK PAIN WITHOUT SCIATICA: ICD-10-CM

## 2020-09-29 PROCEDURE — 72100 X-RAY EXAM L-S SPINE 2/3 VWS: CPT | Performed by: ORTHOPAEDIC SURGERY

## 2020-09-29 PROCEDURE — 72040 X-RAY EXAM NECK SPINE 2-3 VW: CPT | Performed by: ORTHOPAEDIC SURGERY

## 2020-09-29 PROCEDURE — 99213 OFFICE O/P EST LOW 20 MIN: CPT | Performed by: ORTHOPAEDIC SURGERY

## 2020-09-29 RX ORDER — GABAPENTIN 300 MG/1
CAPSULE ORAL
COMMUNITY
Start: 2020-09-24 | End: 2020-12-10 | Stop reason: SDUPTHER

## 2020-09-29 RX ORDER — OXYCODONE AND ACETAMINOPHEN 10; 325 MG/1; MG/1
1 TABLET ORAL EVERY 8 HOURS PRN
Qty: 50 TABLET | Refills: 0 | Status: SHIPPED | OUTPATIENT
Start: 2020-09-29 | End: 2020-12-01 | Stop reason: SDUPTHER

## 2020-09-29 NOTE — PROGRESS NOTES
He is 3 months out from a L4-5 laminectomy and fusion with instrumentation has residual back pain and is just being evaluated in pain clinic for this.  He had a drug test a week ago and has been off medication and is asking for something for pain relief.  Today he complains of numbness in the hands at night requiring him to straighten his arms to get relief he also complains of some neck pain.  No balance difficulties, bowel or bladder complaints.  On exam the neck is nontender he has good strength in the upper extremities but appears hyperreflexic but Power sign is negative and I cannot elicit clonus although he seems to have a little rigidity in the ankles.  In any event x-rays of the cervical spine show minimal spondylosis and look quite good.  No comparison views are available either here in the hospital.  Lumbar films show excellent position of the implant and bone is healing nicely compared to prior films.  I plan MRI scan of the cervical spine for further evaluation if it is normal he is going to need brain work-up elsewhere.  I told him to call the pain clinic make sure they are okay with Percocet and have given him 50 of these which will be a one-time prescription and thereafter he will need to get medicine from the pain clinic.  Finally I am going to see him back in 2 months or at least make an appointment in case there is any lingering issues but we should be settling a lot of this meantime.

## 2020-10-23 ENCOUNTER — HOSPITAL ENCOUNTER (OUTPATIENT)
Dept: MRI IMAGING | Facility: HOSPITAL | Age: 50
Discharge: HOME | End: 2020-10-23

## 2020-10-23 DIAGNOSIS — R52 PAIN: ICD-10-CM

## 2020-10-26 DIAGNOSIS — M54.50 CHRONIC BILATERAL LOW BACK PAIN WITHOUT SCIATICA: ICD-10-CM

## 2020-10-26 DIAGNOSIS — G89.29 CHRONIC BILATERAL LOW BACK PAIN WITHOUT SCIATICA: ICD-10-CM

## 2020-10-26 DIAGNOSIS — Z98.890 S/P LUMBAR LAMINECTOMY: Chronic | ICD-10-CM

## 2020-10-26 RX ORDER — OXYCODONE AND ACETAMINOPHEN 10; 325 MG/1; MG/1
1 TABLET ORAL EVERY 8 HOURS PRN
Qty: 50 TABLET | Refills: 0 | OUTPATIENT
Start: 2020-10-26

## 2020-10-26 NOTE — TELEPHONE ENCOUNTER
CALLED PATIENT AND RELAYED WILMAN RESPONSE. HE WA NOT HAPPY WITH THIS. SAID HE COULD NOT GET THE MRI DONE BECAUSE HE ARMS WERE IN SO MUCH PAIN AND NUMB.  HE WAS NOT HAPPY WITH THIS RESPONSE.  I TALKED TO WILMAN AND ZULMA ABOUT THIS.  KGW RECOMMENDED IF HE IS IN THAT MUCH PAIN TO GO THE ER.   HE VOICED UNDERSTANDING AT THE END OF THE CONFESSION.

## 2020-10-27 ENCOUNTER — TELEPHONE (OUTPATIENT)
Dept: ORTHOPEDIC SURGERY | Facility: CLINIC | Age: 50
End: 2020-10-27

## 2020-10-27 NOTE — TELEPHONE ENCOUNTER
Schedule one sent note stating cannot hold his arms up that long to have a mri and can't do it.  Is there anything else you want to order for him/dm

## 2020-10-27 NOTE — TELEPHONE ENCOUNTER
I do not even know what I am treating and we had in agreement that I was going to fill that one time.

## 2020-10-27 NOTE — TELEPHONE ENCOUNTER
I called patient and he said his arms are numb and hurt and he is not in pain management anymore and just wanted to know if you have something to give him for pain while he has his mri/please advise

## 2020-10-29 ENCOUNTER — TELEPHONE (OUTPATIENT)
Dept: ORTHOPEDIC SURGERY | Facility: CLINIC | Age: 50
End: 2020-10-29

## 2020-10-29 NOTE — TELEPHONE ENCOUNTER
Caller: RAMIN GRAHAM    Relationship:SELF     Best call back number: [phone]    What orders are you requesting (i.e. lab or imaging): MRI- PAIN IN BOTH ARMS AND NECK- ORDERED BY DR DELGADO        Where will you receive your lab/imaging services: PATIENT REQUESTING MRI BE DONE IN [facility] AREA    Additional notes: PLEASE CONTACT PATIENT .[phone].  THANK YOU.

## 2020-11-11 ENCOUNTER — TELEPHONE (OUTPATIENT)
Dept: INTERNAL MEDICINE | Age: 50
End: 2020-11-11

## 2020-11-11 DIAGNOSIS — G89.29 CHRONIC BILATERAL LOW BACK PAIN WITHOUT SCIATICA: Primary | ICD-10-CM

## 2020-11-11 DIAGNOSIS — M54.50 CHRONIC BILATERAL LOW BACK PAIN WITHOUT SCIATICA: Primary | ICD-10-CM

## 2020-11-12 ENCOUNTER — TELEPHONE (OUTPATIENT)
Dept: ORTHOPEDIC SURGERY | Facility: CLINIC | Age: 50
End: 2020-11-12

## 2020-11-13 NOTE — TELEPHONE ENCOUNTER
No can do: He is more likely to be off as result of the sarcoidosis than from anything else.  He would need to talk to the medicine doctor.

## 2020-11-16 NOTE — TELEPHONE ENCOUNTER
Patient advised his MRI was never completed and still needs a work note ASAP.  Maria is checking into why MRI not completed.  Ok to cover off work thru 12-1-2020 for now.

## 2020-12-01 ENCOUNTER — OFFICE VISIT (OUTPATIENT)
Dept: ORTHOPEDIC SURGERY | Facility: CLINIC | Age: 50
End: 2020-12-01

## 2020-12-01 VITALS — WEIGHT: 220 LBS | TEMPERATURE: 98.6 F | HEIGHT: 78 IN | BODY MASS INDEX: 25.45 KG/M2

## 2020-12-01 DIAGNOSIS — G89.29 CHRONIC BILATERAL LOW BACK PAIN WITHOUT SCIATICA: ICD-10-CM

## 2020-12-01 DIAGNOSIS — M47.22 CERVICAL SPONDYLOSIS WITH RADICULOPATHY: ICD-10-CM

## 2020-12-01 DIAGNOSIS — Z98.890 S/P LUMBAR LAMINECTOMY: Chronic | ICD-10-CM

## 2020-12-01 DIAGNOSIS — R52 PAIN: Primary | ICD-10-CM

## 2020-12-01 DIAGNOSIS — M54.50 CHRONIC BILATERAL LOW BACK PAIN WITHOUT SCIATICA: ICD-10-CM

## 2020-12-01 DIAGNOSIS — M43.16 SPONDYLOLISTHESIS OF LUMBAR REGION: ICD-10-CM

## 2020-12-01 PROCEDURE — 72100 X-RAY EXAM L-S SPINE 2/3 VWS: CPT | Performed by: ORTHOPAEDIC SURGERY

## 2020-12-01 PROCEDURE — 99214 OFFICE O/P EST MOD 30 MIN: CPT | Performed by: ORTHOPAEDIC SURGERY

## 2020-12-01 RX ORDER — OXYCODONE AND ACETAMINOPHEN 10; 325 MG/1; MG/1
1 TABLET ORAL EVERY 8 HOURS PRN
Qty: 40 TABLET | Refills: 0 | Status: SHIPPED | OUTPATIENT
Start: 2020-12-01 | End: 2021-12-02 | Stop reason: HOSPADM

## 2020-12-01 NOTE — PROGRESS NOTES
New patient or new problem visit    CC: He is now 5 months out from L4-5 laminectomy and fusion with instrumentation complains of back pain and numbness in the arms and legs    HPI: As above-he complains of stiffness and pain which appears unimproved by the surgery.  I did an urgent decompression of 4 5 while he was hospitalized for sarcoidosis.  He has had some neck complaints in the past indeed I ordered cervical MRI at last visit.  Currently is taking Percocet for pain which I am somewhat hesitant to prescribe as we have not pin down diagnosis for his continuing pain    PFSH: See attached    ROS no bowel or bladder complaints fever chills or weight loss    PE: Constitutional: Vital signs above-noted.  Awake, alert and oriented    Psychiatric: Affect and insight do not appear grossly disturbed.    Pulmonary: Breathing is unlabored, color is good.    Skin: Warm, dry and normal turgor    Cardiac: Radial pulses intact.  No edema.    Eyesight and hearing appear adequate for examination purposes      Musculoskeletal:  Posture is unremarkable to coronal and sagittal inspection.  Motion appears undisturbed.  The skin about the area is intact.  There is no palpable or visible deformity.  There is no local spasm. There is minimal tenderness to percussion and palpation of the spine.     Neurologic:  In the bilateral upper extremities there is no evidence of atrophy.  Motor function is undisturbed in shoulder abduction, elbow flexion, wrist extension, finger extension, triceps extension, or finger abduction.  Sensation is diminished by report in the legs below the knees and in the arms.  Reflexes are 2+ and symmetrical in the biceps, brachioradialis, and triceps. Dela Cruz test is negative.  Gait appears undisturbed.  Spurling test is negative.  I thought I caught few beats of clonus in the right leg but could not reproduce this regularly    XRAY: Plain film x-rays of the lumbar spine show a solid L4-5 fusion compared to prior  films.    Other: n/a    Impression: I suppose he could have cervical spondylosis with radiculopathy at least that is where the work-up is headed but I would not be surprised if the MRI is entirely negative still we need to research have possibility    Plan: MRI of the cervical spine and I will refill his pain meds for now he has an appointment with Dr. Bar on December 10 and long-term treatment of pain would need to be managed there is medical patient is involved.

## 2020-12-04 ENCOUNTER — TELEPHONE (OUTPATIENT)
Dept: ORTHOPEDIC SURGERY | Facility: CLINIC | Age: 50
End: 2020-12-04

## 2020-12-04 ENCOUNTER — HOSPITAL ENCOUNTER (OUTPATIENT)
Dept: MRI IMAGING | Facility: HOSPITAL | Age: 50
Discharge: HOME | End: 2020-12-04
Attending: ORTHOPAEDIC SURGERY

## 2020-12-04 DIAGNOSIS — M54.6 THORACIC SPINE PAIN: Primary | ICD-10-CM
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
## 2020-12-04 NOTE — TELEPHONE ENCOUNTER
Patrick from [facility] called stating he done a MRI on pt and wanted to speak to you about his findings ..the number to reach him at is [phone]
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
## 2020-12-10 ENCOUNTER — RESULTS ENCOUNTER (OUTPATIENT)
Dept: PAIN MEDICINE | Facility: CLINIC | Age: 50
End: 2020-12-10

## 2020-12-10 ENCOUNTER — OFFICE VISIT (OUTPATIENT)
Dept: PAIN MEDICINE | Facility: CLINIC | Age: 50
End: 2020-12-10

## 2020-12-10 VITALS
TEMPERATURE: 96 F | SYSTOLIC BLOOD PRESSURE: 139 MMHG | HEART RATE: 125 BPM | BODY MASS INDEX: 24.97 KG/M2 | HEIGHT: 78 IN | WEIGHT: 215.8 LBS | RESPIRATION RATE: 16 BRPM | OXYGEN SATURATION: 94 % | DIASTOLIC BLOOD PRESSURE: 93 MMHG

## 2020-12-10 DIAGNOSIS — G89.29 CHRONIC BILATERAL LOW BACK PAIN WITH BILATERAL SCIATICA: Primary | ICD-10-CM

## 2020-12-10 DIAGNOSIS — M54.42 CHRONIC BILATERAL LOW BACK PAIN WITH BILATERAL SCIATICA: Primary | ICD-10-CM

## 2020-12-10 DIAGNOSIS — M54.42 CHRONIC BILATERAL LOW BACK PAIN WITH BILATERAL SCIATICA: ICD-10-CM

## 2020-12-10 DIAGNOSIS — G89.29 CHRONIC BILATERAL LOW BACK PAIN WITH BILATERAL SCIATICA: ICD-10-CM

## 2020-12-10 DIAGNOSIS — M54.41 CHRONIC BILATERAL LOW BACK PAIN WITH BILATERAL SCIATICA: Primary | ICD-10-CM

## 2020-12-10 DIAGNOSIS — M54.41 CHRONIC BILATERAL LOW BACK PAIN WITH BILATERAL SCIATICA: ICD-10-CM

## 2020-12-10 DIAGNOSIS — Z98.890 S/P LUMBAR LAMINECTOMY: Chronic | ICD-10-CM

## 2020-12-10 LAB
POC AMPHETAMINES: NEGATIVE
POC BARBITURATES: NEGATIVE
POC BENZODIAZEPHINES: NEGATIVE
POC COCAINE: NEGATIVE
POC METHADONE: NEGATIVE
POC METHAMPHETAMINE SCREEN URINE: NEGATIVE
POC OPIATES: NEGATIVE
POC OXYCODONE: POSITIVE
POC PHENCYCLIDINE: NEGATIVE
POC PROPOXYPHENE: NEGATIVE
POC THC: POSITIVE
POC TRICYCLIC ANTIDEPRESSANTS: NEGATIVE

## 2020-12-10 PROCEDURE — 99204 OFFICE O/P NEW MOD 45 MIN: CPT | Performed by: ANESTHESIOLOGY

## 2020-12-10 PROCEDURE — 80305 DRUG TEST PRSMV DIR OPT OBS: CPT | Performed by: ANESTHESIOLOGY

## 2020-12-10 RX ORDER — GABAPENTIN 300 MG/1
CAPSULE ORAL
Qty: 180 CAPSULE | Refills: 0 | Status: SHIPPED | OUTPATIENT
Start: 2020-12-10 | End: 2021-01-08 | Stop reason: SDUPTHER

## 2020-12-10 NOTE — PATIENT INSTRUCTIONS
Some common side effects of gabapentin and/or pregabalin include sleepiness, swelling in hands or feet, depression, blurred vision, and drowsiness.  Please contact the clinic immediately with any significant side effects so that the treatment plan may be adjusted in a safe manner.

## 2020-12-16 ENCOUNTER — TELEPHONE (OUTPATIENT)
Dept: ORTHOPEDIC SURGERY | Facility: CLINIC | Age: 50
End: 2020-12-16

## 2020-12-16 NOTE — TELEPHONE ENCOUNTER
It is not for that reason:  The patient has a cervical cord infarct and this has nothing to do with hi lumbar spinal surgery.  And he is supposed to see NEUROLOGY, not neurosurgery.

## 2020-12-16 NOTE — TELEPHONE ENCOUNTER
Dr. Bustillo you wanted to send Bonifacio Joshua to Neuro for back pain and sent to Dr. Bharta Maloney and Kate from that office called and said since his laminectomy was in July it has not been a year and they cannot see him.  Please advise/dm

## 2020-12-16 NOTE — TELEPHONE ENCOUNTER
Patient has made a referral to neurology and is requesting that the patient see Dr. Dashawn Felix. Patient is agreeable

## 2020-12-16 NOTE — TELEPHONE ENCOUNTER
The referral was for neurology not neurosurgery. I am not sure who picked neurosurgery input and Dr. Maloney's name but that was never who he was supposed to see. Please make the patient an appointment to see Dr. Felix

## 2020-12-17 ENCOUNTER — TELEPHONE (OUTPATIENT)
Dept: INTERNAL MEDICINE | Age: 50
End: 2020-12-17

## 2020-12-21 ENCOUNTER — TELEPHONE (OUTPATIENT)
Dept: INTERNAL MEDICINE | Age: 50
End: 2020-12-21

## 2020-12-21 DIAGNOSIS — E83.52 HYPERCALCEMIA: Primary | ICD-10-CM

## 2020-12-21 DIAGNOSIS — R06.00 DYSPNEA, UNSPECIFIED TYPE: ICD-10-CM

## 2020-12-21 DIAGNOSIS — D86.0 SARCOIDOSIS OF LUNG (HCC): Chronic | ICD-10-CM

## 2020-12-21 RX ORDER — PREDNISONE 10 MG/1
TABLET ORAL
Qty: 270 TABLET | Refills: 0 | Status: SHIPPED | OUTPATIENT
Start: 2020-12-21 | End: 2022-02-07

## 2020-12-21 RX ORDER — PREDNISONE 10 MG/1
10 TABLET ORAL DAILY
COMMUNITY
End: 2020-12-21 | Stop reason: SDUPTHER

## 2020-12-21 NOTE — TELEPHONE ENCOUNTER
PATIENT CALLED FOR MED REQUEST OF    PREDNISONE 10 MG     USUALLY REFILLS THIS MEDICATION BUT HE IS OUT UNTIL JAN 3RD. HE IS HAVING A FLARE UP OF HIGH CALCIUM , WHICH HE IS HAVING FATIGUE, SHORTNESS OF BREATH, NAUSEA.   JUST TO HOLD HIM OVER UNTIL HE SEES DR. KINGSTON    HE HAS APPOINTMENT ON 1/12/2020        [address] [facility] - [phone]  - [phone]   [phone]    CALL BACK NUMBER [phone]    ADVISED DR. COLON MAY NEED TO SEE HIM.

## 2020-12-28 NOTE — TELEPHONE ENCOUNTER
Dr. Felix does not take patient insurance.  Who would you like me to send patient to?  Please advise

## 2020-12-29 ENCOUNTER — HOSPITAL ENCOUNTER (OUTPATIENT)
Dept: MRI IMAGING | Facility: HOSPITAL | Age: 50
Discharge: HOME | End: 2020-12-29
Attending: ORTHOPAEDIC SURGERY

## 2021-01-07 DIAGNOSIS — G89.29 CHRONIC BILATERAL LOW BACK PAIN WITHOUT SCIATICA: ICD-10-CM

## 2021-01-07 DIAGNOSIS — D86.0 SARCOIDOSIS OF LUNG (HCC): Chronic | ICD-10-CM

## 2021-01-07 DIAGNOSIS — M54.50 CHRONIC BILATERAL LOW BACK PAIN WITHOUT SCIATICA: ICD-10-CM

## 2021-01-07 DIAGNOSIS — E83.52 HYPERCALCEMIA: ICD-10-CM

## 2021-01-07 DIAGNOSIS — Z98.890 S/P LUMBAR LAMINECTOMY: Chronic | ICD-10-CM

## 2021-01-07 DIAGNOSIS — R06.00 DYSPNEA, UNSPECIFIED TYPE: ICD-10-CM

## 2021-01-07 RX ORDER — PREDNISONE 10 MG/1
TABLET ORAL
Qty: 270 TABLET | Refills: 0 | Status: CANCELLED | OUTPATIENT
Start: 2021-01-07

## 2021-01-07 RX ORDER — OXYCODONE AND ACETAMINOPHEN 10; 325 MG/1; MG/1
1 TABLET ORAL EVERY 8 HOURS PRN
Qty: 40 TABLET | Refills: 0 | Status: CANCELLED | OUTPATIENT
Start: 2021-01-07

## 2021-01-07 NOTE — TELEPHONE ENCOUNTER
Spoke with pt and told him per JW note on 12/1/2020 Dr. Bar would be taking over long term pain care. Pt gave verbal understanding.

## 2021-01-07 NOTE — TELEPHONE ENCOUNTER
Caller: Bonifacio Lewis    Relationship: Self    Best call back number:[phone]     Medication needed:   Requested Prescriptions     Pending Prescriptions Disp Refills   • predniSONE (DELTASONE) 10 MG tablet 270 tablet 0     Sig: PT TAKES 2 TABS PO QAM   2 TABS AT LUNCH AND 1 TAB PO QHS       When do you need the refill by: ASAP     What details did the patient provide when requesting the medication: N/A   Does the patient have less than a 3 day supply:  [x] Yes  [] No    What is the patient's preferred pharmacy: [address] - [phone] [facility] [phone] FX

## 2021-01-08 ENCOUNTER — OFFICE VISIT (OUTPATIENT)
Dept: PAIN MEDICINE | Facility: CLINIC | Age: 51
End: 2021-01-08

## 2021-01-08 VITALS
BODY MASS INDEX: 25.11 KG/M2 | OXYGEN SATURATION: 96 % | SYSTOLIC BLOOD PRESSURE: 157 MMHG | DIASTOLIC BLOOD PRESSURE: 106 MMHG | HEART RATE: 110 BPM | HEIGHT: 78 IN | WEIGHT: 217 LBS | RESPIRATION RATE: 20 BRPM | TEMPERATURE: 97.3 F

## 2021-01-08 DIAGNOSIS — M54.50 CHRONIC BILATERAL LOW BACK PAIN WITHOUT SCIATICA: ICD-10-CM

## 2021-01-08 DIAGNOSIS — G89.4 CHRONIC PAIN SYNDROME: ICD-10-CM

## 2021-01-08 DIAGNOSIS — M54.16 LUMBAR RADICULOPATHY: ICD-10-CM

## 2021-01-08 DIAGNOSIS — M54.41 CHRONIC BILATERAL LOW BACK PAIN WITH BILATERAL SCIATICA: Primary | ICD-10-CM

## 2021-01-08 DIAGNOSIS — Z98.890 S/P LUMBAR LAMINECTOMY: ICD-10-CM

## 2021-01-08 DIAGNOSIS — Z98.890 S/P LUMBAR LAMINECTOMY: Chronic | ICD-10-CM

## 2021-01-08 DIAGNOSIS — M54.42 CHRONIC BILATERAL LOW BACK PAIN WITH BILATERAL SCIATICA: Primary | ICD-10-CM

## 2021-01-08 DIAGNOSIS — G89.29 CHRONIC BILATERAL LOW BACK PAIN WITHOUT SCIATICA: ICD-10-CM

## 2021-01-08 DIAGNOSIS — G89.29 CHRONIC BILATERAL LOW BACK PAIN WITH BILATERAL SCIATICA: Primary | ICD-10-CM

## 2021-01-08 PROCEDURE — 99214 OFFICE O/P EST MOD 30 MIN: CPT | Performed by: ANESTHESIOLOGY

## 2021-01-08 RX ORDER — OXYCODONE AND ACETAMINOPHEN 10; 325 MG/1; MG/1
1 TABLET ORAL EVERY 8 HOURS PRN
Qty: 40 TABLET | Refills: 0 | OUTPATIENT
Start: 2021-01-08

## 2021-01-08 RX ORDER — GABAPENTIN 300 MG/1
CAPSULE ORAL
Qty: 45 CAPSULE | Refills: 0 | Status: SHIPPED | OUTPATIENT
Start: 2021-01-08 | End: 2021-12-21

## 2021-01-08 NOTE — TELEPHONE ENCOUNTER
Pt notified that med would need to go back to original MD who wrote.  Pt stated he understood and now is requesting Percocet refill to be done prior to wknd. MM
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
## 2021-01-08 NOTE — TELEPHONE ENCOUNTER
Patient advised that he is no longer being seen at pain management clinic due to personal reasons and is wanting a refill on oxyCODONE-acetaminophen (PERCOCET)  MG per tablet    [address] - [phone]  - [phone] FX    Patient is asking for a call back to confirm status either way.

## 2021-01-08 NOTE — TELEPHONE ENCOUNTER
"Patient advised that he needs to be evaluated at , and that we are not the original prescribing provider so therefor we can't give refills.     Patient said \"wow\" thanks, and hung up.  "

## 2021-01-08 NOTE — TELEPHONE ENCOUNTER
PATIENT IS TRYING TO GET FILLED BEFORE THE WEEKEND AND WOULD LIKE A STATUS UPDATE. HE IS AT A 7 PAIN THRESHOLD. PLEASE ADVISE.

## 2021-01-12 ENCOUNTER — TRANSCRIBE ORDERS (OUTPATIENT)
Dept: ADMINISTRATIVE | Facility: HOSPITAL | Age: 51
End: 2021-01-12

## 2021-01-12 DIAGNOSIS — D86.9 SARCOIDOSIS: ICD-10-CM

## 2021-01-12 DIAGNOSIS — R06.09 DYSPNEA ON EXERTION: Primary | ICD-10-CM

## 2021-01-12 NOTE — TELEPHONE ENCOUNTER
Patient called back on 01.12.2021 demanding that he get a RX called in for his percocet. He said that he refuses to go to urgent care as he was advised on 01.08.21.   He said that he refuses to be put in harms way by going to urgent care. I tried to explain to the patient that all uc's are taking the nessicary precautions with each patient and that he needs to be seen for this RX.   Patient continued to talk over me saying that we will send this rx in because it is something that Dr. Reyna gave him with no problem. I did advise the patient that the 3 providers here don't practice medicine like Dr. Reyna and when he does come in to establish care that doesn't mean that he will get the prescription. That will be at the providers discretion.      Patient was advised to seek treatment from UC as he was the other day, and then patient ended the call.

## 2021-01-13 ENCOUNTER — TELEPHONE (OUTPATIENT)
Dept: INTERNAL MEDICINE | Age: 51
End: 2021-01-13

## 2021-01-13 NOTE — TELEPHONE ENCOUNTER
Pt called and requested  a referral to [facility] for his on going back pain, numbness in arm.   Patient was made aware that I couldn't guarantee that the referral would be placed because he hadn't been seen by our providers, however I told him I would send a message.       Fax to [phone]

## 2021-01-14 DIAGNOSIS — Z98.1 HISTORY OF LUMBAR FUSION: Primary | ICD-10-CM

## 2021-01-14 DIAGNOSIS — M54.50 LUMBAR PAIN: ICD-10-CM

## 2021-01-15 ENCOUNTER — TELEPHONE (OUTPATIENT)
Dept: INTERNAL MEDICINE | Age: 51
End: 2021-01-15

## 2021-01-15 NOTE — TELEPHONE ENCOUNTER
Caller: Bonifacio Lewis    Relationship: Self    Best call back number: [phone]     What medication are you requesting: PERCOCET       What are your current symptoms: NUMBNESS, PAIN FROM BUTTOCKS TO HIS FEET.    How long have you been experiencing symptoms:ONGOING    Have you had these symptoms before:    [x] Yes  [] No    Have you been treated for these symptoms before:   [x] Yes  [] No    If a prescription is needed, what is your preferred pharmacy and phone number:  [address] [facility] - [phone]  - [phone] FX  [phone]     Additional notes:  MR. LEWIS CALLED STATING THAT HIS PAIN WAS A 10 THE OTHER DAY, HE WENT TO THE ER. TODAY HIS PAIN THRESH HOLD IS A 7,  HE SAYS THAT HE HAS CALLED HIS ORTHO DOCTOR  HAS DENIED HIM MEDICATION.     HE SAYS HE IS WANTING THE PERCOCET FILLED IMMEDIATELY, HE WOULD LIKE A CALL BACK FROM THE OFFICE.     MR. LEWIS FEELS LIKE HE IS NOT BEING ASSISTED BY THE [facility] OFFICE. HE WAS VERY UPSET.

## 2021-01-27 ENCOUNTER — HOSPITAL ENCOUNTER (OUTPATIENT)
Dept: CT IMAGING | Facility: HOSPITAL | Age: 51
Discharge: HOME | End: 2021-01-27
Admitting: INTERNAL MEDICINE

## 2021-01-27 DIAGNOSIS — R06.09 DYSPNEA ON EXERTION: ICD-10-CM

## 2021-01-27 DIAGNOSIS — D86.9 SARCOIDOSIS: ICD-10-CM

## 2021-01-27 PROCEDURE — 71250 CT THORAX DX C-: CPT

## 2021-04-01 NOTE — PROGRESS NOTES
"   LOS: 7 days    Patient Care Team:  Vinod Reyna MD as PCP - General (Internal Medicine)    Chief Complaint:    Chief Complaint   Patient presents with   • Constipation   • Nausea     Follow-up acute kidney injury on chronic kidney disease and severe hypercalcemia  Subjective     Interval History:   The patient is feeling better, he tolerated his food intake with no vomiting, no nausea no dysphagia no vomiting.  He denies any chest pain or shortness of air, no orthopnea or PND, no nausea or vomiting, no abdominal pain, no dysuria or gross hematuria, no lower extremity edema.    Objective     Vital Signs  Temp:  [97.4 °F (36.3 °C)-98.5 °F (36.9 °C)] 98 °F (36.7 °C)  Heart Rate:  [] 87  Resp:  [16-18] 18  BP: (110-126)/(68-83) 110/68    Flowsheet Rows      First Filed Value   Admission Height  198.1 cm (78\") Documented at 07/29/2020 2005   Admission Weight  82.7 kg (182 lb 6.4 oz) Documented at 07/29/2020 2237          No intake/output data recorded.  I/O last 3 completed shifts:  In: 1080 [P.O.:1080]  Out: -     Intake/Output Summary (Last 24 hours) at 8/6/2020 0748  Last data filed at 8/5/2020 1700  Gross per 24 hour   Intake 1080 ml   Output --   Net 1080 ml       Physical Exam:  General Appearance: alert, oriented x 3, no acute distress,   Skin: warm and dry  HEENT: pupils round and reactive to light, oral mucosa  Neck: supple, no JVD, trachea midline  Lungs: CTA, unlabored breathing effort  Heart: RRR, normal S1 and S2, no S3, no rub  Abdomen: soft, nontender,  present bowel sounds to auscultation  : no palpable bladder,  Extremities: no edema, cyanosis or clubbing  Neuro: normal speech and mental status         Results Review:    Results from last 7 days   Lab Units 08/05/20  0753 08/04/20  0723 08/03/20  0730   SODIUM mmol/L 137 138 132*   POTASSIUM mmol/L 2.9* 3.7 2.7*   CHLORIDE mmol/L 106 109* 100   CO2 mmol/L 22.4 20.1* 21.3*   BUN mg/dL 13 13 12   CREATININE mg/dL 1.27 1.36* 1.45*   CALCIUM " mg/dL 11.1* 12.4* 13.5*   BILIRUBIN mg/dL 0.7  --   --    ALK PHOS U/L 63  --   --    ALT (SGPT) U/L 18  --   --    AST (SGOT) U/L 12  --   --    GLUCOSE mg/dL 82 87 106*       Estimated Creatinine Clearance: 80.1 mL/min (by C-G formula based on SCr of 1.27 mg/dL).    Results from last 7 days   Lab Units 08/05/20  0753 08/04/20  0723 08/03/20  0730 08/02/20  1408   MAGNESIUM mg/dL 1.5* 1.8 1.5* 1.3*   PHOSPHORUS mg/dL  --  2.8 2.5 2.5       Results from last 7 days   Lab Units 08/04/20  0723   URIC ACID mg/dL 10.3*       Results from last 7 days   Lab Units 08/03/20  0730 08/02/20  0551 07/31/20  0716   WBC 10*3/mm3 13.53* 8.36 7.38   HEMOGLOBIN g/dL 12.7* 10.0* 12.4*   PLATELETS 10*3/mm3 364 280 295               Imaging Results (Last 24 Hours)     ** No results found for the last 24 hours. **          famotidine 20 mg Oral BID AC   predniSONE 60 mg Oral Daily With Breakfast   sodium chloride 10 mL Intravenous Q12H   sucralfate 1 g Oral 4x Daily AC & at Bedtime       sodium chloride 125 mL/hr Last Rate: 125 mL/hr (08/05/20 0651)       Medication Review:   Current Facility-Administered Medications   Medication Dose Route Frequency Provider Last Rate Last Dose   • acetaminophen (TYLENOL) tablet 650 mg  650 mg Oral Q4H PRN Sophia Pennington APRN        Or   • acetaminophen (TYLENOL) 160 MG/5ML solution 650 mg  650 mg Oral Q4H PRN Sohpia Pennington APRN        Or   • acetaminophen (TYLENOL) suppository 650 mg  650 mg Rectal Q4H PRN Sophia Pennington APRN       • bisacodyl (DULCOLAX) suppository 10 mg  10 mg Rectal Daily PRN Sophia Pennington APRN   10 mg at 07/30/20 0423   • docusate sodium (COLACE) capsule 100 mg  100 mg Oral BID PRN Kathie Vasques MD       • famotidine (PEPCID) tablet 20 mg  20 mg Oral BID AC Steffen Meadows MD   20 mg at 08/05/20 1718   • nitroglycerin (NITROSTAT) SL tablet 0.4 mg  0.4 mg Sublingual Q5 Min PRN Sophia Pennington APRN       • ondansetron (ZOFRAN) injection 4 mg   4 mg Intravenous Q6H PRN Levi Warren MD   4 mg at 08/03/20 1153   • polyethylene glycol 3350 powder (packet)  17 g Oral Daily PRN Kathie Vasques MD       • potassium chloride 10 mEq in 100 mL IVPB  10 mEq Intravenous Q1H PRN Lvei Warren  mL/hr at 08/03/20 1553 10 mEq at 08/03/20 1553   • predniSONE (DELTASONE) tablet 60 mg  60 mg Oral Daily With Breakfast Aric Mojica MD   60 mg at 08/05/20 0856   • prochlorperazine (COMPAZINE) injection 5 mg  5 mg Intravenous Q6H PRN Sophia Pennington APRN   5 mg at 08/04/20 0001   • promethazine (PHENERGAN) tablet 25 mg  25 mg Oral Q6H PRN Sophia Pennington APRN   25 mg at 08/02/20 1738   • senna (SENOKOT) tablet 2 tablet  2 tablet Oral Nightly PRN Kathie Vasques MD       • sodium chloride 0.9 % flush 10 mL  10 mL Intravenous PRN Natalio Murillo MD       • sodium chloride 0.9 % flush 10 mL  10 mL Intravenous Q12H Sophia Pennington APRN   10 mL at 08/05/20 2021   • sodium chloride 0.9 % flush 10 mL  10 mL Intravenous PRN Sophia Pennington APRN   10 mL at 08/04/20 0904   • sodium chloride 0.9 % infusion  125 mL/hr Intravenous Continuous Robert Salvador  mL/hr at 08/05/20 0651 125 mL/hr at 08/05/20 0651   • sucralfate (CARAFATE) tablet 1 g  1 g Oral 4x Daily AC & at Bedtime Mart Morgan MD   1 g at 08/05/20 2021       Assessment/Plan    1.  Acute kidney injury on chronic kidney disease stage IV associated with severe hypercalcemia, creatinine creatinine yesterday was down to 1.27, his calcium down to 11.1, electrolytes within acceptable range other than potassium 2.9 and magnesium 1.5.  His phosphorus yesterday was 2.8.  This a.m. labs still pending  2.  Hypercalcemia associated with sarcoidosis calcium initially was 14.4 down to 10.4 and on Monday was up to 13.5, he was given pamidronate and his calcium yesterday was down to 11.1.    3.  Hypokalemia potassium yesterday was 2.9, associated with  hypomagnesemia  4.  Hyponatremia resolved.  5.  Sarcoidosis  6.  Chronic back pain      Plan:  1.  Continue the same treatment  2.  Check the lab results this morning and adjust the treatment as needed  3.  Surveillance labs        Robert Salvador MD  08/06/20  07:48     Double Island Pedicle Flap Text: The defect edges were debeveled with a #15 scalpel blade.  Given the location of the defect, shape of the defect and the proximity to free margins a double island pedicle advancement flap was deemed most appropriate.  Using a sterile surgical marker, an appropriate advancement flap was drawn incorporating the defect, outlining the appropriate donor tissue and placing the expected incisions within the relaxed skin tension lines where possible.    The area thus outlined was incised deep to adipose tissue with a #15 scalpel blade.  The skin margins were undermined to an appropriate distance in all directions around the primary defect and laterally outward around the island pedicle utilizing iris scissors.  There was minimal undermining beneath the pedicle flap.

## 2021-05-15 VITALS
HEART RATE: 90 BPM | WEIGHT: 222.5 LBS | SYSTOLIC BLOOD PRESSURE: 108 MMHG | DIASTOLIC BLOOD PRESSURE: 68 MMHG | BODY MASS INDEX: 26.27 KG/M2 | RESPIRATION RATE: 15 BRPM | OXYGEN SATURATION: 96 % | HEIGHT: 77 IN | TEMPERATURE: 98.7 F

## 2021-10-04 ENCOUNTER — TRANSCRIBE ORDERS (OUTPATIENT)
Dept: PULMONOLOGY | Facility: HOSPITAL | Age: 51
End: 2021-10-04

## 2021-10-04 ENCOUNTER — LAB (OUTPATIENT)
Dept: LAB | Facility: HOSPITAL | Age: 51
End: 2021-10-04

## 2021-10-04 DIAGNOSIS — D86.9 SARCOIDOSIS: Primary | ICD-10-CM

## 2021-10-04 DIAGNOSIS — D86.9 SARCOIDOSIS: ICD-10-CM

## 2021-10-04 LAB
25(OH)D3 SERPL-MCNC: 24.8 NG/ML (ref 30–100)
ALBUMIN SERPL-MCNC: 3.9 G/DL (ref 3.5–5.2)
ALBUMIN/GLOB SERPL: 1.4 G/DL
ALP SERPL-CCNC: 78 U/L (ref 39–117)
ALT SERPL W P-5'-P-CCNC: 19 U/L (ref 1–41)
ANION GAP SERPL CALCULATED.3IONS-SCNC: 12.5 MMOL/L (ref 5–15)
AST SERPL-CCNC: 15 U/L (ref 1–40)
BASOPHILS # BLD AUTO: 0.09 10*3/MM3 (ref 0–0.2)
BASOPHILS NFR BLD AUTO: 0.6 % (ref 0–1.5)
BILIRUB SERPL-MCNC: <0.2 MG/DL (ref 0–1.2)
BUN SERPL-MCNC: 17 MG/DL (ref 6–20)
BUN/CREAT SERPL: 15.9 (ref 7–25)
CA-I BLD-MCNC: 5.6 MG/DL (ref 4.6–5.4)
CA-I SERPL ISE-MCNC: 1.41 MMOL/L (ref 1.15–1.35)
CALCIUM SPEC-SCNC: 9.6 MG/DL (ref 8.6–10.5)
CHLORIDE SERPL-SCNC: 104 MMOL/L (ref 98–107)
CO2 SERPL-SCNC: 22.5 MMOL/L (ref 22–29)
CREAT SERPL-MCNC: 1.07 MG/DL (ref 0.76–1.27)
DEPRECATED RDW RBC AUTO: 45.3 FL (ref 37–54)
EOSINOPHIL # BLD AUTO: 0 10*3/MM3 (ref 0–0.4)
EOSINOPHIL NFR BLD AUTO: 0 % (ref 0.3–6.2)
ERYTHROCYTE [DISTWIDTH] IN BLOOD BY AUTOMATED COUNT: 13.5 % (ref 12.3–15.4)
GFR SERPL CREATININE-BSD FRML MDRD: 89 ML/MIN/1.73
GLOBULIN UR ELPH-MCNC: 2.7 GM/DL
GLUCOSE SERPL-MCNC: 140 MG/DL (ref 65–99)
HBA1C MFR BLD: 6.02 % (ref 4.8–5.6)
HCT VFR BLD AUTO: 42.2 % (ref 37.5–51)
HGB BLD-MCNC: 14.1 G/DL (ref 13–17.7)
IMM GRANULOCYTES # BLD AUTO: 0.72 10*3/MM3 (ref 0–0.05)
IMM GRANULOCYTES NFR BLD AUTO: 4.8 % (ref 0–0.5)
LYMPHOCYTES # BLD AUTO: 1.2 10*3/MM3 (ref 0.7–3.1)
LYMPHOCYTES NFR BLD AUTO: 8 % (ref 19.6–45.3)
MAGNESIUM SERPL-MCNC: 2 MG/DL (ref 1.6–2.6)
MCH RBC QN AUTO: 30.5 PG (ref 26.6–33)
MCHC RBC AUTO-ENTMCNC: 33.4 G/DL (ref 31.5–35.7)
MCV RBC AUTO: 91.3 FL (ref 79–97)
MONOCYTES # BLD AUTO: 0.39 10*3/MM3 (ref 0.1–0.9)
MONOCYTES NFR BLD AUTO: 2.6 % (ref 5–12)
NEUTROPHILS NFR BLD AUTO: 12.57 10*3/MM3 (ref 1.7–7)
NEUTROPHILS NFR BLD AUTO: 84 % (ref 42.7–76)
NRBC BLD AUTO-RTO: 0.1 /100 WBC (ref 0–0.2)
PHOSPHATE SERPL-MCNC: 2.4 MG/DL (ref 2.5–4.5)
PLATELET # BLD AUTO: 327 10*3/MM3 (ref 140–450)
PMV BLD AUTO: 9.3 FL (ref 6–12)
POTASSIUM SERPL-SCNC: 4.3 MMOL/L (ref 3.5–5.2)
PROT SERPL-MCNC: 6.6 G/DL (ref 6–8.5)
PTH-INTACT SERPL-MCNC: 41.5 PG/ML (ref 15–65)
RBC # BLD AUTO: 4.62 10*6/MM3 (ref 4.14–5.8)
SODIUM SERPL-SCNC: 139 MMOL/L (ref 136–145)
TSH SERPL DL<=0.05 MIU/L-ACNC: 0.15 UIU/ML (ref 0.27–4.2)
WBC # BLD AUTO: 14.97 10*3/MM3 (ref 3.4–10.8)

## 2021-10-04 PROCEDURE — 82330 ASSAY OF CALCIUM: CPT

## 2021-10-04 PROCEDURE — 82652 VIT D 1 25-DIHYDROXY: CPT

## 2021-10-04 PROCEDURE — 83036 HEMOGLOBIN GLYCOSYLATED A1C: CPT

## 2021-10-04 PROCEDURE — 80053 COMPREHEN METABOLIC PANEL: CPT

## 2021-10-04 PROCEDURE — 84443 ASSAY THYROID STIM HORMONE: CPT

## 2021-10-04 PROCEDURE — 85025 COMPLETE CBC W/AUTO DIFF WBC: CPT

## 2021-10-04 PROCEDURE — 83735 ASSAY OF MAGNESIUM: CPT

## 2021-10-04 PROCEDURE — 82306 VITAMIN D 25 HYDROXY: CPT

## 2021-10-04 PROCEDURE — 83970 ASSAY OF PARATHORMONE: CPT

## 2021-10-04 PROCEDURE — 84100 ASSAY OF PHOSPHORUS: CPT

## 2021-10-04 PROCEDURE — 36415 COLL VENOUS BLD VENIPUNCTURE: CPT

## 2021-10-06 LAB — 1,25(OH)2D SERPL-MCNC: 59.7 PG/ML (ref 19.9–79.3)

## 2021-11-28 ENCOUNTER — HOSPITAL ENCOUNTER (INPATIENT)
Facility: HOSPITAL | Age: 51
LOS: 1 days | Discharge: HOME | End: 2021-12-02
Attending: EMERGENCY MEDICINE | Admitting: HOSPITALIST

## 2021-11-28 ENCOUNTER — APPOINTMENT (OUTPATIENT)
Dept: GENERAL RADIOLOGY | Facility: HOSPITAL | Age: 51
End: 2021-11-28

## 2021-11-28 DIAGNOSIS — Z09 FOLLOW UP: ICD-10-CM

## 2021-11-28 DIAGNOSIS — G89.29 ACUTE EXACERBATION OF CHRONIC LOW BACK PAIN: Primary | ICD-10-CM

## 2021-11-28 DIAGNOSIS — M54.50 ACUTE EXACERBATION OF CHRONIC LOW BACK PAIN: Primary | ICD-10-CM

## 2021-11-28 PROBLEM — M54.9 BACK PAIN: Status: ACTIVE | Noted: 2021-11-28

## 2021-11-28 LAB
ALBUMIN SERPL-MCNC: 4.2 G/DL (ref 3.5–5.2)
ALBUMIN/GLOB SERPL: 1.6 G/DL
ALP SERPL-CCNC: 65 U/L (ref 39–117)
ALT SERPL W P-5'-P-CCNC: 25 U/L (ref 1–41)
ANION GAP SERPL CALCULATED.3IONS-SCNC: 10.2 MMOL/L (ref 5–15)
AST SERPL-CCNC: 28 U/L (ref 1–40)
BASOPHILS # BLD AUTO: 0.03 10*3/MM3 (ref 0–0.2)
BASOPHILS NFR BLD AUTO: 0.4 % (ref 0–1.5)
BILIRUB SERPL-MCNC: 0.3 MG/DL (ref 0–1.2)
BUN SERPL-MCNC: 16 MG/DL (ref 6–20)
BUN/CREAT SERPL: 12.4 (ref 7–25)
CALCIUM SPEC-SCNC: 9.3 MG/DL (ref 8.6–10.5)
CHLORIDE SERPL-SCNC: 106 MMOL/L (ref 98–107)
CO2 SERPL-SCNC: 25.8 MMOL/L (ref 22–29)
CREAT SERPL-MCNC: 1.29 MG/DL (ref 0.76–1.27)
CRP SERPL-MCNC: <0.3 MG/DL (ref 0–0.5)
DEPRECATED RDW RBC AUTO: 50.4 FL (ref 37–54)
EOSINOPHIL # BLD AUTO: 0 10*3/MM3 (ref 0–0.4)
EOSINOPHIL NFR BLD AUTO: 0 % (ref 0.3–6.2)
ERYTHROCYTE [DISTWIDTH] IN BLOOD BY AUTOMATED COUNT: 14.9 % (ref 12.3–15.4)
ERYTHROCYTE [SEDIMENTATION RATE] IN BLOOD: 5 MM/HR (ref 0–15)
GFR SERPL CREATININE-BSD FRML MDRD: 71 ML/MIN/1.73
GLOBULIN UR ELPH-MCNC: 2.6 GM/DL
GLUCOSE SERPL-MCNC: 118 MG/DL (ref 65–99)
HCT VFR BLD AUTO: 41.8 % (ref 37.5–51)
HGB BLD-MCNC: 14.4 G/DL (ref 13–17.7)
HOLD SPECIMEN: NORMAL
HOLD SPECIMEN: NORMAL
IMM GRANULOCYTES # BLD AUTO: 0.23 10*3/MM3 (ref 0–0.05)
IMM GRANULOCYTES NFR BLD AUTO: 2.9 % (ref 0–0.5)
LYMPHOCYTES # BLD AUTO: 0.85 10*3/MM3 (ref 0.7–3.1)
LYMPHOCYTES NFR BLD AUTO: 10.7 % (ref 19.6–45.3)
MCH RBC QN AUTO: 31.8 PG (ref 26.6–33)
MCHC RBC AUTO-ENTMCNC: 34.4 G/DL (ref 31.5–35.7)
MCV RBC AUTO: 92.3 FL (ref 79–97)
MONOCYTES # BLD AUTO: 0.27 10*3/MM3 (ref 0.1–0.9)
MONOCYTES NFR BLD AUTO: 3.4 % (ref 5–12)
NEUTROPHILS NFR BLD AUTO: 6.6 10*3/MM3 (ref 1.7–7)
NEUTROPHILS NFR BLD AUTO: 82.6 % (ref 42.7–76)
NRBC BLD AUTO-RTO: 0 /100 WBC (ref 0–0.2)
NT-PROBNP SERPL-MCNC: 72.1 PG/ML (ref 0–900)
PLATELET # BLD AUTO: 174 10*3/MM3 (ref 140–450)
PMV BLD AUTO: 10 FL (ref 6–12)
POTASSIUM SERPL-SCNC: 3.9 MMOL/L (ref 3.5–5.2)
PROT SERPL-MCNC: 6.8 G/DL (ref 6–8.5)
RBC # BLD AUTO: 4.53 10*6/MM3 (ref 4.14–5.8)
SARS-COV-2 RNA PNL SPEC NAA+PROBE: NOT DETECTED
SODIUM SERPL-SCNC: 142 MMOL/L (ref 136–145)
TROPONIN T SERPL-MCNC: <0.01 NG/ML (ref 0–0.03)
WBC NRBC COR # BLD: 7.98 10*3/MM3 (ref 3.4–10.8)
WHOLE BLOOD HOLD SPECIMEN: NORMAL
WHOLE BLOOD HOLD SPECIMEN: NORMAL

## 2021-11-28 PROCEDURE — 86140 C-REACTIVE PROTEIN: CPT | Performed by: EMERGENCY MEDICINE

## 2021-11-28 PROCEDURE — 85025 COMPLETE CBC W/AUTO DIFF WBC: CPT

## 2021-11-28 PROCEDURE — 83880 ASSAY OF NATRIURETIC PEPTIDE: CPT

## 2021-11-28 PROCEDURE — 63710000001 ONDANSETRON ODT 4 MG TABLET DISPERSIBLE: Performed by: EMERGENCY MEDICINE

## 2021-11-28 PROCEDURE — 80053 COMPREHEN METABOLIC PANEL: CPT

## 2021-11-28 PROCEDURE — 85652 RBC SED RATE AUTOMATED: CPT | Performed by: EMERGENCY MEDICINE

## 2021-11-28 PROCEDURE — 99284 EMERGENCY DEPT VISIT MOD MDM: CPT

## 2021-11-28 PROCEDURE — 93010 ELECTROCARDIOGRAM REPORT: CPT | Performed by: INTERNAL MEDICINE

## 2021-11-28 PROCEDURE — 93005 ELECTROCARDIOGRAM TRACING: CPT | Performed by: EMERGENCY MEDICINE

## 2021-11-28 PROCEDURE — G0378 HOSPITAL OBSERVATION PER HR: HCPCS

## 2021-11-28 PROCEDURE — 71045 X-RAY EXAM CHEST 1 VIEW: CPT

## 2021-11-28 PROCEDURE — 84484 ASSAY OF TROPONIN QUANT: CPT | Performed by: EMERGENCY MEDICINE

## 2021-11-28 PROCEDURE — 87635 SARS-COV-2 COVID-19 AMP PRB: CPT | Performed by: EMERGENCY MEDICINE

## 2021-11-28 PROCEDURE — 25010000002 HYDROMORPHONE 1 MG/ML SOLUTION: Performed by: EMERGENCY MEDICINE

## 2021-11-28 RX ORDER — LABETALOL HYDROCHLORIDE 5 MG/ML
10 INJECTION, SOLUTION INTRAVENOUS ONCE
Status: COMPLETED | OUTPATIENT
Start: 2021-11-29 | End: 2021-11-29

## 2021-11-28 RX ORDER — OXYCODONE AND ACETAMINOPHEN 7.5; 325 MG/1; MG/1
1 TABLET ORAL EVERY 4 HOURS PRN
Status: DISCONTINUED | OUTPATIENT
Start: 2021-11-28 | End: 2021-12-01

## 2021-11-28 RX ORDER — MYCOPHENOLATE MOFETIL 500 MG/1
1000 TABLET ORAL 2 TIMES DAILY
COMMUNITY

## 2021-11-28 RX ORDER — ONDANSETRON 4 MG/1
4 TABLET, ORALLY DISINTEGRATING ORAL ONCE
Status: COMPLETED | OUTPATIENT
Start: 2021-11-28 | End: 2021-11-28

## 2021-11-28 RX ORDER — ACETAMINOPHEN 325 MG/1
650 TABLET ORAL EVERY 4 HOURS PRN
Status: DISCONTINUED | OUTPATIENT
Start: 2021-11-28 | End: 2021-12-02 | Stop reason: HOSPADM

## 2021-11-28 RX ORDER — ALBUTEROL SULFATE 2.5 MG/3ML
2.5 SOLUTION RESPIRATORY (INHALATION) EVERY 6 HOURS PRN
Status: DISCONTINUED | OUTPATIENT
Start: 2021-11-28 | End: 2021-12-02 | Stop reason: HOSPADM

## 2021-11-28 RX ORDER — PREDNISONE 20 MG/1
20 TABLET ORAL
Status: DISCONTINUED | OUTPATIENT
Start: 2021-11-29 | End: 2021-11-30

## 2021-11-28 RX ORDER — ONDANSETRON 4 MG/1
4 TABLET, FILM COATED ORAL EVERY 6 HOURS PRN
Status: DISCONTINUED | OUTPATIENT
Start: 2021-11-28 | End: 2021-12-02 | Stop reason: HOSPADM

## 2021-11-28 RX ORDER — ACETAMINOPHEN 650 MG/1
650 SUPPOSITORY RECTAL EVERY 4 HOURS PRN
Status: DISCONTINUED | OUTPATIENT
Start: 2021-11-28 | End: 2021-11-30

## 2021-11-28 RX ORDER — ALBUTEROL SULFATE 90 UG/1
2 AEROSOL, METERED RESPIRATORY (INHALATION) EVERY 4 HOURS PRN
COMMUNITY
End: 2022-02-07

## 2021-11-28 RX ORDER — SODIUM CHLORIDE, SODIUM LACTATE, POTASSIUM CHLORIDE, CALCIUM CHLORIDE 600; 310; 30; 20 MG/100ML; MG/100ML; MG/100ML; MG/100ML
100 INJECTION, SOLUTION INTRAVENOUS CONTINUOUS
Status: DISCONTINUED | OUTPATIENT
Start: 2021-11-28 | End: 2021-11-28

## 2021-11-28 RX ORDER — SODIUM CHLORIDE 0.9 % (FLUSH) 0.9 %
10 SYRINGE (ML) INJECTION EVERY 12 HOURS SCHEDULED
Status: DISCONTINUED | OUTPATIENT
Start: 2021-11-28 | End: 2021-11-30

## 2021-11-28 RX ORDER — NITROGLYCERIN 0.4 MG/1
0.4 TABLET SUBLINGUAL
Status: DISCONTINUED | OUTPATIENT
Start: 2021-11-28 | End: 2021-11-30

## 2021-11-28 RX ORDER — ONDANSETRON 2 MG/ML
4 INJECTION INTRAMUSCULAR; INTRAVENOUS EVERY 6 HOURS PRN
Status: DISCONTINUED | OUTPATIENT
Start: 2021-11-28 | End: 2021-12-02 | Stop reason: HOSPADM

## 2021-11-28 RX ORDER — OXYCODONE HYDROCHLORIDE AND ACETAMINOPHEN 5; 325 MG/1; MG/1
2 TABLET ORAL ONCE
Status: COMPLETED | OUTPATIENT
Start: 2021-11-28 | End: 2021-11-28

## 2021-11-28 RX ORDER — SODIUM CHLORIDE 0.9 % (FLUSH) 0.9 %
10 SYRINGE (ML) INJECTION AS NEEDED
Status: DISCONTINUED | OUTPATIENT
Start: 2021-11-28 | End: 2021-12-02 | Stop reason: HOSPADM

## 2021-11-28 RX ORDER — ACETAMINOPHEN 160 MG/5ML
650 SOLUTION ORAL EVERY 4 HOURS PRN
Status: DISCONTINUED | OUTPATIENT
Start: 2021-11-28 | End: 2021-11-30

## 2021-11-28 RX ORDER — MYCOPHENOLATE MOFETIL 500 MG/1
1000 TABLET ORAL 2 TIMES DAILY
Status: DISCONTINUED | OUTPATIENT
Start: 2021-11-29 | End: 2021-11-30

## 2021-11-28 RX ADMIN — ONDANSETRON 4 MG: 4 TABLET, ORALLY DISINTEGRATING ORAL at 19:58

## 2021-11-28 RX ADMIN — OXYCODONE AND ACETAMINOPHEN 2 TABLET: 5; 325 TABLET ORAL at 19:57

## 2021-11-28 RX ADMIN — HYDROMORPHONE HYDROCHLORIDE 1 MG: 1 INJECTION, SOLUTION INTRAMUSCULAR; INTRAVENOUS; SUBCUTANEOUS at 19:59

## 2021-11-29 ENCOUNTER — APPOINTMENT (OUTPATIENT)
Dept: MRI IMAGING | Facility: HOSPITAL | Age: 51
End: 2021-11-29

## 2021-11-29 ENCOUNTER — APPOINTMENT (OUTPATIENT)
Dept: GENERAL RADIOLOGY | Facility: HOSPITAL | Age: 51
End: 2021-11-29

## 2021-11-29 ENCOUNTER — TELEPHONE (OUTPATIENT)
Dept: ORTHOPEDIC SURGERY | Facility: CLINIC | Age: 51
End: 2021-11-29

## 2021-11-29 PROBLEM — M54.50 ACUTE EXACERBATION OF CHRONIC LOW BACK PAIN: Status: ACTIVE | Noted: 2021-11-29

## 2021-11-29 PROBLEM — G89.29 ACUTE EXACERBATION OF CHRONIC LOW BACK PAIN: Status: ACTIVE | Noted: 2021-11-29

## 2021-11-29 LAB
ANION GAP SERPL CALCULATED.3IONS-SCNC: 8.2 MMOL/L (ref 5–15)
BUN SERPL-MCNC: 13 MG/DL (ref 6–20)
BUN/CREAT SERPL: 11.4 (ref 7–25)
CALCIUM SPEC-SCNC: 8.7 MG/DL (ref 8.6–10.5)
CHLORIDE SERPL-SCNC: 106 MMOL/L (ref 98–107)
CO2 SERPL-SCNC: 26.8 MMOL/L (ref 22–29)
CREAT SERPL-MCNC: 1.14 MG/DL (ref 0.76–1.27)
DEPRECATED RDW RBC AUTO: 51.4 FL (ref 37–54)
ERYTHROCYTE [DISTWIDTH] IN BLOOD BY AUTOMATED COUNT: 14.9 % (ref 12.3–15.4)
GFR SERPL CREATININE-BSD FRML MDRD: 82 ML/MIN/1.73
GLUCOSE SERPL-MCNC: 90 MG/DL (ref 65–99)
HCT VFR BLD AUTO: 36.7 % (ref 37.5–51)
HGB BLD-MCNC: 12.5 G/DL (ref 13–17.7)
MCH RBC QN AUTO: 31.7 PG (ref 26.6–33)
MCHC RBC AUTO-ENTMCNC: 34.1 G/DL (ref 31.5–35.7)
MCV RBC AUTO: 93.1 FL (ref 79–97)
PLATELET # BLD AUTO: 141 10*3/MM3 (ref 140–450)
PMV BLD AUTO: 9.9 FL (ref 6–12)
POTASSIUM SERPL-SCNC: 3.5 MMOL/L (ref 3.5–5.2)
QT INTERVAL: 390 MS
RBC # BLD AUTO: 3.94 10*6/MM3 (ref 4.14–5.8)
SODIUM SERPL-SCNC: 141 MMOL/L (ref 136–145)
WBC NRBC COR # BLD: 10.62 10*3/MM3 (ref 3.4–10.8)

## 2021-11-29 PROCEDURE — G0378 HOSPITAL OBSERVATION PER HR: HCPCS

## 2021-11-29 PROCEDURE — 72110 X-RAY EXAM L-2 SPINE 4/>VWS: CPT

## 2021-11-29 PROCEDURE — 99254 IP/OBS CNSLTJ NEW/EST MOD 60: CPT | Performed by: ORTHOPAEDIC SURGERY

## 2021-11-29 PROCEDURE — 63710000001 MYCOPHENOLATE MOFETIL PER 250 MG: Performed by: PHYSICIAN ASSISTANT

## 2021-11-29 PROCEDURE — 25010000002 LORAZEPAM PER 2 MG: Performed by: NURSE PRACTITIONER

## 2021-11-29 PROCEDURE — 80048 BASIC METABOLIC PNL TOTAL CA: CPT | Performed by: EMERGENCY MEDICINE

## 2021-11-29 PROCEDURE — 0 GADOBENATE DIMEGLUMINE 529 MG/ML SOLUTION: Performed by: EMERGENCY MEDICINE

## 2021-11-29 PROCEDURE — 72158 MRI LUMBAR SPINE W/O & W/DYE: CPT

## 2021-11-29 PROCEDURE — 85027 COMPLETE CBC AUTOMATED: CPT | Performed by: EMERGENCY MEDICINE

## 2021-11-29 PROCEDURE — A9577 INJ MULTIHANCE: HCPCS | Performed by: EMERGENCY MEDICINE

## 2021-11-29 RX ORDER — TIZANIDINE 4 MG/1
4 TABLET ORAL EVERY 8 HOURS PRN
Status: DISCONTINUED | OUTPATIENT
Start: 2021-11-29 | End: 2021-12-02 | Stop reason: HOSPADM

## 2021-11-29 RX ORDER — KETOROLAC TROMETHAMINE 30 MG/ML
30 INJECTION, SOLUTION INTRAMUSCULAR; INTRAVENOUS EVERY 6 HOURS PRN
Status: DISCONTINUED | OUTPATIENT
Start: 2021-11-29 | End: 2021-12-02

## 2021-11-29 RX ORDER — LORAZEPAM 2 MG/ML
1 INJECTION INTRAMUSCULAR ONCE
Status: COMPLETED | OUTPATIENT
Start: 2021-11-29 | End: 2021-11-29

## 2021-11-29 RX ADMIN — OXYCODONE HYDROCHLORIDE AND ACETAMINOPHEN 1 TABLET: 7.5; 325 TABLET ORAL at 18:12

## 2021-11-29 RX ADMIN — OXYCODONE HYDROCHLORIDE AND ACETAMINOPHEN 1 TABLET: 7.5; 325 TABLET ORAL at 04:13

## 2021-11-29 RX ADMIN — SODIUM CHLORIDE, PRESERVATIVE FREE 10 ML: 5 INJECTION INTRAVENOUS at 08:25

## 2021-11-29 RX ADMIN — OXYCODONE HYDROCHLORIDE AND ACETAMINOPHEN 1 TABLET: 7.5; 325 TABLET ORAL at 08:26

## 2021-11-29 RX ADMIN — LABETALOL HYDROCHLORIDE 10 MG: 5 INJECTION, SOLUTION INTRAVENOUS at 00:04

## 2021-11-29 RX ADMIN — MYCOPHENOLATE MOFETIL 1000 MG: 500 TABLET, FILM COATED ORAL at 00:04

## 2021-11-29 RX ADMIN — GADOBENATE DIMEGLUMINE 20 ML: 529 INJECTION, SOLUTION INTRAVENOUS at 08:49

## 2021-11-29 RX ADMIN — MYCOPHENOLATE MOFETIL 1000 MG: 500 TABLET, FILM COATED ORAL at 22:36

## 2021-11-29 RX ADMIN — LORAZEPAM 1 MG: 2 INJECTION INTRAMUSCULAR; INTRAVENOUS at 08:25

## 2021-11-29 RX ADMIN — OXYCODONE HYDROCHLORIDE AND ACETAMINOPHEN 1 TABLET: 7.5; 325 TABLET ORAL at 00:04

## 2021-11-29 RX ADMIN — SODIUM CHLORIDE, PRESERVATIVE FREE 10 ML: 5 INJECTION INTRAVENOUS at 00:12

## 2021-11-29 NOTE — TELEPHONE ENCOUNTER
Call out for Dr Bustillo   Observation unit   Nurse - Antonia   [phone]  Room - 110   The APRN over in this wing needs to speak with him     Unit also stated they called the provider line multiple times with no answer

## 2021-11-30 PROCEDURE — 63710000001 MYCOPHENOLATE MOFETIL PER 250 MG: Performed by: PHYSICIAN ASSISTANT

## 2021-11-30 PROCEDURE — G0378 HOSPITAL OBSERVATION PER HR: HCPCS

## 2021-11-30 PROCEDURE — 99222 1ST HOSP IP/OBS MODERATE 55: CPT | Performed by: PSYCHIATRY & NEUROLOGY

## 2021-11-30 PROCEDURE — 25010000002 HYDRALAZINE PER 20 MG: Performed by: HOSPITALIST

## 2021-11-30 PROCEDURE — 63710000001 PREDNISONE PER 1 MG: Performed by: PHYSICIAN ASSISTANT

## 2021-11-30 PROCEDURE — 63710000001 MYCOPHENOLATE MOFETIL PER 250 MG: Performed by: HOSPITALIST

## 2021-11-30 PROCEDURE — 99231 SBSQ HOSP IP/OBS SF/LOW 25: CPT | Performed by: ORTHOPAEDIC SURGERY

## 2021-11-30 PROCEDURE — 25010000002 KETOROLAC TROMETHAMINE PER 15 MG: Performed by: NURSE PRACTITIONER

## 2021-11-30 RX ORDER — PANTOPRAZOLE SODIUM 40 MG/1
40 TABLET, DELAYED RELEASE ORAL
Status: DISCONTINUED | OUTPATIENT
Start: 2021-12-01 | End: 2021-12-02 | Stop reason: HOSPADM

## 2021-11-30 RX ORDER — DIAZEPAM 5 MG/ML
5 INJECTION, SOLUTION INTRAMUSCULAR; INTRAVENOUS
Status: ACTIVE | OUTPATIENT
Start: 2021-12-01 | End: 2021-12-01

## 2021-11-30 RX ORDER — POLYETHYLENE GLYCOL 3350 17 G/17G
17 POWDER, FOR SOLUTION ORAL DAILY
Status: DISCONTINUED | OUTPATIENT
Start: 2021-11-30 | End: 2021-12-02 | Stop reason: HOSPADM

## 2021-11-30 RX ORDER — HYDRALAZINE HYDROCHLORIDE 20 MG/ML
10 INJECTION INTRAMUSCULAR; INTRAVENOUS EVERY 4 HOURS PRN
Status: DISCONTINUED | OUTPATIENT
Start: 2021-11-30 | End: 2021-12-01

## 2021-11-30 RX ORDER — MYCOPHENOLATE MOFETIL 500 MG/1
1000 TABLET ORAL 2 TIMES DAILY
Status: DISCONTINUED | OUTPATIENT
Start: 2021-11-30 | End: 2021-12-02 | Stop reason: HOSPADM

## 2021-11-30 RX ADMIN — TIZANIDINE 4 MG: 4 TABLET ORAL at 00:27

## 2021-11-30 RX ADMIN — HYDRALAZINE HYDROCHLORIDE 10 MG: 20 INJECTION INTRAMUSCULAR; INTRAVENOUS at 21:17

## 2021-11-30 RX ADMIN — OXYCODONE HYDROCHLORIDE AND ACETAMINOPHEN 1 TABLET: 7.5; 325 TABLET ORAL at 18:00

## 2021-11-30 RX ADMIN — SODIUM CHLORIDE, PRESERVATIVE FREE 10 ML: 5 INJECTION INTRAVENOUS at 09:02

## 2021-11-30 RX ADMIN — KETOROLAC TROMETHAMINE 30 MG: 30 INJECTION, SOLUTION INTRAMUSCULAR; INTRAVENOUS at 21:27

## 2021-11-30 RX ADMIN — OXYCODONE HYDROCHLORIDE AND ACETAMINOPHEN 1 TABLET: 7.5; 325 TABLET ORAL at 22:46

## 2021-11-30 RX ADMIN — PREDNISONE 20 MG: 20 TABLET ORAL at 09:02

## 2021-11-30 RX ADMIN — OXYCODONE HYDROCHLORIDE AND ACETAMINOPHEN 1 TABLET: 7.5; 325 TABLET ORAL at 14:16

## 2021-11-30 RX ADMIN — OXYCODONE HYDROCHLORIDE AND ACETAMINOPHEN 1 TABLET: 7.5; 325 TABLET ORAL at 00:27

## 2021-11-30 RX ADMIN — OXYCODONE HYDROCHLORIDE AND ACETAMINOPHEN 1 TABLET: 7.5; 325 TABLET ORAL at 04:56

## 2021-11-30 RX ADMIN — KETOROLAC TROMETHAMINE 30 MG: 30 INJECTION, SOLUTION INTRAMUSCULAR; INTRAVENOUS at 12:44

## 2021-11-30 RX ADMIN — MYCOPHENOLATE MOFETIL 1000 MG: 500 TABLET, FILM COATED ORAL at 09:03

## 2021-11-30 RX ADMIN — MYCOPHENOLATE MOFETIL 1000 MG: 500 TABLET ORAL at 22:48

## 2021-11-30 RX ADMIN — OXYCODONE HYDROCHLORIDE AND ACETAMINOPHEN 1 TABLET: 7.5; 325 TABLET ORAL at 09:02

## 2021-12-01 LAB
ANION GAP SERPL CALCULATED.3IONS-SCNC: 16.9 MMOL/L (ref 5–15)
BASOPHILS # BLD AUTO: 0.03 10*3/MM3 (ref 0–0.2)
BASOPHILS NFR BLD AUTO: 0.3 % (ref 0–1.5)
BUN SERPL-MCNC: 13 MG/DL (ref 6–20)
BUN/CREAT SERPL: 9.9 (ref 7–25)
CALCIUM SPEC-SCNC: 8.8 MG/DL (ref 8.6–10.5)
CHLORIDE SERPL-SCNC: 100 MMOL/L (ref 98–107)
CHOLEST SERPL-MCNC: 186 MG/DL (ref 0–200)
CLUMPED PLATELETS: PRESENT
CO2 SERPL-SCNC: 21.1 MMOL/L (ref 22–29)
CREAT SERPL-MCNC: 1.31 MG/DL (ref 0.76–1.27)
DEPRECATED RDW RBC AUTO: 47.7 FL (ref 37–54)
EOSINOPHIL # BLD AUTO: 0.05 10*3/MM3 (ref 0–0.4)
EOSINOPHIL NFR BLD AUTO: 0.4 % (ref 0.3–6.2)
ERYTHROCYTE [DISTWIDTH] IN BLOOD BY AUTOMATED COUNT: 14.5 % (ref 12.3–15.4)
GFR SERPL CREATININE-BSD FRML MDRD: 70 ML/MIN/1.73
GLUCOSE SERPL-MCNC: 147 MG/DL (ref 65–99)
HBA1C MFR BLD: 5.74 % (ref 4.8–5.6)
HCT VFR BLD AUTO: 37.5 % (ref 37.5–51)
HDLC SERPL-MCNC: 77 MG/DL (ref 40–60)
HGB BLD-MCNC: 13.3 G/DL (ref 13–17.7)
IMM GRANULOCYTES # BLD AUTO: 0.09 10*3/MM3 (ref 0–0.05)
IMM GRANULOCYTES NFR BLD AUTO: 0.8 % (ref 0–0.5)
LDLC SERPL CALC-MCNC: 71 MG/DL (ref 0–100)
LDLC/HDLC SERPL: 0.81 {RATIO}
LYMPHOCYTES # BLD AUTO: 3.05 10*3/MM3 (ref 0.7–3.1)
LYMPHOCYTES NFR BLD AUTO: 26.9 % (ref 19.6–45.3)
MCH RBC QN AUTO: 32.2 PG (ref 26.6–33)
MCHC RBC AUTO-ENTMCNC: 35.5 G/DL (ref 31.5–35.7)
MCV RBC AUTO: 90.8 FL (ref 79–97)
MONOCYTES # BLD AUTO: 0.75 10*3/MM3 (ref 0.1–0.9)
MONOCYTES NFR BLD AUTO: 6.6 % (ref 5–12)
NEUTROPHILS NFR BLD AUTO: 65 % (ref 42.7–76)
NEUTROPHILS NFR BLD AUTO: 7.35 10*3/MM3 (ref 1.7–7)
NRBC BLD AUTO-RTO: 0 /100 WBC (ref 0–0.2)
PLATELET # BLD AUTO: 122 10*3/MM3 (ref 140–450)
PMV BLD AUTO: 11.9 FL (ref 6–12)
POTASSIUM SERPL-SCNC: 3.5 MMOL/L (ref 3.5–5.2)
RBC # BLD AUTO: 4.13 10*6/MM3 (ref 4.14–5.8)
RBC MORPH BLD: NORMAL
SODIUM SERPL-SCNC: 138 MMOL/L (ref 136–145)
T4 FREE SERPL-MCNC: 1.52 NG/DL (ref 0.93–1.7)
TRIGL SERPL-MCNC: 235 MG/DL (ref 0–150)
TSH SERPL DL<=0.05 MIU/L-ACNC: 0.93 UIU/ML (ref 0.27–4.2)
VLDLC SERPL-MCNC: 38 MG/DL (ref 5–40)
WBC MORPH BLD: NORMAL
WBC NRBC COR # BLD: 11.32 10*3/MM3 (ref 3.4–10.8)

## 2021-12-01 PROCEDURE — 99232 SBSQ HOSP IP/OBS MODERATE 35: CPT | Performed by: ORTHOPAEDIC SURGERY

## 2021-12-01 PROCEDURE — 85025 COMPLETE CBC W/AUTO DIFF WBC: CPT | Performed by: HOSPITALIST

## 2021-12-01 PROCEDURE — 63710000001 PREDNISONE PER 1 MG: Performed by: HOSPITALIST

## 2021-12-01 PROCEDURE — 84439 ASSAY OF FREE THYROXINE: CPT | Performed by: HOSPITALIST

## 2021-12-01 PROCEDURE — 80048 BASIC METABOLIC PNL TOTAL CA: CPT | Performed by: HOSPITALIST

## 2021-12-01 PROCEDURE — 25010000002 HYDRALAZINE PER 20 MG: Performed by: HOSPITALIST

## 2021-12-01 PROCEDURE — 84443 ASSAY THYROID STIM HORMONE: CPT | Performed by: HOSPITALIST

## 2021-12-01 PROCEDURE — 99231 SBSQ HOSP IP/OBS SF/LOW 25: CPT | Performed by: PSYCHIATRY & NEUROLOGY

## 2021-12-01 PROCEDURE — 63710000001 MYCOPHENOLATE MOFETIL PER 250 MG: Performed by: HOSPITALIST

## 2021-12-01 PROCEDURE — 80061 LIPID PANEL: CPT | Performed by: HOSPITALIST

## 2021-12-01 PROCEDURE — 85007 BL SMEAR W/DIFF WBC COUNT: CPT | Performed by: HOSPITALIST

## 2021-12-01 PROCEDURE — 83036 HEMOGLOBIN GLYCOSYLATED A1C: CPT | Performed by: HOSPITALIST

## 2021-12-01 RX ORDER — DIAZEPAM 5 MG/ML
5 INJECTION, SOLUTION INTRAMUSCULAR; INTRAVENOUS
Status: ACTIVE | OUTPATIENT
Start: 2021-12-01 | End: 2021-12-02

## 2021-12-01 RX ORDER — AMLODIPINE BESYLATE 5 MG/1
5 TABLET ORAL
Status: DISCONTINUED | OUTPATIENT
Start: 2021-12-01 | End: 2021-12-02 | Stop reason: HOSPADM

## 2021-12-01 RX ORDER — OXYCODONE AND ACETAMINOPHEN 10; 325 MG/1; MG/1
1 TABLET ORAL EVERY 4 HOURS PRN
Status: DISCONTINUED | OUTPATIENT
Start: 2021-12-01 | End: 2021-12-02 | Stop reason: HOSPADM

## 2021-12-01 RX ADMIN — OXYCODONE HYDROCHLORIDE AND ACETAMINOPHEN 1 TABLET: 7.5; 325 TABLET ORAL at 13:56

## 2021-12-01 RX ADMIN — TIZANIDINE 4 MG: 4 TABLET ORAL at 18:25

## 2021-12-01 RX ADMIN — MYCOPHENOLATE MOFETIL 1000 MG: 500 TABLET ORAL at 08:57

## 2021-12-01 RX ADMIN — OXYCODONE HYDROCHLORIDE AND ACETAMINOPHEN 1 TABLET: 10; 325 TABLET ORAL at 22:49

## 2021-12-01 RX ADMIN — MYCOPHENOLATE MOFETIL 1000 MG: 500 TABLET ORAL at 20:29

## 2021-12-01 RX ADMIN — POLYETHYLENE GLYCOL 3350 17 G: 17 POWDER, FOR SOLUTION ORAL at 09:02

## 2021-12-01 RX ADMIN — OXYCODONE HYDROCHLORIDE AND ACETAMINOPHEN 1 TABLET: 10; 325 TABLET ORAL at 18:25

## 2021-12-01 RX ADMIN — TIZANIDINE 4 MG: 4 TABLET ORAL at 03:20

## 2021-12-01 RX ADMIN — AMLODIPINE BESYLATE 5 MG: 5 TABLET ORAL at 18:25

## 2021-12-01 RX ADMIN — HYDRALAZINE HYDROCHLORIDE 10 MG: 20 INJECTION INTRAMUSCULAR; INTRAVENOUS at 03:16

## 2021-12-01 RX ADMIN — PREDNISONE 20 MG: 20 TABLET ORAL at 08:57

## 2021-12-01 RX ADMIN — OXYCODONE HYDROCHLORIDE AND ACETAMINOPHEN 1 TABLET: 7.5; 325 TABLET ORAL at 06:48

## 2021-12-01 RX ADMIN — OXYCODONE HYDROCHLORIDE AND ACETAMINOPHEN 1 TABLET: 7.5; 325 TABLET ORAL at 03:01
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
## 2021-12-01 NOTE — PROGRESS NOTES
AVSS.  Awake alert oriented complaining of the leg numbness and back pain at present.  Neurologic status unchanged.  Reviewed Dr. Sy's consultation and repeat MRI scan of the cervical spine is planned.  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  Physical therapy can help I do not think there is much else for me to do in this instance but will see what the follow-up cervical spine MRI demonstrates.

## 2021-12-01 NOTE — SIGNIFICANT NOTE
12/01/21 1017   OTHER   Discipline occupational therapist   Rehab Time/Intention   Session Not Performed other (see comments)  (Spoke to pt at bedside, reports getting up ab lul, no ADL deficits despite pain and numbness, denies need for OT services. OT to sign off, please reconsult as needed.)

## 2021-12-01 NOTE — PAYOR COMM NOTE
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 gm/dL    A/G Ratio 1.6 g/dL    BUN/Creatinine Ratio 12.4 7.0 - 25.0    Anion Gap 10.2 5.0 - 15.0 mmol/L   BNP    Collection Time: 11/28/21  6:31 PM    Specimen: Blood   Result Value Ref Range    proBNP 72.1 0.0 - 900.0 pg/mL   Green Top (Gel)    Collection Time: 11/28/21  6:31 PM   Result Value Ref Range    Extra Tube Hold for add-ons.    Lavender Top    Collection Time: 11/28/21  6:31 PM   Result Value Ref Range    Extra Tube hold for add-on    Gold Top - SST    Collection Time: 11/28/21  6:31 PM   Result Value Ref Range    Extra Tube Hold for add-ons.    Light Blue Top    Collection Time: 11/28/21  6:31 PM   Result Value Ref Range    Extra Tube hold for add-on    CBC Auto Differential    Collection Time: 11/28/21  6:31 PM    Specimen: Blood   Result Value Ref Range    WBC 7.98 3.40 - 10.80 10*3/mm3    RBC 4.53 4.14 - 5.80 10*6/mm3    Hemoglobin 14.4 13.0 - 17.7 g/dL    Hematocrit 41.8 37.5 - 51.0 %    MCV 92.3 79.0 - 97.0 fL    MCH 31.8 26.6 - 33.0 pg    MCHC 34.4 31.5 - 35.7 g/dL    RDW 14.9 12.3 - 15.4 %    RDW-SD 50.4 37.0 - 54.0 fl    MPV 10.0 6.0 - 12.0 fL    Platelets 174 140  - 450 10*3/mm3    Neutrophil % 82.6 (H) 42.7 - 76.0 %    Lymphocyte % 10.7 (L) 19.6 - 45.3 %    Monocyte % 3.4 (L) 5.0 - 12.0 %    Eosinophil % 0.0 (L) 0.3 - 6.2 %    Basophil % 0.4 0.0 - 1.5 %    Immature Grans % 2.9 (H) 0.0 - 0.5 %    Neutrophils, Absolute 6.60 1.70 - 7.00 10*3/mm3    Lymphocytes, Absolute 0.85 0.70 - 3.10 10*3/mm3    Monocytes, Absolute 0.27 0.10 - 0.90 10*3/mm3    Eosinophils, Absolute 0.00 0.00 - 0.40 10*3/mm3    Basophils, Absolute 0.03 0.00 - 0.20 10*3/mm3    Immature Grans, Absolute 0.23 (H) 0.00 - 0.05 10*3/mm3    nRBC 0.0 0.0 - 0.2 /100 WBC   Troponin    Collection Time: 11/28/21  6:31 PM    Specimen: Blood   Result Value Ref Range    Troponin T <0.010 0.000 - 0.030 ng/mL   C-reactive Protein    Collection Time: 11/28/21  6:31 PM    Specimen: Blood   Result Value Ref Range    C-Reactive Protein <0.30 0.00 - 0.50 mg/dL   Sedimentation Rate    Collection Time: 11/28/21  6:31 PM    Specimen: Blood   Result Value Ref Range    Sed Rate 5 0 - 15 mm/hr   ECG 12 Lead    Collection Time: 11/28/21  7:23 PM   Result Value Ref Range    QT Interval 390 ms   COVID-19, MARIO IN-HOUSE CEPHEID/ANDRES NP SWAB IN TRANSPORT MEDIA 8-12 HR TAT - Swab, Nasopharynx    Collection Time: 11/28/21  9:36 PM    Specimen: Nasopharynx; Swab   Result Value Ref Range    COVID19 Not Detected Not Detected - Ref. Range       Ordered the above labs and independently reviewed the results.        RADIOLOGY  XR Chest 1 View    Result Date: 11/28/2021  SINGLE VIEW OF THE CHEST  HISTORY: Edema  COMPARISON: 01/27/2021  FINDINGS: Heart size is within normal limits. There is bibasilar scarring. Similar findings were present in January 2021. No pneumothorax or pleural effusion is seen. No definite acute infiltrate is identified.      Bibasilar scarring. Similar findings were present in January 2021.  This report was finalized on 11/28/2021 7:05 PM by Dr. Padmaja Owusu M.D.        I ordered the above noted radiological studies.  Reviewed by me and discussed with radiologist.  See dictation for official radiology interpretation.      PROCEDURES    Procedures      MEDICATIONS GIVEN IN ER    Medications   sodium chloride 0.9 % flush 10 mL (has no administration in time range)   nitroglycerin (NITROSTAT) SL tablet 0.4 mg (has no administration in time range)   sodium chloride 0.9 % flush 10 mL (has no administration in time range)   sodium chloride 0.9 % flush 10 mL (has no administration in time range)   ondansetron (ZOFRAN) tablet 4 mg (has no administration in time range)     Or   ondansetron (ZOFRAN) injection 4 mg (has no administration in time range)   lactated ringers infusion (has no administration in time range)   acetaminophen (TYLENOL) tablet 650 mg (has no administration in time range)     Or   acetaminophen (TYLENOL) 160 MG/5ML solution 650 mg (has no administration in time range)     Or   acetaminophen (TYLENOL) suppository 650 mg (has no administration in time range)   oxyCODONE-acetaminophen (PERCOCET) 7.5-325 MG per tablet 1 tablet (has no administration in time range)   albuterol (PROVENTIL) nebulizer solution 0.083% 2.5 mg/3mL (has no administration in time range)   predniSONE (DELTASONE) tablet 20 mg (has no administration in time range)   mycophenolate (CELLCEPT) tablet 1,000 mg (has no administration in time range)   HYDROmorphone (DILAUDID) injection 1 mg (1 mg Intramuscular Given 11/28/21 1959)   ondansetron ODT (ZOFRAN-ODT) disintegrating tablet 4 mg (4 mg Oral Given 11/28/21 1958)   oxyCODONE-acetaminophen (PERCOCET) 5-325 MG per tablet 2 tablet (2 tablets Oral Given 11/28/21 1957)         PROGRESS, DATA ANALYSIS, CONSULTS, AND MEDICAL DECISION MAKING    Any/all labs have been independently reviewed by me.  Any/all radiology studies have been reviewed by me and discussed with radiologist dictating the report.   EKG's independently viewed and interpreted by me.  Discussion below represents my analysis of pertinent  "findings related to patient's condition, differential diagnosis, treatment plan and final disposition.    Number of Diagnoses or Management Options     Amount and/or Complexity of Data Reviewed  Clinical lab tests:  Yes  Tests in the radiology section of CPT®:  No  Tests in the medicine section of CPT®:  Yes  Review and summarize past medical records: yes (see below)  Independent visualization of images, tracings, or specimens:  N/A      ED Course as of 11/28/21 2309   Sun Nov 28, 2021 1934 Prior record review-he is a history of spinal stenosis of lumbar region and is status post laminectomy with Dr. Bustilol.  He has been having some chronic pain related to that. [WC]   1934 Laboratory work-up is unremarkable-white count is normal, CRP is normal.  Sed rate is pending.  I suspect hypertension is due to his pain. [WC]   2039 Sed rate is normal. [WC]   2106 He has had minimal relief of pain.  Patient will be placed in observation for further testing and consultation with Dr. Bustillo/neurosurgery pending results. [WC]      ED Course User Index  [WC] Pérez Fagan MD       AS OF 23:09 EST VITALS:    BP - (!) 196/123  HR - 83  TEMP - 98 °F (36.7 °C)  02 SATS - 98%        DIAGNOSIS  Final diagnoses:   Acute exacerbation of chronic low back pain         DISPOSITION  Observation status           Pérez Fagan MD  11/28/21 2310      Electronically signed by Pérez Fagan MD at 11/28/21 2310     Lola Lobo RN at 11/28/21 2005        Pt states \"my legs been numb since my surgery in July Dr. Slater did my surgery. I noticed this week swelling in both legs. The pain is so bad I don't think I can go home.\"    Pt is hypertensive at this time, and appears uncomfortable constantly switching positions to try and ease the pain.    Patient was placed in face mask during first look triage.  Patient was wearing a face mask throughout encounter.  I wore personal protective equipment throughout the encounter.  Hand hygiene " was performed before and after patient encounter.       Lola Lobo, RN  11/28/21 2007      Electronically signed by Lola Lobo RN at 11/28/21 2007

## 2021-12-01 NOTE — SIGNIFICANT NOTE
12/01/21 8223   OTHER   Discipline physical therapist   Rehab Time/Intention   Session Not Performed other (see comments)  (Pt is up ad lul, no acute PT concerns at this time. Recommend pt follow up with outpatient PT. Pt is agreeable. No further needs at this time. will sign off.)

## 2021-12-02 ENCOUNTER — APPOINTMENT (OUTPATIENT)
Dept: OTHER | Facility: HOSPITAL | Age: 51
End: 2021-12-02

## 2021-12-02 ENCOUNTER — APPOINTMENT (OUTPATIENT)
Dept: MRI IMAGING | Facility: HOSPITAL | Age: 51
End: 2021-12-02

## 2021-12-02 VITALS
RESPIRATION RATE: 16 BRPM | HEIGHT: 77 IN | DIASTOLIC BLOOD PRESSURE: 95 MMHG | WEIGHT: 230 LBS | TEMPERATURE: 97.1 F | OXYGEN SATURATION: 99 % | HEART RATE: 76 BPM | SYSTOLIC BLOOD PRESSURE: 157 MMHG | BODY MASS INDEX: 27.16 KG/M2

## 2021-12-02 LAB
ANION GAP SERPL CALCULATED.3IONS-SCNC: 8.1 MMOL/L (ref 5–15)
BUN SERPL-MCNC: 13 MG/DL (ref 6–20)
BUN/CREAT SERPL: 9.7 (ref 7–25)
CALCIUM SPEC-SCNC: 10 MG/DL (ref 8.6–10.5)
CHLORIDE SERPL-SCNC: 103 MMOL/L (ref 98–107)
CO2 SERPL-SCNC: 31.9 MMOL/L (ref 22–29)
CREAT SERPL-MCNC: 1.34 MG/DL (ref 0.76–1.27)
GFR SERPL CREATININE-BSD FRML MDRD: 68 ML/MIN/1.73
GLUCOSE SERPL-MCNC: 90 MG/DL (ref 65–99)
POTASSIUM SERPL-SCNC: 4.5 MMOL/L (ref 3.5–5.2)
SODIUM SERPL-SCNC: 143 MMOL/L (ref 136–145)

## 2021-12-02 PROCEDURE — 80048 BASIC METABOLIC PNL TOTAL CA: CPT | Performed by: HOSPITALIST

## 2021-12-02 PROCEDURE — 25010000002 DIAZEPAM PER 5 MG: Performed by: PSYCHIATRY & NEUROLOGY

## 2021-12-02 PROCEDURE — 99232 SBSQ HOSP IP/OBS MODERATE 35: CPT | Performed by: ORTHOPAEDIC SURGERY

## 2021-12-02 PROCEDURE — 99231 SBSQ HOSP IP/OBS SF/LOW 25: CPT | Performed by: PSYCHIATRY & NEUROLOGY

## 2021-12-02 RX ORDER — DIAZEPAM 5 MG/ML
5 INJECTION, SOLUTION INTRAMUSCULAR; INTRAVENOUS
Status: COMPLETED | OUTPATIENT
Start: 2021-12-02 | End: 2021-12-02

## 2021-12-02 RX ORDER — PANTOPRAZOLE SODIUM 40 MG/1
40 TABLET, DELAYED RELEASE ORAL
Qty: 30 TABLET | Refills: 0 | Status: SHIPPED | OUTPATIENT
Start: 2021-12-03 | End: 2022-01-02

## 2021-12-02 RX ORDER — OXYCODONE AND ACETAMINOPHEN 10; 325 MG/1; MG/1
1 TABLET ORAL EVERY 4 HOURS PRN
Qty: 20 TABLET | Refills: 0 | Status: SHIPPED | OUTPATIENT
Start: 2021-12-02 | End: 2021-12-09

## 2021-12-02 RX ORDER — AMLODIPINE BESYLATE 5 MG/1
5 TABLET ORAL
Qty: 30 TABLET | Refills: 0 | Status: SHIPPED | OUTPATIENT
Start: 2021-12-03 | End: 2022-01-02

## 2021-12-02 RX ORDER — TIZANIDINE 4 MG/1
4 TABLET ORAL EVERY 8 HOURS PRN
Qty: 10 TABLET | Refills: 0 | Status: SHIPPED | OUTPATIENT
Start: 2021-12-02 | End: 2021-12-09

## 2021-12-02 RX ORDER — POLYETHYLENE GLYCOL 3350 17 G/17G
17 POWDER, FOR SOLUTION ORAL DAILY
Qty: 510 G | Refills: 0 | Status: SHIPPED | OUTPATIENT
Start: 2021-12-03 | End: 2022-01-02

## 2021-12-02 RX ADMIN — OXYCODONE HYDROCHLORIDE AND ACETAMINOPHEN 1 TABLET: 10; 325 TABLET ORAL at 04:16

## 2021-12-02 RX ADMIN — TIZANIDINE 4 MG: 4 TABLET ORAL at 14:15

## 2021-12-02 RX ADMIN — DIAZEPAM 5 MG: 5 INJECTION, SOLUTION INTRAMUSCULAR; INTRAVENOUS at 08:17

## 2021-12-02 RX ADMIN — OXYCODONE HYDROCHLORIDE AND ACETAMINOPHEN 1 TABLET: 10; 325 TABLET ORAL at 15:45

## 2021-12-02 RX ADMIN — AMLODIPINE BESYLATE 5 MG: 5 TABLET ORAL at 08:16

## 2021-12-02 RX ADMIN — OXYCODONE HYDROCHLORIDE AND ACETAMINOPHEN 1 TABLET: 10; 325 TABLET ORAL at 09:00

## 2021-12-02 RX ADMIN — POLYETHYLENE GLYCOL 3350 17 G: 17 POWDER, FOR SOLUTION ORAL at 08:17
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
## 2021-12-02 NOTE — NURSING NOTE
PT cannot see pt because he has not been evaluated because when they saw pt yesterday he was up ad lul so they did not need to see

## 2021-12-02 NOTE — PLAN OF CARE
Goal Outcome Evaluation:  Plan of Care Reviewed With: patient        Progress: improving  Outcome Summary: VSS, B/P elevated at intervals, started on norvasc daily. pain controlled well with po meds. patient ambulating ad lul without difficulty, Still waiting MRI

## 2021-12-02 NOTE — PLAN OF CARE
Goal Outcome Evaluation:  Plan of Care Reviewed With: patient        Progress: no change  Outcome Summary: Patient remains stable. VSS and voiding function is intact. Pain is managed with po meds, percocets increased to 10/325. Patient ambulating ad lul without issue. Continue to await MRI, MRI department called during day regarding delay.

## 2021-12-02 NOTE — PROGRESS NOTES
No new complaints.  Back pain may be slightly better but he still was hurting.  MRI of the cervical spine has been done since I saw him this morning and demonstrates signal change from C2 down to C7 or thereabouts which appears slightly less intense on this particular study but in a similar distribution is that noted on cervical MRI earlier this year in [facility].  This appears consistent with subacute combined degeneration with spinal cord.  Otherwise I see minimal disc bulging at C4-5 and mild disc bulging at C5-6 both in the midline neither of which are likely related to his current complaints.  Radiologist reading remains pending.  Thoracic MRI looks normal to my view again radiological staff evaluation remains pending however.  He has some lower lumbar degenerative changes at L5-S1, and a solid fusion and satisfactory decompression at L4-5.  His thoracic spine is normal and the cervical spine shows fairly pedestrian degenerative changes which are not surgical, and multilevel, stable posterior cord T2 signal change consistent with subacute combined degeneration.  This is not an entity which is amenable to surgical treatment and I am not well versed in its management and would defer to Dr. Ochoa in this regard.  I can see Mr. Wright back in the office next year if he wishes and otherwise he may wish to follow-up with the pain management or his family physician.  He is fine for discharge home today from my standpoint.  No restrictions.
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
## 2021-12-02 NOTE — NURSING NOTE
Placed call out to Dr. Ochoa to let him know pt unable to complete MRI; I already let Jolene OH know
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
## 2021-12-03 NOTE — CASE MANAGEMENT/SOCIAL WORK
Case Management Discharge Note      Final Note: Home.    Provided Post Acute Provider List?: Refused  Refused Provider List Comment: declined    Selected Continued Care - Discharged on 12/2/2021 Admission date: 11/28/2021 - Discharge disposition: Home or Self Care    Destination    No services have been selected for the patient.              Durable Medical Equipment    No services have been selected for the patient.              Dialysis/Infusion    No services have been selected for the patient.              Home Medical Care    No services have been selected for the patient.              Therapy    No services have been selected for the patient.              Community Resources    No services have been selected for the patient.              Community & DME    No services have been selected for the patient.                       Final Discharge Disposition Code: 01 - home or self-care

## 2021-12-03 NOTE — PAYOR COMM NOTE
"DISCHARGED  REF #[id number]      Ramin Graham (51 y.o. Male)             Date of Birth Social Security Number Address Home Phone MRN    1970  [id number]   [facility] [phone] [id number]    Amish Marital Status             None Single       Admission Date Admission Type Admitting Provider Attending Provider Department, Room/Bed    11/28/21 Emergency Tod Jin MD  [facility] [address], P879/1    Discharge Date Discharge Disposition Discharge Destination          12/2/2021 Home or Self Care              Attending Provider: (none)   Allergies: No Known Allergies    Isolation: None   Infection: None   Code Status: Prior   Advance Care Planning Activity    Ht: 195.6 cm (77\")   Wt: 104 kg (230 lb)    Admission Cmt: None   Principal Problem: None                Active Insurance as of 11/28/2021     Primary Coverage     Payor Plan Insurance Group Employer/Plan Group    [place] [id number]     Payor Plan Address Payor Plan Phone Number Payor Plan Fax Number Effective Dates    [employer] [address] [address] [phone]  6/1/2020 - None Entered    [facility] [address]       Subscriber Name Subscriber Birth Date Member ID       RAMIN GRAHAM 1970 [id number]                 Emergency Contacts      (Rel.) Home Phone Work Phone Mobile Phone    Codie Sainz (Mother) [phone] -- --            Discharge Order (From admission, onward)     Start     Ordered    12/02/21 1423  Discharge patient  Once        Expected Discharge Date: 12/02/21    Discharge Disposition: Home or Self Care    Physician of Record for Attribution - Please select from Treatment Team: TOD JIN [[id number]]    Review needed by CMO to determine Physician of Record: No       Question Answer Comment   Physician of Record for Attribution - Please select from Treatment Team TOD JIN    Review needed by CMO to determine Physician of Record No        " 12/02/21 4522

## 2021-12-12 ENCOUNTER — APPOINTMENT (OUTPATIENT)
Dept: GENERAL RADIOLOGY | Facility: HOSPITAL | Age: 51
End: 2021-12-12

## 2021-12-12 ENCOUNTER — HOSPITAL ENCOUNTER (EMERGENCY)
Facility: HOSPITAL | Age: 51
Discharge: HOME | End: 2021-12-12
Attending: EMERGENCY MEDICINE | Admitting: EMERGENCY MEDICINE

## 2021-12-12 ENCOUNTER — APPOINTMENT (OUTPATIENT)
Dept: CT IMAGING | Facility: HOSPITAL | Age: 51
End: 2021-12-12

## 2021-12-12 VITALS
RESPIRATION RATE: 20 BRPM | TEMPERATURE: 96.2 F | DIASTOLIC BLOOD PRESSURE: 103 MMHG | HEIGHT: 77 IN | OXYGEN SATURATION: 95 % | HEART RATE: 83 BPM | SYSTOLIC BLOOD PRESSURE: 157 MMHG | WEIGHT: 230 LBS | BODY MASS INDEX: 27.16 KG/M2

## 2021-12-12 DIAGNOSIS — R60.9 PERIPHERAL EDEMA: ICD-10-CM

## 2021-12-12 DIAGNOSIS — G89.29 CHRONIC MIDLINE LOW BACK PAIN WITHOUT SCIATICA: ICD-10-CM

## 2021-12-12 DIAGNOSIS — M54.50 CHRONIC MIDLINE LOW BACK PAIN WITHOUT SCIATICA: ICD-10-CM

## 2021-12-12 DIAGNOSIS — R10.30 LOWER ABDOMINAL PAIN: Primary | ICD-10-CM

## 2021-12-12 LAB
ALBUMIN SERPL-MCNC: 4.7 G/DL (ref 3.5–5.2)
ALBUMIN/GLOB SERPL: 1.6 G/DL
ALP SERPL-CCNC: 68 U/L (ref 39–117)
ALT SERPL W P-5'-P-CCNC: 25 U/L (ref 1–41)
ANION GAP SERPL CALCULATED.3IONS-SCNC: 12.8 MMOL/L (ref 5–15)
AST SERPL-CCNC: 20 U/L (ref 1–40)
BACTERIA UR QL AUTO: NORMAL /HPF
BASOPHILS # BLD AUTO: 0.02 10*3/MM3 (ref 0–0.2)
BASOPHILS NFR BLD AUTO: 0.2 % (ref 0–1.5)
BILIRUB SERPL-MCNC: 0.4 MG/DL (ref 0–1.2)
BILIRUB UR QL STRIP: NEGATIVE
BUN SERPL-MCNC: 17 MG/DL (ref 6–20)
BUN/CREAT SERPL: 11.7 (ref 7–25)
CALCIUM SPEC-SCNC: 10.3 MG/DL (ref 8.6–10.5)
CHLORIDE SERPL-SCNC: 104 MMOL/L (ref 98–107)
CLARITY UR: CLEAR
CO2 SERPL-SCNC: 26.2 MMOL/L (ref 22–29)
COLOR UR: YELLOW
CREAT SERPL-MCNC: 1.45 MG/DL (ref 0.76–1.27)
DEPRECATED RDW RBC AUTO: 47.9 FL (ref 37–54)
EOSINOPHIL # BLD AUTO: 0.01 10*3/MM3 (ref 0–0.4)
EOSINOPHIL NFR BLD AUTO: 0.1 % (ref 0.3–6.2)
ERYTHROCYTE [DISTWIDTH] IN BLOOD BY AUTOMATED COUNT: 14.2 % (ref 12.3–15.4)
GFR SERPL CREATININE-BSD FRML MDRD: 62 ML/MIN/1.73
GLOBULIN UR ELPH-MCNC: 3 GM/DL
GLUCOSE SERPL-MCNC: 107 MG/DL (ref 65–99)
GLUCOSE UR STRIP-MCNC: NEGATIVE MG/DL
HCT VFR BLD AUTO: 44.6 % (ref 37.5–51)
HGB BLD-MCNC: 15.4 G/DL (ref 13–17.7)
HGB UR QL STRIP.AUTO: ABNORMAL
HYALINE CASTS UR QL AUTO: NORMAL /LPF
IMM GRANULOCYTES # BLD AUTO: 0.08 10*3/MM3 (ref 0–0.05)
IMM GRANULOCYTES NFR BLD AUTO: 0.6 % (ref 0–0.5)
KETONES UR QL STRIP: NEGATIVE
LEUKOCYTE ESTERASE UR QL STRIP.AUTO: NEGATIVE
LYMPHOCYTES # BLD AUTO: 2.3 10*3/MM3 (ref 0.7–3.1)
LYMPHOCYTES NFR BLD AUTO: 18.3 % (ref 19.6–45.3)
MCH RBC QN AUTO: 31.8 PG (ref 26.6–33)
MCHC RBC AUTO-ENTMCNC: 34.5 G/DL (ref 31.5–35.7)
MCV RBC AUTO: 92.1 FL (ref 79–97)
MONOCYTES # BLD AUTO: 0.79 10*3/MM3 (ref 0.1–0.9)
MONOCYTES NFR BLD AUTO: 6.3 % (ref 5–12)
NEUTROPHILS NFR BLD AUTO: 74.5 % (ref 42.7–76)
NEUTROPHILS NFR BLD AUTO: 9.37 10*3/MM3 (ref 1.7–7)
NITRITE UR QL STRIP: NEGATIVE
NRBC BLD AUTO-RTO: 0 /100 WBC (ref 0–0.2)
NT-PROBNP SERPL-MCNC: 31.5 PG/ML (ref 0–900)
PH UR STRIP.AUTO: 5.5 [PH] (ref 5–8)
PLATELET # BLD AUTO: 219 10*3/MM3 (ref 140–450)
PMV BLD AUTO: 10.2 FL (ref 6–12)
POTASSIUM SERPL-SCNC: 4.1 MMOL/L (ref 3.5–5.2)
PROT SERPL-MCNC: 7.7 G/DL (ref 6–8.5)
PROT UR QL STRIP: NEGATIVE
RBC # BLD AUTO: 4.84 10*6/MM3 (ref 4.14–5.8)
RBC # UR STRIP: NORMAL /HPF
REF LAB TEST METHOD: NORMAL
SODIUM SERPL-SCNC: 143 MMOL/L (ref 136–145)
SP GR UR STRIP: >=1.03 (ref 1–1.03)
SQUAMOUS #/AREA URNS HPF: NORMAL /HPF
TROPONIN T SERPL-MCNC: <0.01 NG/ML (ref 0–0.03)
UROBILINOGEN UR QL STRIP: ABNORMAL
WBC # UR STRIP: NORMAL /HPF
WBC NRBC COR # BLD: 12.57 10*3/MM3 (ref 3.4–10.8)

## 2021-12-12 PROCEDURE — 81001 URINALYSIS AUTO W/SCOPE: CPT | Performed by: EMERGENCY MEDICINE

## 2021-12-12 PROCEDURE — 74177 CT ABD & PELVIS W/CONTRAST: CPT

## 2021-12-12 PROCEDURE — 99283 EMERGENCY DEPT VISIT LOW MDM: CPT

## 2021-12-12 PROCEDURE — 84484 ASSAY OF TROPONIN QUANT: CPT | Performed by: EMERGENCY MEDICINE

## 2021-12-12 PROCEDURE — 80053 COMPREHEN METABOLIC PANEL: CPT | Performed by: EMERGENCY MEDICINE

## 2021-12-12 PROCEDURE — 71045 X-RAY EXAM CHEST 1 VIEW: CPT

## 2021-12-12 PROCEDURE — 85025 COMPLETE CBC W/AUTO DIFF WBC: CPT | Performed by: EMERGENCY MEDICINE

## 2021-12-12 PROCEDURE — 25010000002 IOPAMIDOL 61 % SOLUTION: Performed by: EMERGENCY MEDICINE

## 2021-12-12 PROCEDURE — 83880 ASSAY OF NATRIURETIC PEPTIDE: CPT | Performed by: EMERGENCY MEDICINE

## 2021-12-12 RX ORDER — TIZANIDINE HYDROCHLORIDE 2 MG/1
2 CAPSULE, GELATIN COATED ORAL 3 TIMES DAILY
Qty: 20 CAPSULE | Refills: 0 | Status: SHIPPED | OUTPATIENT
Start: 2021-12-12 | End: 2021-12-21 | Stop reason: SDUPTHER

## 2021-12-12 RX ORDER — OXYCODONE AND ACETAMINOPHEN 10; 325 MG/1; MG/1
1 TABLET ORAL EVERY 6 HOURS PRN
Qty: 10 TABLET | Refills: 0 | Status: SHIPPED | OUTPATIENT
Start: 2021-12-12 | End: 2022-02-07

## 2021-12-12 RX ADMIN — IOPAMIDOL 85 ML: 612 INJECTION, SOLUTION INTRAVENOUS at 19:08

## 2021-12-12 NOTE — ED TRIAGE NOTES
Lower back pain X 1 month with numbness all 4 extremities. States was here last week but SX were never relieved.       Mask placed on patient in triage. Triage staff wore appropriate PPE during interaction with patient.
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
## 2021-12-13 ENCOUNTER — TELEPHONE (OUTPATIENT)
Dept: SOCIAL WORK | Facility: HOSPITAL | Age: 51
End: 2021-12-13

## 2021-12-13 NOTE — TELEPHONE ENCOUNTER
Attempted to contact pt per request of ER MD, all numbers listed are incorrect. Marisela Agrawal RN

## 2021-12-21 ENCOUNTER — TELEPHONE (OUTPATIENT)
Dept: ORTHOPEDIC SURGERY | Facility: CLINIC | Age: 51
End: 2021-12-21

## 2021-12-21 ENCOUNTER — OFFICE VISIT (OUTPATIENT)
Dept: INTERNAL MEDICINE | Facility: CLINIC | Age: 51
End: 2021-12-21

## 2021-12-21 VITALS
BODY MASS INDEX: 28.57 KG/M2 | DIASTOLIC BLOOD PRESSURE: 80 MMHG | WEIGHT: 242 LBS | TEMPERATURE: 98 F | SYSTOLIC BLOOD PRESSURE: 130 MMHG | OXYGEN SATURATION: 98 % | HEIGHT: 77 IN | HEART RATE: 99 BPM

## 2021-12-21 DIAGNOSIS — R10.30 LOWER ABDOMINAL PAIN: ICD-10-CM

## 2021-12-21 DIAGNOSIS — G89.29 CHRONIC BILATERAL LOW BACK PAIN WITH BILATERAL SCIATICA: Primary | ICD-10-CM

## 2021-12-21 DIAGNOSIS — M54.41 CHRONIC BILATERAL LOW BACK PAIN WITH BILATERAL SCIATICA: Primary | ICD-10-CM

## 2021-12-21 DIAGNOSIS — M54.42 CHRONIC BILATERAL LOW BACK PAIN WITH BILATERAL SCIATICA: Primary | ICD-10-CM

## 2021-12-21 DIAGNOSIS — Z98.890 S/P LUMBAR LAMINECTOMY: Chronic | ICD-10-CM

## 2021-12-21 DIAGNOSIS — D86.0 SARCOIDOSIS OF LUNG (HCC): Chronic | ICD-10-CM

## 2021-12-21 PROBLEM — E83.39 HYPOPHOSPHATEMIA: Status: RESOLVED | Noted: 2020-08-06 | Resolved: 2021-12-21

## 2021-12-21 PROBLEM — N17.9 AKI (ACUTE KIDNEY INJURY): Status: RESOLVED | Noted: 2020-07-30 | Resolved: 2021-12-21

## 2021-12-21 PROBLEM — R11.0 NAUSEA: Status: RESOLVED | Noted: 2020-07-29 | Resolved: 2021-12-21

## 2021-12-21 PROBLEM — E87.6 HYPOKALEMIA: Status: RESOLVED | Noted: 2020-08-06 | Resolved: 2021-12-21

## 2021-12-21 PROBLEM — R06.00 DYSPNEA: Status: RESOLVED | Noted: 2020-06-26 | Resolved: 2021-12-21

## 2021-12-21 PROBLEM — E83.52 HYPERCALCEMIA: Status: RESOLVED | Noted: 2020-07-30 | Resolved: 2021-12-21

## 2021-12-21 PROCEDURE — 99214 OFFICE O/P EST MOD 30 MIN: CPT | Performed by: NURSE PRACTITIONER

## 2021-12-21 RX ORDER — OXYCODONE AND ACETAMINOPHEN 10; 325 MG/1; MG/1
1 TABLET ORAL EVERY 6 HOURS PRN
Qty: 10 TABLET | Refills: 0 | Status: CANCELLED | OUTPATIENT
Start: 2021-12-21

## 2021-12-21 RX ORDER — TIZANIDINE HYDROCHLORIDE 2 MG/1
2 CAPSULE, GELATIN COATED ORAL 3 TIMES DAILY
Qty: 90 CAPSULE | Refills: 0 | Status: SHIPPED | OUTPATIENT
Start: 2021-12-21 | End: 2021-12-27 | Stop reason: SDUPTHER

## 2021-12-21 NOTE — TELEPHONE ENCOUNTER
There is nothing surgical which requires Percocet for pain.  he will need to call his primary care physician.

## 2021-12-21 NOTE — PROGRESS NOTES
"Chief Complaint  Establish Care, Back Pain, and Numbness (Pt has numbness all over.)    Subjective          Bonifacio Lewis presents to [facility] PR[facility]  History of Present Illness  This is a 50 y/o male presenting to office for establishment of care and recent f/u from L4-5 laminectomy medial facetectomy foraminotomy bilateral posterior lateral fusion back in July 2021.     Patient reports continued back problems with pain. Patient reports he has not seen Dr. Bustillo since surgery back in July 2021. Patient reports recent ER visits for back pain. Patient was noted to have seen [facility] in January 2021 but they could not prescribe opioids due to prior drug screen incidents. Patient reports he no longer uses any illicit drug use at all. Patient was recently in hospital from 11/28 until 12/2. Dr. Bustillo saw patient in hospital and had recommended no surgical intervention at this time. Patient was encouraged to f/u with pain management and for PT/OT.  Patient also recommended to continue with zanaflex as needed.     Patient continues norvasc daily-- patient denies any chest pain or heart palpitation. Patient does continue on mycophenolate for sarcoidosis. Patient continues following with Dr. Paniagua for pulmonary needs.         Objective   Vital Signs:   /80   Pulse 99   Temp 98 °F (36.7 °C) (Temporal)   Ht 195.6 cm (77.01\")   Wt 110 kg (242 lb)   SpO2 98%   BMI 28.69 kg/m²     Physical Exam  Vitals and nursing note reviewed.   Constitutional:       Appearance: Normal appearance.   HENT:      Head: Normocephalic and atraumatic.   Eyes:      Extraocular Movements: Extraocular movements intact.      Conjunctiva/sclera: Conjunctivae normal.      Pupils: Pupils are equal, round, and reactive to light.   Cardiovascular:      Rate and Rhythm: Normal rate and regular rhythm.      Pulses: Normal pulses.      Heart sounds: Normal heart sounds. No murmur heard.  No friction rub. " No gallop.    Pulmonary:      Effort: Pulmonary effort is normal. No respiratory distress.      Breath sounds: Normal breath sounds. No stridor. No wheezing, rhonchi or rales.   Chest:      Chest wall: No tenderness.   Abdominal:      General: Bowel sounds are normal. There is no distension.      Palpations: Abdomen is soft.      Tenderness: There is no abdominal tenderness.   Musculoskeletal:         General: Tenderness present.      Cervical back: Normal range of motion and neck supple.      Lumbar back: Spasms and tenderness present. Decreased range of motion.        Back:    Skin:     General: Skin is warm and dry.      Capillary Refill: Capillary refill takes less than 2 seconds.   Neurological:      General: No focal deficit present.      Mental Status: He is alert and oriented to person, place, and time. Mental status is at baseline.   Psychiatric:         Mood and Affect: Mood normal.         Behavior: Behavior normal.         Thought Content: Thought content normal.         Judgment: Judgment normal.        Result Review :                 Assessment and Plan    Diagnoses and all orders for this visit:    1. Chronic bilateral low back pain with bilateral sciatica (Primary)  Assessment & Plan:  Refill Zanaflex.   Referral to pain management-- [facility] requested along with PT services.   Patient should call Dr. Bustillo's office to f/u at his convenience.     Orders:  -     Ambulatory Referral to Pain Management  -     Ambulatory Referral to Physical Therapy Evaluate and treat, POST OP  -     TiZANidine (ZANAFLEX) 2 MG capsule; Take 1 capsule by mouth 3 (Three) Times a Day.  Dispense: 90 capsule; Refill: 0    2. S/P lumbar laminectomy  Assessment & Plan:  Referral to PT made.   Continue with light activity.   F/u with Dr. Bustillo at his convenience.     Orders:  -     Ambulatory Referral to Pain Management  -     Ambulatory Referral to Physical Therapy Evaluate and treat, POST OP  -     TiZANidine (ZANAFLEX)  2 MG capsule; Take 1 capsule by mouth 3 (Three) Times a Day.  Dispense: 90 capsule; Refill: 0    3. Sarcoidosis of lung (HCC)  Assessment & Plan:  Continue following with Dr. Paniagua.   Continue on cellcept, albuterol.   Patient currently not taking trelegy inhaler.       I spent 30 minutes caring for Bonifacio on this date of service. This time includes time spent by me in the following activities:preparing for the visit, reviewing tests, obtaining and/or reviewing a separately obtained history, performing a medically appropriate examination and/or evaluation , counseling and educating the patient/family/caregiver, ordering medications, tests, or procedures, documenting information in the medical record and care coordination  Follow Up   Return in about 6 weeks (around 2/1/2022) for Annual physical.  Patient was given instructions and counseling regarding his condition or for health maintenance advice. Please see specific information pulled into the AVS if appropriate.

## 2021-12-21 NOTE — ASSESSMENT & PLAN NOTE
Continue following with Dr. Paniagua.   Continue on cellcept, albuterol.   Patient currently not taking trelegy inhaler.

## 2021-12-21 NOTE — TELEPHONE ENCOUNTER
Caller: Bonifacio Lewis    Relationship to patient: Self    Best call back number: [phone]    Patient is needing: PATIENT IS STILL WAITING TO FIND OUT THE STATUS ON HIS OXYCODONE REFILL. [facility].

## 2021-12-21 NOTE — TELEPHONE ENCOUNTER
Patient was seen in hospital 11/28 and it looks like Martine WILSON put in referral today for pain management.

## 2021-12-21 NOTE — ASSESSMENT & PLAN NOTE
Refill Zanaflex.   Referral to [facility]-- [facility] along with PT services.   Patient should call Dr. Bustillo's office to f/u at his convenience.

## 2021-12-21 NOTE — TELEPHONE ENCOUNTER
Caller: RAMIN GRAHAM    Relationship:  SELF    Best call back number: [phone]    Requested Prescriptions:   oxyCODONE-acetaminophen (PERCOCET)  MG per tablet [63295] (Order [id number])        Pharmacy where request should be sent:  [facility]    Additional details provided by patient:PCP CANNOT REFILL MEDICATION AND DR. DELGADO HAS TO BE THE ONE TO REFILL    Does the patient have less than a 3 day supply:  [x] Yes  [] No    Rosales Lazcano Rep   12/21/21 10:07 EST

## 2021-12-27 ENCOUNTER — TELEPHONE (OUTPATIENT)
Dept: INTERNAL MEDICINE | Facility: CLINIC | Age: 51
End: 2021-12-27

## 2021-12-27 DIAGNOSIS — Z98.890 S/P LUMBAR LAMINECTOMY: Chronic | ICD-10-CM

## 2021-12-27 DIAGNOSIS — R10.30 LOWER ABDOMINAL PAIN: ICD-10-CM

## 2021-12-27 DIAGNOSIS — G89.29 CHRONIC BILATERAL LOW BACK PAIN WITH BILATERAL SCIATICA: ICD-10-CM

## 2021-12-27 DIAGNOSIS — M54.41 CHRONIC BILATERAL LOW BACK PAIN WITH BILATERAL SCIATICA: ICD-10-CM

## 2021-12-27 DIAGNOSIS — M54.42 CHRONIC BILATERAL LOW BACK PAIN WITH BILATERAL SCIATICA: ICD-10-CM

## 2021-12-27 RX ORDER — OXYCODONE AND ACETAMINOPHEN 10; 325 MG/1; MG/1
1 TABLET ORAL EVERY 6 HOURS PRN
Qty: 10 TABLET | Refills: 0 | OUTPATIENT
Start: 2021-12-27

## 2021-12-27 RX ORDER — TIZANIDINE HYDROCHLORIDE 2 MG/1
2 CAPSULE, GELATIN COATED ORAL 3 TIMES DAILY
Qty: 90 CAPSULE | Refills: 0 | Status: SHIPPED | OUTPATIENT
Start: 2021-12-27 | End: 2022-02-07

## 2021-12-27 NOTE — TELEPHONE ENCOUNTER
Caller: Bonifacio Lewis    Relationship to patient: Self    Best call back number: [phone]    Patient is needing: PATIENT CALLED BACK TO CHECK ON STATUS OF MEDICATION - UNABLE TO WARM TRANSFER

## 2021-12-27 NOTE — TELEPHONE ENCOUNTER
Spoke with pt, very upset because his primary care would not write a prescription for Norco. Per the pt, his PC insisted he get the rx from Dr. Bustillo who has already declined. Pt was referred to pain management and is waiting to hear from them.

## 2021-12-27 NOTE — TELEPHONE ENCOUNTER
Hub staff attempted to follow warm transfer process and was unsuccessful     Caller: Bonifacio Lewis    Relationship to patient: Self    Best call back number: [phone]    Patient is needing: STATUS OF PAIN MANAGEMENT AND PHYSICAL THERAPY. PHYSICAL THERAPY WAS GOING TO GO TO  AND PAIN MANAGEMENT REFERRAL WAS GOING TO GO TO [facility]. PLEASE CALL PATIENT WITH THE STATUS OF THESE REFERRALS.

## 2021-12-27 NOTE — TELEPHONE ENCOUNTER
Caller: Bonifacio Lewis    Relationship: Self    Best call back number: [phone]    Requested Prescriptions:   Requested Prescriptions     Pending Prescriptions Disp Refills   • oxyCODONE-acetaminophen (PERCOCET)  MG per tablet 10 tablet 0     Sig: Take 1 tablet by mouth Every 6 (Six) Hours As Needed for Moderate Pain .   • TiZANidine (ZANAFLEX) 2 MG capsule 90 capsule 0     Sig: Take 1 capsule by mouth 3 (Three) Times a Day.        Pharmacy where request should be sent: [id number] [address]  AT [facility] [phone] [facility] [phone] FX     Additional details provided by patient: PATIENT STATES HE IS GETTING RUN AROUND ABOUT OXY AND PHARMACY STATES THAT THEY NEED MORE INFORMATION FOR THE ZANAFLEX TO BE FILLED. PATIENT STATES HE IS IN PAIN AND EXPERIENCING NUMBNESS AGAIN.     Does the patient have less than a 3 day supply:  [x] Yes  [] No    Rosales Mann Rep   12/27/21 14:09 EST     ATTEMPTED TO WARM TRANSFER.

## 2022-01-13 ENCOUNTER — TRANSCRIBE ORDERS (OUTPATIENT)
Dept: ADMINISTRATIVE | Facility: HOSPITAL | Age: 52
End: 2022-01-13

## 2022-01-13 DIAGNOSIS — D86.9 SARCOIDOSIS: Primary | ICD-10-CM

## 2022-01-18 ENCOUNTER — TREATMENT (OUTPATIENT)
Dept: PHYSICAL THERAPY | Facility: CLINIC | Age: 52
End: 2022-01-18

## 2022-01-18 ENCOUNTER — OFFICE VISIT (OUTPATIENT)
Dept: ORTHOPEDIC SURGERY | Facility: CLINIC | Age: 52
End: 2022-01-18

## 2022-01-18 VITALS — TEMPERATURE: 97.4 F | HEIGHT: 77 IN | BODY MASS INDEX: 28.35 KG/M2 | WEIGHT: 240.1 LBS

## 2022-01-18 DIAGNOSIS — M54.50 CHRONIC BILATERAL LOW BACK PAIN WITHOUT SCIATICA: Primary | ICD-10-CM

## 2022-01-18 DIAGNOSIS — G89.29 CHRONIC BILATERAL LOW BACK PAIN WITHOUT SCIATICA: Primary | ICD-10-CM

## 2022-01-18 DIAGNOSIS — G89.29 ACUTE EXACERBATION OF CHRONIC LOW BACK PAIN: Primary | ICD-10-CM

## 2022-01-18 DIAGNOSIS — M54.50 ACUTE EXACERBATION OF CHRONIC LOW BACK PAIN: Primary | ICD-10-CM

## 2022-01-18 DIAGNOSIS — Z98.1 HISTORY OF LUMBAR SPINAL FUSION: ICD-10-CM

## 2022-01-18 DIAGNOSIS — R20.0 NUMBNESS IN BOTH LEGS: ICD-10-CM

## 2022-01-18 PROCEDURE — 97162 PT EVAL MOD COMPLEX 30 MIN: CPT | Performed by: PHYSICAL THERAPIST

## 2022-01-18 PROCEDURE — 99214 OFFICE O/P EST MOD 30 MIN: CPT | Performed by: ORTHOPAEDIC SURGERY

## 2022-01-18 RX ORDER — OXYCODONE AND ACETAMINOPHEN 10; 325 MG/1; MG/1
1 TABLET ORAL EVERY 8 HOURS PRN
Qty: 45 TABLET | Refills: 0 | Status: SHIPPED | OUTPATIENT
Start: 2022-01-18 | End: 2022-02-07
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
## 2022-01-18 NOTE — PROGRESS NOTES
He complains of continued back pain with some radiation into the hip.  Really did not see anything to go after on previous MRI.  He has been off and on of pain medication over time and was sent to pain clinic back in late December but I do not see that there was an appointment made for him.  His gait is unchanged today he does seem uncomfortable.  I went ahead and gave him a prescription for Percocet 10 1 p.o. every 8 hours #45 and no refills.  He will see Dr. Bar at some point for pain management but I did not promise him that Dr. Bar or that Valery Camargo his physician assistant would continue these medications.  I do not have anything to offer him and told him narcotic treatment of back pain is not what I do, but as I have treated him in the past I offered to take responsibility for this 1 prescription.  I saw no recent issues with Max.

## 2022-01-19 ENCOUNTER — TELEPHONE (OUTPATIENT)
Dept: ORTHOPEDIC SURGERY | Facility: CLINIC | Age: 52
End: 2022-01-19

## 2022-01-19 NOTE — TELEPHONE ENCOUNTER
Caller: Bonifacio Lewis    Relationship to patient: Self    Best call back number: [phone]    Patient is needing: PATIENT STATES HE NEEDS AN INSURANCE PREAUTH FOR THE OXYCODONE AND THE MUSCLE RELAXER THAT DR. DELGADO PRESCRIBED FOR HIM YESTERDAY

## 2022-01-20 ENCOUNTER — TELEPHONE (OUTPATIENT)
Dept: ORTHOPEDIC SURGERY | Facility: CLINIC | Age: 52
End: 2022-01-20

## 2022-01-20 NOTE — TELEPHONE ENCOUNTER
Patient called to check status of a pre authorization for Percocet and Zanaflex. Assured we would follow up, patient voiced understanding.

## 2022-01-21 ENCOUNTER — TELEPHONE (OUTPATIENT)
Dept: ORTHOPEDIC SURGERY | Facility: CLINIC | Age: 52
End: 2022-01-21

## 2022-01-21 RX ORDER — TIZANIDINE 2 MG/1
2 TABLET ORAL NIGHTLY PRN
OUTPATIENT
Start: 2022-01-21

## 2022-01-21 NOTE — TELEPHONE ENCOUNTER
Provider: SANDY  Caller: RAMIN  Phone Number: [phone]  Reason for Call:  PT IS CALLING TO TALK TO A NURSE ABOUT HIS RX.  REPORTS IT HAS BEEN AN ON GOING ISSUE FOR WEEKS AND THEN ASKED FOR ADMINISTRATION.     PER HUB WORK FLOW ATTEMPTED TO WARM TRANSFER

## 2022-01-21 NOTE — TELEPHONE ENCOUNTER
Call returned to the patient. Patient had several questions about refills on his medications as well as his appointment to pain management. In review of the records Dr. Bustillo did refill his Percocet however it was denied by his insurance stating that he exceeded the amount of narcotics they allow to be prescribed. They recommended that a prior authorization be started by the physician's office which we have done as of yesterday. I explained to the patient that it could take 1-5 business days before the insurance determined whether or not they would approve the medication and that the office would get in touch with him once we had heard back from the insurance company. I advised him that Dr. Bustillo cannot refill the Zanaflex since we did not initially prescribe it. If he does need a refill he needs to contact his primary care physician who prescribed it for him initially. I also discussed with him that he had a pain management referral entered in December by his PCP and records were faxed to [facility]. I spoke with the primary care physician's office and they stated that they had not heard back from [facility] but they did give the patient the phone number to contact them himself. Dr. Bustillo also entered a new referral to a [facility] pain management when he saw him this week. Patient was not aware of being given the phone number. Discussed that I would recommend he contact [facility] to see when they can see him. If they cannot see him in a timely fashion then he will contact the office to let us know he would prefer to see the [facility] physicians. Patient understands and agrees

## 2022-01-27 ENCOUNTER — TREATMENT (OUTPATIENT)
Dept: PHYSICAL THERAPY | Facility: CLINIC | Age: 52
End: 2022-01-27

## 2022-01-27 DIAGNOSIS — G89.29 CHRONIC BILATERAL LOW BACK PAIN WITHOUT SCIATICA: Primary | ICD-10-CM

## 2022-01-27 DIAGNOSIS — R20.0 NUMBNESS IN BOTH LEGS: ICD-10-CM

## 2022-01-27 DIAGNOSIS — M54.50 CHRONIC BILATERAL LOW BACK PAIN WITHOUT SCIATICA: Primary | ICD-10-CM

## 2022-01-27 DIAGNOSIS — Z98.1 HISTORY OF LUMBAR SPINAL FUSION: ICD-10-CM

## 2022-01-27 PROCEDURE — 97110 THERAPEUTIC EXERCISES: CPT | Performed by: PHYSICAL THERAPIST

## 2022-01-27 NOTE — PROGRESS NOTES
Physical Therapy Daily Progress Note    Patient: Bonifacio Lewis   : 1970  Diagnosis/ICD-10 Code:  Chronic bilateral low back pain without sciatica [M54.50, G89.29]  Referring practitioner: STEVE Willams  Date of Initial Visit: Type: THERAPY  Noted: 2022  Today's Date: 2022  Patient seen for 2 sessions           Subjective The legs stay numb.  I am ready to push strengthening.     Objective   See Exercise, Manual, and Modality Logs for complete treatment.   Issued back HEP for LTR, KTC, / HS stretch, bridge, supine march, hand plank, sit backs    Assessment/Plan    Bonifacio did well with most exercises except he is not comfortable on numb knees so will utilize Theraball next visit and he overestimated his strength with pulleys exercises that needs to be adjusted lower.        Timed:    Manual Therapy:    0     mins  85401;  Therapeutic Exercise:    43     mins  34531;     Neuromuscular Vickie:    0    mins  78229;    Therapeutic Activity:     0     mins  03054;     Gait Trainin     mins  43618;     Ultrasound:     0     mins  43105;    Electrical Stimulation:    0     mins  71287 ( );  Iontophoresis    0     mins 57477;  Aquatic Therapy    0     mins 12380;  Dry Needling              0     mins 77412/  (Self-pay)    Untimed:  Electrical Stimulation:    0     mins  07920 ( );  Traction:    0     mins  94996;     Timed Treatment:   43   mins   Total Treatment:     43   mins    Marleen Holt, PT  Physical Therapist    [place] License:[id number]

## 2022-02-02 ENCOUNTER — TREATMENT (OUTPATIENT)
Dept: PHYSICAL THERAPY | Facility: CLINIC | Age: 52
End: 2022-02-02

## 2022-02-02 DIAGNOSIS — M54.50 CHRONIC BILATERAL LOW BACK PAIN WITHOUT SCIATICA: Primary | ICD-10-CM

## 2022-02-02 DIAGNOSIS — R20.0 NUMBNESS IN BOTH LEGS: ICD-10-CM

## 2022-02-02 DIAGNOSIS — G89.29 CHRONIC BILATERAL LOW BACK PAIN WITHOUT SCIATICA: Primary | ICD-10-CM

## 2022-02-02 DIAGNOSIS — Z98.1 HISTORY OF LUMBAR SPINAL FUSION: ICD-10-CM

## 2022-02-02 PROCEDURE — 97110 THERAPEUTIC EXERCISES: CPT | Performed by: PHYSICAL THERAPIST

## 2022-02-02 NOTE — PROGRESS NOTES
Physical Therapy Daily Progress Note    Patient: Bonifacio Lewis   : 1970  Diagnosis/ICD-10 Code:  Chronic bilateral low back pain without sciatica [M54.50, G89.29]  Referring practitioner: STEVE Willams  Date of Initial Visit: Type: THERAPY  Noted: 2022  Today's Date: 2022  Patient seen for 3 sessions           Subjective No worse from exercises.    Objective   See Exercise, Manual, and Modality Logs for complete treatment.       Assessment/Plan    Bonifacio feels muscle effort in back extensors with directed exercises and does have some limitation in muscle endurance with rests between few repetitions and goal to increased his core muscle strength and muscle endurance.        Timed:    Manual Therapy:    0     mins  37459;  Therapeutic Exercise:    40     mins  26267;     Neuromuscular Vickie:    0    mins  60207;    Therapeutic Activity:     0     mins  25141;     Gait Trainin     mins  81193;     Ultrasound:     0     mins  29837;    Electrical Stimulation:    0     mins  91162 ( );  Iontophoresis    0     mins 54156;  Aquatic Therapy    0     mins 20310;  Dry Needling              0     mins 28454/ 93195 (Self-pay)    Untimed:  Electrical Stimulation:    0     mins  63833 (MC );  Traction:    0     mins  03332;     Timed Treatment:   40   mins   Total Treatment:     40   mins    Marleen Holt PT  Physical Therapist    [place] License:[id number]

## 2022-02-07 ENCOUNTER — OFFICE VISIT (OUTPATIENT)
Dept: INTERNAL MEDICINE | Facility: CLINIC | Age: 52
End: 2022-02-07

## 2022-02-07 VITALS
TEMPERATURE: 98.7 F | HEART RATE: 80 BPM | OXYGEN SATURATION: 97 % | HEIGHT: 77 IN | SYSTOLIC BLOOD PRESSURE: 120 MMHG | BODY MASS INDEX: 30.58 KG/M2 | WEIGHT: 259 LBS | DIASTOLIC BLOOD PRESSURE: 81 MMHG

## 2022-02-07 DIAGNOSIS — M54.41 CHRONIC BILATERAL LOW BACK PAIN WITH BILATERAL SCIATICA: Chronic | ICD-10-CM

## 2022-02-07 DIAGNOSIS — G89.29 CHRONIC BILATERAL LOW BACK PAIN WITH BILATERAL SCIATICA: Chronic | ICD-10-CM

## 2022-02-07 DIAGNOSIS — D86.0 SARCOIDOSIS OF LUNG: Chronic | ICD-10-CM

## 2022-02-07 DIAGNOSIS — R60.9 PERIPHERAL EDEMA: ICD-10-CM

## 2022-02-07 DIAGNOSIS — M54.42 CHRONIC BILATERAL LOW BACK PAIN WITH BILATERAL SCIATICA: Chronic | ICD-10-CM

## 2022-02-07 DIAGNOSIS — Z00.00 ROUTINE ADULT HEALTH MAINTENANCE: Primary | ICD-10-CM

## 2022-02-07 PROCEDURE — 99396 PREV VISIT EST AGE 40-64: CPT | Performed by: NURSE PRACTITIONER

## 2022-02-07 NOTE — ASSESSMENT & PLAN NOTE
Encouraged 150 minutes weekly exercise.   Recommended to follow healthy diet choices according to [employer] food guidance.   Labs today.   Refusing influenza/PSV23 today.   Covid 19 pending-- patient states he will get this done soon.   Continue with self testicular examinations monthly.   Anticipatory guidance given regarding health prevention/wellness, diet/exercise, tobacco/alcohol/drug education, exercise and wellbeing, covid 19 guidance, and sexual health/STD education.

## 2022-02-07 NOTE — ASSESSMENT & PLAN NOTE
Recommended compression hose to BLE during daytime hours.   CMP/BNP today.   Echo from 2020 reviewed-- MAC noted. Recommended further evaluation with cardiology-- patient does not want to follow up with cardiology at this time. We discussed that if he changes his mind, I would recommend it due to findings on echo.

## 2022-02-07 NOTE — PROGRESS NOTES
"Chief Complaint  Annual Exam    Subjective          Bonifacio Lewis presents to [facility]  History of Present Illness  This is a 50 y/o male presenting to office for annual exam. Patient reports he is currently single and has three children. 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     Patient reports using tobacco-- smokes 1 pack every 3 days. Patient reports tobacco use for 10-15 years. Patient reports occasional alcohol use. Patient denies any illicit drug use.     Patient reports currently participating in PT. Patient reports following healthy diet.     Patient reports continuing to experiencing back pain-- currently still waiting on pain management referral-- he says they are telling him they need a referral. Patient continues participating in PT. Patient reports using heating pad for pain. Patient does report some insurance issues with receiving zanaflex previously but reports this medication did not help with the pain.     Patient reports following with Dr. Paniagua regarding pulmonary sarcoidosis-- patient continues on cellcept for this. Patient was taking prednisone-- patient reports he has completed prednisone treatment. Patient reports he will be seeing Dr. Paniagua again in July.     Objective   Vital Signs:   /81   Pulse 80   Temp 98.7 °F (37.1 °C) (Temporal)   Ht 195.6 cm (77.01\")   Wt 117 kg (259 lb)   SpO2 97%   BMI 30.71 kg/m²     Physical Exam   Result Review :   The following data was reviewed by: STEVE Willams on 02/07/2022:  Common labs    Common Labsle 12/1/21 12/1/21 12/1/21 12/1/21 12/2/21 12/12/21 12/12/21    0508 0508 0508 0508  1818 1818   Glucose  147 (A)   90  107 (A)   BUN  13   13  17   Creatinine  1.31 (A)   1.34 (A)  1.45 (A)   eGFR  Am  70   68  62   Sodium  138   143  143   Potassium  3.5   4.5  4.1   Chloride  100   103  104   Calcium  8.8   10.0  10.3   Albumin       4.70   Total Bilirubin       0.4   Alkaline " Phosphatase       68   AST (SGOT)       20   ALT (SGPT)       25   WBC   11.32 (A)   12.57 (A)    Hemoglobin   13.3   15.4    Hematocrit   37.5   44.6    Platelets   122 (A)   219    Total Cholesterol 186         Triglycerides 235 (A)         HDL Cholesterol 77 (A)         LDL Cholesterol  71         Hemoglobin A1C    5.74 (A)      (A) Abnormal value       Comments are available for some flowsheets but are not being displayed.                  Assessment and Plan    Diagnoses and all orders for this visit:    1. Routine adult health maintenance (Primary)  Assessment & Plan:  Encouraged 150 minutes weekly exercise.   Recommended to follow healthy diet choices according to [employer] food guidance.   Labs today.   Refusing influenza/PSV23 today.   Covid 19 pending-- patient states he will get this done soon.   Continue with self testicular examinations monthly.   Anticipatory guidance given regarding health prevention/wellness, diet/exercise, tobacco/alcohol/drug education, exercise and wellbeing, covid 19 guidance, and sexual health/STD education.       Orders:  -     Cancel: CBC & Differential  -     Comprehensive metabolic panel  -     Cancel: Hemoglobin A1c  -     Cancel: Hepatitis C antibody  -     Cancel: BNP ([facility] Only)  -     Lipid panel  -     TSH Rfx On Abnormal To Free T4; Future  -     TSH Rfx On Abnormal To Free T4  -     Hepatitis C Antibody; Future  -     Hemoglobin A1c; Future  -     CBC & Differential; Future    2. Chronic bilateral low back pain with bilateral sciatica  Assessment & Plan:  Referral placed to pain management.   Patient continues following with Physical therapy.       3. Sarcoidosis of lung (HCC)  Assessment & Plan:  Following with Dr. Paniagua with pulmonary-- continues on cellcept.       4. Peripheral edema  Assessment & Plan:  Recommended compression hose to BLE during daytime hours.   CMP/BNP today.   Echo from 2020 reviewed-- MAC noted. Recommended further evaluation with cardiology--  patient does not want to follow up with cardiology at this time. We discussed that if he changes his mind, I would recommend it due to findings on echo.     Orders:  -     Cancel: BNP ([facility] Only)  -     proBNP      Follow Up {Instructions Charge Capture  Follow-up Communications :23}  Return in about 1 year (around 2/7/2023).  Patient was given instructions and counseling regarding his condition or for health maintenance advice. Please see specific information pulled into the AVS if appropriate.

## 2022-02-08 ENCOUNTER — TREATMENT (OUTPATIENT)
Dept: PHYSICAL THERAPY | Facility: CLINIC | Age: 52
End: 2022-02-08

## 2022-02-08 DIAGNOSIS — M54.50 CHRONIC BILATERAL LOW BACK PAIN WITHOUT SCIATICA: Primary | ICD-10-CM

## 2022-02-08 DIAGNOSIS — R20.0 NUMBNESS IN BOTH LEGS: ICD-10-CM

## 2022-02-08 DIAGNOSIS — G89.29 CHRONIC BILATERAL LOW BACK PAIN WITHOUT SCIATICA: Primary | ICD-10-CM

## 2022-02-08 DIAGNOSIS — Z98.1 HISTORY OF LUMBAR SPINAL FUSION: ICD-10-CM

## 2022-02-08 LAB
ALBUMIN SERPL-MCNC: 4.2 G/DL (ref 4–5)
ALBUMIN/GLOB SERPL: 1.5 {RATIO} (ref 1.2–2.2)
ALP SERPL-CCNC: 81 IU/L (ref 44–121)
ALT SERPL-CCNC: 28 IU/L (ref 0–44)
AST SERPL-CCNC: 36 IU/L (ref 0–40)
BASOPHILS # BLD AUTO: NORMAL 10*3/UL
BILIRUB SERPL-MCNC: <0.2 MG/DL (ref 0–1.2)
BNP SERPL-MCNC: NORMAL PG/ML
BUN SERPL-MCNC: 10 MG/DL (ref 6–24)
BUN/CREAT SERPL: 6 (ref 9–20)
CALCIUM SERPL-MCNC: 9.6 MG/DL (ref 8.7–10.2)
CHLORIDE SERPL-SCNC: 103 MMOL/L (ref 96–106)
CHOLEST SERPL-MCNC: 209 MG/DL (ref 100–199)
CO2 SERPL-SCNC: 20 MMOL/L (ref 20–29)
CREAT SERPL-MCNC: 1.56 MG/DL (ref 0.76–1.27)
EOSINOPHIL # BLD AUTO: NORMAL 10*3/UL
EOSINOPHIL NFR BLD AUTO: NORMAL %
GLOBULIN SER CALC-MCNC: 2.8 G/DL (ref 1.5–4.5)
GLUCOSE SERPL-MCNC: 103 MG/DL (ref 65–99)
HBA1C MFR BLD: NORMAL %
HCT VFR BLD AUTO: NORMAL %
HCV AB S/CO SERPL IA: <0.1 S/CO RATIO (ref 0–0.9)
HDLC SERPL-MCNC: 46 MG/DL
HGB BLD-MCNC: NORMAL G/DL
LDLC SERPL CALC-MCNC: 117 MG/DL (ref 0–99)
LYMPHOCYTES # BLD AUTO: NORMAL 10*3/UL
LYMPHOCYTES NFR BLD AUTO: NORMAL %
MONOCYTES NFR BLD AUTO: NORMAL %
NEUTROPHILS NFR BLD AUTO: NORMAL %
NT-PROBNP SERPL-MCNC: 22 PG/ML (ref 0–121)
PLATELET # BLD AUTO: NORMAL 10*3/UL
POTASSIUM SERPL-SCNC: 4.5 MMOL/L (ref 3.5–5.2)
PROT SERPL-MCNC: 7 G/DL (ref 6–8.5)
RBC # BLD AUTO: NORMAL 10*6/UL
REQUEST PROBLEM: NORMAL
SODIUM SERPL-SCNC: 140 MMOL/L (ref 134–144)
SPECIMEN STATUS: NORMAL
TRIGL SERPL-MCNC: 263 MG/DL (ref 0–149)
TSH SERPL DL<=0.005 MIU/L-ACNC: NORMAL UIU/ML
VLDLC SERPL CALC-MCNC: 46 MG/DL (ref 5–40)
WBC # BLD AUTO: NORMAL X10E3/UL

## 2022-02-08 PROCEDURE — 97110 THERAPEUTIC EXERCISES: CPT | Performed by: PHYSICAL THERAPIST

## 2022-02-08 NOTE — PROGRESS NOTES
Physical Therapy Daily Progress Note    Patient: Bonifacio Lewsi   : 1970  Diagnosis/ICD-10 Code:  Chronic bilateral low back pain without sciatica [M54.50, G89.29]  Referring practitioner: STEVE Willams  Date of Initial Visit: Type: THERAPY  Noted: 2022  Today's Date: 2022  Patient seen for 4 sessions           Subjective Sore in the right muscles     Objective   See Exercise, Manual, and Modality Logs for complete treatment.       Assessment/Plan    No change in symptoms however strengthening targeting core muscle effectively with appropriate muscle soreness.  Not tolerable of kneeling on numb knees.        Timed:    Manual Therapy:    0     mins  41821;  Therapeutic Exercise:    45     mins  15818;     Neuromuscular Vickie:    0    mins  04892;    Therapeutic Activity:     0     mins  71754;     Gait Trainin     mins  98104;     Ultrasound:     0     mins  52789;    Electrical Stimulation:    0     mins  24505 ( );  Iontophoresis    0     mins 10576;  Aquatic Therapy    0     mins 52118;  Dry Needling              0     mins 05478/  (Self-pay)    Untimed:  Electrical Stimulation:    0     mins  84089 ( );  Traction:    0     mins  29018;     Timed Treatment:   45   mins   Total Treatment:     45   mins    Marleen Holt PT  Physical Therapist    [place] License:[id number]

## 2022-02-09 DIAGNOSIS — E78.2 MIXED HYPERLIPIDEMIA: ICD-10-CM

## 2022-02-09 DIAGNOSIS — N18.31 STAGE 3A CHRONIC KIDNEY DISEASE: Primary | ICD-10-CM

## 2022-02-09 RX ORDER — ATORVASTATIN CALCIUM 20 MG/1
20 TABLET, FILM COATED ORAL DAILY
Qty: 30 TABLET | Refills: 2 | Status: SHIPPED | OUTPATIENT
Start: 2022-02-09 | End: 2022-05-10

## 2022-02-09 NOTE — PROGRESS NOTES
Please let patient know:    Kidney function has decreased since last checked-- I am going to refer to kidney specialist. Patient is showing lab work consistent with CKD stage 3.     Lipids abnormal-- LDL is at 117 and triglycerides are elevated-- I want patient to start statin therapy-- he should take this daily. Patient should also follow low saturated fat healthy heart diet and increase activity as tolerated. I recommend f/u with me in 3 months for fasting recheck.     Hep C screening is negative.     ProBNP is negative-- no evidence of CHF noted.     [facility]-- if patient needs sent somewhere else, please let me know. I have also referred to nephrology regarding kidney disease. Have patient f/u with me in 3 months for fasting visit for f/u with lipids.

## 2022-02-11 ENCOUNTER — TREATMENT (OUTPATIENT)
Dept: PHYSICAL THERAPY | Facility: CLINIC | Age: 52
End: 2022-02-11

## 2022-02-11 DIAGNOSIS — M54.50 CHRONIC BILATERAL LOW BACK PAIN WITHOUT SCIATICA: Primary | ICD-10-CM

## 2022-02-11 DIAGNOSIS — G89.29 CHRONIC BILATERAL LOW BACK PAIN WITHOUT SCIATICA: Primary | ICD-10-CM

## 2022-02-11 DIAGNOSIS — Z98.1 HISTORY OF LUMBAR SPINAL FUSION: ICD-10-CM

## 2022-02-11 DIAGNOSIS — R20.0 NUMBNESS IN BOTH LEGS: ICD-10-CM

## 2022-02-11 PROCEDURE — 97110 THERAPEUTIC EXERCISES: CPT | Performed by: PHYSICAL THERAPIST

## 2022-02-11 NOTE — PROGRESS NOTES
Physical Therapy Daily Progress Note    Patient: Bonifacio Lewis   : 1970  Diagnosis/ICD-10 Code:  Chronic bilateral low back pain without sciatica [M54.50, G89.29]  Referring practitioner: STEVE Willams  Date of Initial Visit: Type: THERAPY  Noted: 2022  Today's Date: 2022  Patient seen for 5 sessions           Subjective I could feel more thigh and calf muscles more after last session    Objective   See Exercise, Manual, and Modality Logs for complete treatment.   Difficulty with Tyrese chair back extension more today     Assessment/Plan    Progressing with lumbar rehab exercises.  Core strength is slowly progressing but challenging with the more isolated exercises. He does experience some tightness after some exercises more today and needs to stretch between.       Timed:    Manual Therapy:    0     mins  60763;  Therapeutic Exercise:    43     mins  71365;     Neuromuscular Vickie:    0    mins  96119;    Therapeutic Activity:     0     mins  22351;     Gait Trainin     mins  23871;     Ultrasound:     0     mins  59745;    Electrical Stimulation:    0     mins  76607 ( );  Iontophoresis    0     mins 29723;  Aquatic Therapy    0     mins 83921;  Dry Needling              0     mins 64659/ 71745 (Self-pay)    Untimed:  Electrical Stimulation:    0     mins  56487 ( );  Traction:    0     mins  20565;     Timed Treatment:   43   mins   Total Treatment:     43   mins    Marleen Holt PT  Physical Therapist    [place] License:[id number]

## 2022-04-18 NOTE — TELEPHONE ENCOUNTER
If I am to be responsible for his treatment he is going to need to get the test that I order.  If he can tolerate that and the pain is unbearable he may need to go to the emergency department although I doubt that they would admit him for this.   Yes

## 2022-07-11 ENCOUNTER — HOSPITAL ENCOUNTER (OUTPATIENT)
Dept: CT IMAGING | Facility: HOSPITAL | Age: 52
Discharge: HOME | End: 2022-07-11
Admitting: INTERNAL MEDICINE

## 2022-07-11 DIAGNOSIS — D86.9 SARCOIDOSIS: ICD-10-CM

## 2022-07-11 PROCEDURE — 71250 CT THORAX DX C-: CPT

## 2022-09-13 ENCOUNTER — TELEPHONE (OUTPATIENT)
Dept: INTERNAL MEDICINE | Facility: CLINIC | Age: 52
End: 2022-09-13

## 2022-09-13 NOTE — TELEPHONE ENCOUNTER
Hub staff attempted to follow warm transfer process and was unsuccessful     Caller: Bonifacio Lewis    Relationship to patient: Self    Best call back number: [phone]    Patient is needing: TO SET UP LAB APPOINTMENT TO HAVE THYROID RECHECK

## 2022-09-16 DIAGNOSIS — Z00.00 ROUTINE ADULT HEALTH MAINTENANCE: Primary | ICD-10-CM

## 2022-09-20 LAB
BASOPHILS # BLD AUTO: 0.02 10*3/MM3 (ref 0–0.2)
BASOPHILS NFR BLD AUTO: 0.2 % (ref 0–1.5)
EOSINOPHIL # BLD AUTO: 0.05 10*3/MM3 (ref 0–0.4)
EOSINOPHIL NFR BLD AUTO: 0.6 % (ref 0.3–6.2)
ERYTHROCYTE [DISTWIDTH] IN BLOOD BY AUTOMATED COUNT: 13.9 % (ref 12.3–15.4)
HBA1C MFR BLD: 5.7 % (ref 4.8–5.6)
HCT VFR BLD AUTO: 42.9 % (ref 37.5–51)
HGB BLD-MCNC: 14.9 G/DL (ref 13–17.7)
IMM GRANULOCYTES # BLD AUTO: 0.03 10*3/MM3 (ref 0–0.05)
IMM GRANULOCYTES NFR BLD AUTO: 0.4 % (ref 0–0.5)
LYMPHOCYTES # BLD AUTO: 1.8 10*3/MM3 (ref 0.7–3.1)
LYMPHOCYTES NFR BLD AUTO: 22 % (ref 19.6–45.3)
MCH RBC QN AUTO: 30.6 PG (ref 26.6–33)
MCHC RBC AUTO-ENTMCNC: 34.7 G/DL (ref 31.5–35.7)
MCV RBC AUTO: 88.1 FL (ref 79–97)
MONOCYTES # BLD AUTO: 0.79 10*3/MM3 (ref 0.1–0.9)
MONOCYTES NFR BLD AUTO: 9.7 % (ref 5–12)
NEUTROPHILS # BLD AUTO: 5.48 10*3/MM3 (ref 1.7–7)
NEUTROPHILS NFR BLD AUTO: 67.1 % (ref 42.7–76)
NRBC BLD AUTO-RTO: 0 /100 WBC (ref 0–0.2)
PLATELET # BLD AUTO: 183 10*3/MM3 (ref 140–450)
RBC # BLD AUTO: 4.87 10*6/MM3 (ref 4.14–5.8)
TSH SERPL DL<=0.005 MIU/L-ACNC: 1.31 UIU/ML (ref 0.27–4.2)
WBC # BLD AUTO: 8.17 10*3/MM3 (ref 3.4–10.8)

## 2022-09-20 NOTE — PROGRESS NOTES
Please let patient know-- CBC stable  A1C shows prediabetes-- continue with low glycemic diet choices and exercise.   TSH WNL.     F/u with me as scheduled

## 2023-04-17 ENCOUNTER — TRANSCRIBE ORDERS (OUTPATIENT)
Dept: ADMINISTRATIVE | Facility: HOSPITAL | Age: 53
End: 2023-04-17
Payer: MEDICAID

## 2023-04-17 DIAGNOSIS — D86.9 SARCOIDOSIS: ICD-10-CM

## 2023-04-17 DIAGNOSIS — J98.4 RESTRICTIVE LUNG DISEASE: Primary | ICD-10-CM

## 2023-12-11 ENCOUNTER — HOSPITAL ENCOUNTER (OUTPATIENT)
Dept: CT IMAGING | Facility: HOSPITAL | Age: 53
Discharge: HOME | End: 2023-12-11
Admitting: INTERNAL MEDICINE
Payer: MEDICAID

## 2023-12-11 DIAGNOSIS — D86.9 SARCOIDOSIS: ICD-10-CM

## 2023-12-11 DIAGNOSIS — J98.4 RESTRICTIVE LUNG DISEASE: ICD-10-CM

## 2023-12-11 PROCEDURE — 71250 CT THORAX DX C-: CPT

## (undated) DEVICE — SUT VIC 1 OS8 27IN J699H

## (undated) DEVICE — SINGLE USE ASPIRATION NEEDLE: Brand: SINGLE USE ASPIRATION NEEDLE

## (undated) DEVICE — DISPOSABLE BIPOLAR CABLE 12FT. (3.6M): Brand: KIRWAN

## (undated) DEVICE — SPNG GZ WOVN 4X4IN 12PLY 10/BX STRL

## (undated) DEVICE — ADHS SKIN DERMABOND TOP ADVANCED

## (undated) DEVICE — BONE MARROW ASPIRATION NEEDLES ASPIRATOR KIT 3-HOLE 6 INCHES NDLE

## (undated) DEVICE — GLV SURG BIOGEL LTX PF 7 1/2

## (undated) DEVICE — PENCL E/S ULTRAVAC TELESCP NOSE HOLSTR 10FT

## (undated) DEVICE — TRAP FLD MINIVAC MEGADYNE 100ML

## (undated) DEVICE — SENSR O2 OXIMAX FNGR A/ 18IN NONSTR

## (undated) DEVICE — SUT MNCRYL PLS ANTIB UD 4/0 PS2 18IN

## (undated) DEVICE — LARGE BORE STOPCOCK WITH EXTENSION SET, MALE LUER LOCK ADAPTER WITH RETRACTABLE COLLAR

## (undated) DEVICE — TRAP,MUCUS SPECIMEN, 80CC: Brand: MEDLINE

## (undated) DEVICE — CODMAN® SURGICAL PATTIES 1/2" X 3" (1.27CM X 7.62CM): Brand: CODMAN®

## (undated) DEVICE — MEDI-VAC YANKAUER SUCTION HANDLE W/BULBOUS TIP: Brand: CARDINAL HEALTH

## (undated) DEVICE — VITAL SIGNS™ JACKSON-REES CIRCUITS: Brand: VITAL SIGNS™

## (undated) DEVICE — MSK AIRWY LARYNG LMA PILOT SZ4

## (undated) DEVICE — SOL NACL 0.9PCT 1000ML

## (undated) DEVICE — PK NEURO SPINE 40

## (undated) DEVICE — ADAPT CLN BIOGUARD AIR/H2O DISP

## (undated) DEVICE — DRSNG GZ PETROLTM XEROFORM CURAD 1X8IN STRL

## (undated) DEVICE — TUBING, SUCTION, 1/4" X 10', STRAIGHT: Brand: MEDLINE

## (undated) DEVICE — Device: Brand: BALLOON

## (undated) DEVICE — 6.0MM PRECISION ROUND

## (undated) DEVICE — FRCP BX RADJAW4 PULM WO NDL STD1.8X2 100

## (undated) DEVICE — SINGLE USE BIOPSY VALVE MAJ-210: Brand: SINGLE USE BIOPSY VALVE (STERILE)

## (undated) DEVICE — HANDPIECE SET WITH COAXIAL HIGH FLOW TIP AND SUCTION TUBE: Brand: INTERPULSE

## (undated) DEVICE — APPL CHLORAPREP HI/LITE 26ML ORNG

## (undated) DEVICE — ANTIBACTERIAL UNDYED BRAIDED (POLYGLACTIN 910), SYNTHETIC ABSORBABLE SUTURE: Brand: COATED VICRYL

## (undated) DEVICE — PK ATS CUST W CARDIOTOMY RESEVOIR

## (undated) DEVICE — PAD,ABDOMINAL,8"X10",ST,LF: Brand: MEDLINE

## (undated) DEVICE — GLV SURG PREMIERPRO ORTHO LTX PF SZ8 BRN

## (undated) DEVICE — LN SMPL O2 NASL/ORL SMART/CAPNOLINE PLS A/

## (undated) DEVICE — TOTAL TRAY, 16FR 10ML SIL FOLEY, URN: Brand: MEDLINE

## (undated) DEVICE — BITEBLOCK OMNI BLOC

## (undated) DEVICE — GOWN,ECLIPSE,FABRIC-REINFORCED,X-LARGE: Brand: MEDLINE

## (undated) DEVICE — SINGLE USE SUCTION VALVE MAJ-209: Brand: SINGLE USE SUCTION VALVE (STERILE)

## (undated) DEVICE — GLV SURG BIOGEL LTX PF 7

## (undated) DEVICE — SPONGE,LAP,12"X12",XR,ST,5/PK,40PK/CS: Brand: MEDLINE

## (undated) DEVICE — ADAPT SWVL FIBROPTIC BRONCH